# Patient Record
Sex: FEMALE | Race: BLACK OR AFRICAN AMERICAN | Employment: FULL TIME | URBAN - METROPOLITAN AREA
[De-identification: names, ages, dates, MRNs, and addresses within clinical notes are randomized per-mention and may not be internally consistent; named-entity substitution may affect disease eponyms.]

---

## 2017-02-06 ENCOUNTER — OFFICE VISIT (OUTPATIENT)
Dept: FAMILY MEDICINE CLINIC | Age: 37
End: 2017-02-06

## 2017-02-06 VITALS
TEMPERATURE: 98.3 F | OXYGEN SATURATION: 98 % | DIASTOLIC BLOOD PRESSURE: 100 MMHG | HEIGHT: 65 IN | BODY MASS INDEX: 35.65 KG/M2 | HEART RATE: 67 BPM | SYSTOLIC BLOOD PRESSURE: 142 MMHG | RESPIRATION RATE: 16 BRPM | WEIGHT: 214 LBS

## 2017-02-06 DIAGNOSIS — F32.0 MILD SINGLE CURRENT EPISODE OF MAJOR DEPRESSIVE DISORDER (HCC): ICD-10-CM

## 2017-02-06 DIAGNOSIS — I10 ESSENTIAL HYPERTENSION: Primary | ICD-10-CM

## 2017-02-06 DIAGNOSIS — Z72.0 TOBACCO USE: ICD-10-CM

## 2017-02-06 NOTE — PATIENT INSTRUCTIONS
MYFITNESS PAL (vlad on phone)       DASH Diet: Care Instructions  Your Care Instructions  The DASH diet is an eating plan that can help lower your blood pressure. DASH stands for Dietary Approaches to Stop Hypertension. Hypertension is high blood pressure. The DASH diet focuses on eating foods that are high in calcium, potassium, and magnesium. These nutrients can lower blood pressure. The foods that are highest in these nutrients are fruits, vegetables, low-fat dairy products, nuts, seeds, and legumes. But taking calcium, potassium, and magnesium supplements instead of eating foods that are high in those nutrients does not have the same effect. The DASH diet also includes whole grains, fish, and poultry. The DASH diet is one of several lifestyle changes your doctor may recommend to lower your high blood pressure. Your doctor may also want you to decrease the amount of sodium in your diet. Lowering sodium while following the DASH diet can lower blood pressure even further than just the DASH diet alone. Follow-up care is a key part of your treatment and safety. Be sure to make and go to all appointments, and call your doctor if you are having problems. It's also a good idea to know your test results and keep a list of the medicines you take. How can you care for yourself at home? Following the DASH diet  · Eat 4 to 5 servings of fruit each day. A serving is 1 medium-sized piece of fruit, ½ cup chopped or canned fruit, 1/4 cup dried fruit, or 4 ounces (½ cup) of fruit juice. Choose fruit more often than fruit juice. · Eat 4 to 5 servings of vegetables each day. A serving is 1 cup of lettuce or raw leafy vegetables, ½ cup of chopped or cooked vegetables, or 4 ounces (½ cup) of vegetable juice. Choose vegetables more often than vegetable juice. · Get 2 to 3 servings of low-fat and fat-free dairy each day. A serving is 8 ounces of milk, 1 cup of yogurt, or 1 ½ ounces of cheese.   · Eat 6 to 8 servings of grains each day. A serving is 1 slice of bread, 1 ounce of dry cereal, or ½ cup of cooked rice, pasta, or cooked cereal. Try to choose whole-grain products as much as possible. · Limit lean meat, poultry, and fish to 2 servings each day. A serving is 3 ounces, about the size of a deck of cards. · Eat 4 to 5 servings of nuts, seeds, and legumes (cooked dried beans, lentils, and split peas) each week. A serving is 1/3 cup of nuts, 2 tablespoons of seeds, or ½ cup of cooked beans or peas. · Limit fats and oils to 2 to 3 servings each day. A serving is 1 teaspoon of vegetable oil or 2 tablespoons of salad dressing. · Limit sweets and added sugars to 5 servings or less a week. A serving is 1 tablespoon jelly or jam, ½ cup sorbet, or 1 cup of lemonade. · Eat less than 2,300 milligrams (mg) of sodium a day. If you limit your sodium to 1,500 mg a day, you can lower your blood pressure even more. Tips for success  · Start small. Do not try to make dramatic changes to your diet all at once. You might feel that you are missing out on your favorite foods and then be more likely to not follow the plan. Make small changes, and stick with them. Once those changes become habit, add a few more changes. · Try some of the following:  ¨ Make it a goal to eat a fruit or vegetable at every meal and at snacks. This will make it easy to get the recommended amount of fruits and vegetables each day. ¨ Try yogurt topped with fruit and nuts for a snack or healthy dessert. ¨ Add lettuce, tomato, cucumber, and onion to sandwiches. ¨ Combine a ready-made pizza crust with low-fat mozzarella cheese and lots of vegetable toppings. Try using tomatoes, squash, spinach, broccoli, carrots, cauliflower, and onions. ¨ Have a variety of cut-up vegetables with a low-fat dip as an appetizer instead of chips and dip. ¨ Sprinkle sunflower seeds or chopped almonds over salads. Or try adding chopped walnuts or almonds to cooked vegetables.   ¨ Try some vegetarian meals using beans and peas. Add garbanzo or kidney beans to salads. Make burritos and tacos with mashed pitts beans or black beans. Where can you learn more? Go to http://sunita-kali.info/. Enter G907 in the search box to learn more about \"DASH Diet: Care Instructions. \"  Current as of: March 23, 2016  Content Version: 11.1  © 9803-4025 [email or web address], [employer]. Care instructions adapted under license by [employer] (which disclaims liability or warranty for this information). If you have questions about a medical condition or this instruction, always ask your healthcare professional. [place] [address] any warranty or liability for your use of this information.

## 2017-02-06 NOTE — PROGRESS NOTES
Chief Complaint   Patient presents with    Hypertension    Depression    Results     1. Have you been to the ER, urgent care clinic since your last visit? Hospitalized since your last visit? No    2. Have you seen or consulted any other health care providers outside of the [address] [place] [address] since your last visit? Include any pap smears or colon screening.  No

## 2017-02-06 NOTE — MR AVS SNAPSHOT
Visit Information Date & Time Provider Department Dept. Phone Encounter #  
 2/6/2017  9:30 AM Kristin Rodriguez, [address] [id number] Follow-up Instructions Return in about 4 weeks (around 3/6/2017), or CPE w/ pap. BP check. Jayant Carney Upcoming Health Maintenance Date Due  
 PAP AKA CERVICAL CYTOLOGY 10/17/2016 DTaP/Tdap/Td series (2 - Td) 9/17/2023 Allergies as of 2/6/2017  Review Complete On: 2/6/2017 By: Kristin Rodriguez MD  
  
 Severity Noted Reaction Type Reactions Pcn [Penicillins]  08/01/2011    Other (comments) Current Immunizations  Reviewed on 7/15/2016 Name Date Influenza Vaccine (Quad) PF 10/7/2015 Pneumococcal Polysaccharide (PPSV-23) 7/15/2016 Tdap 9/17/2013 Not reviewed this visit You Were Diagnosed With   
  
 Codes Comments Essential hypertension    -  Primary ICD-10-CM: I10 
ICD-9-CM: 401.9 Mild single current episode of major depressive disorder (HCC)     ICD-10-CM: F32.0 ICD-9-CM: 296.21 Tobacco use     ICD-10-CM: Z72.0 ICD-9-CM: 305.1 Vitals BP Pulse Temp Resp Height(growth percentile) Weight(growth percentile) (!) 142/100 (BP 1 Location: Left arm, BP Patient Position: Sitting) 67 98.3 °F (36.8 °C) (Oral) 16 5' 5\" (1.651 m) 214 lb (97.1 kg) SpO2 BMI OB Status Smoking Status 98% 35.61 kg/m2 Having regular periods Current Every Day Smoker BMI and BSA Data Body Mass Index Body Surface Area  
 35.61 kg/m 2 2.11 m 2 Preferred Pharmacy Pharmacy Name Phone [facility] [address] [place], [place] [address] [place] [address] [phone] Your Updated Medication List  
  
   
This list is accurate as of: 2/6/17 10:06 AM.  Always use your most recent med list. amLODIPine 5 mg tablet Commonly known as:  Dmitri Eagles Take 1 Tab by mouth daily. Blood Pressure Monitor Kit Commonly known as:  Quick Response BP Monitor Check bp once daily  
  
 polyethylene glycol 17 gram packet Commonly known as:  Ronne Zari Take 1 Packet by mouth daily. sertraline 100 mg tablet Commonly known as:  ZOLOFT  
take 1 tablet by mouth once daily Follow-up Instructions Return in about 4 weeks (around 3/6/2017), or CPE w/ pap. BP check. Tim Mancilla Patient Instructions MYFITNESS PAL (vlad on phone) DASH Diet: Care Instructions Your Care Instructions The DASH diet is an eating plan that can help lower your blood pressure. DASH stands for Dietary Approaches to Stop Hypertension. Hypertension is high blood pressure. The DASH diet focuses on eating foods that are high in calcium, potassium, and magnesium. These nutrients can lower blood pressure. The foods that are highest in these nutrients are fruits, vegetables, low-fat dairy products, nuts, seeds, and legumes. But taking calcium, potassium, and magnesium supplements instead of eating foods that are high in those nutrients does not have the same effect. The DASH diet also includes whole grains, fish, and poultry. The DASH diet is one of several lifestyle changes your doctor may recommend to lower your high blood pressure. Your doctor may also want you to decrease the amount of sodium in your diet. Lowering sodium while following the DASH diet can lower blood pressure even further than just the DASH diet alone. Follow-up care is a key part of your treatment and safety. Be sure to make and go to all appointments, and call your doctor if you are having problems. It's also a good idea to know your test results and keep a list of the medicines you take. How can you care for yourself at home? Following the DASH diet · Eat 4 to 5 servings of fruit each day. A serving is 1 medium-sized piece of fruit, ½ cup chopped or canned fruit, 1/4 cup dried fruit, or 4 ounces (½ cup) of fruit juice. Choose fruit more often than fruit juice. · Eat 4 to 5 servings of vegetables each day. A serving is 1 cup of lettuce or raw leafy vegetables, ½ cup of chopped or cooked vegetables, or 4 ounces (½ cup) of vegetable juice. Choose vegetables more often than vegetable juice. · Get 2 to 3 servings of low-fat and fat-free dairy each day. A serving is 8 ounces of milk, 1 cup of yogurt, or 1 ½ ounces of cheese. · Eat 6 to 8 servings of grains each day. A serving is 1 slice of bread, 1 ounce of dry cereal, or ½ cup of cooked rice, pasta, or cooked cereal. Try to choose whole-grain products as much as possible. · Limit lean meat, poultry, and fish to 2 servings each day. A serving is 3 ounces, about the size of a deck of cards. · Eat 4 to 5 servings of nuts, seeds, and legumes (cooked dried beans, lentils, and split peas) each week. A serving is 1/3 cup of nuts, 2 tablespoons of seeds, or ½ cup of cooked beans or peas. · Limit fats and oils to 2 to 3 servings each day. A serving is 1 teaspoon of vegetable oil or 2 tablespoons of salad dressing. · Limit sweets and added sugars to 5 servings or less a week. A serving is 1 tablespoon jelly or jam, ½ cup sorbet, or 1 cup of lemonade. · Eat less than 2,300 milligrams (mg) of sodium a day. If you limit your sodium to 1,500 mg a day, you can lower your blood pressure even more. Tips for success · Start small. Do not try to make dramatic changes to your diet all at once. You might feel that you are missing out on your favorite foods and then be more likely to not follow the plan. Make small changes, and stick with them. Once those changes become habit, add a few more changes. · Try some of the following: ¨ Make it a goal to eat a fruit or vegetable at every meal and at snacks. This will make it easy to get the recommended amount of fruits and vegetables each day. ¨ Try yogurt topped with fruit and nuts for a snack or healthy dessert. ¨ Add lettuce, tomato, cucumber, and onion to sandwiches. ¨ Combine a ready-made pizza crust with low-fat mozzarella cheese and lots of vegetable toppings. Try using tomatoes, squash, spinach, broccoli, carrots, cauliflower, and onions. ¨ Have a variety of cut-up vegetables with a low-fat dip as an appetizer instead of chips and dip. ¨ Sprinkle sunflower seeds or chopped almonds over salads. Or try adding chopped walnuts or almonds to cooked vegetables. ¨ Try some vegetarian meals using beans and peas. Add garbanzo or kidney beans to salads. Make burritos and tacos with mashed pitts beans or black beans. Where can you learn more? Go to http://sunita-kali.info/. Enter J168 in the search box to learn more about \"DASH Diet: Care Instructions. \" Current as of: March 23, 2016 Content Version: 11.1 © 5868-7301 [employer]. Care instructions adapted under license by [employer] (which disclaims liability or warranty for this information). If you have questions about a medical condition or this instruction, always ask your healthcare professional. [place] [address] any warranty or liability for your use of this information. Introducing [facility]! Dear Jackie Braxton: Thank you for requesting a Seedcamp account. Our records indicate that you have previously registered for a Seedcamp account but its currently inactive. Please call our Seedcamp support line at [phone]. Additional Information If you have questions, please visit the Frequently Asked Questions section of the Seedcamp website at [email or web address] Remember, Seedcamp is NOT to be used for urgent needs. For medical emergencies, dial 911. Now available from your iPhone and Android! Please provide this summary of care documentation to your next provider. Your primary care clinician is listed as Kristin Rodriguez. If you have any questions after today's visit, please call [phone].

## 2017-02-06 NOTE — PROGRESS NOTES
Alla Villanueva, 39 y.o.,  female    SUBJECTIVE  Ff-up    HTN- says taking norvasc with good compliance. She does consume caffeine regularly, says low salt diet. Reviewed labs. She continues to smoke. Depression- says doing well on zoloft, work as  and home life are stable. ROS:  See HPI, all others negative        Patient Active Problem List   Diagnosis Code    Tobacco use Z72.0    Tension headache G44.209    Depression F32.9    Essential hypertension I10    Chronic constipation K59.09       Current Outpatient Prescriptions   Medication Sig Dispense Refill    sertraline (ZOLOFT) 100 mg tablet take 1 tablet by mouth once daily 30 Tab 0    amLODIPine (NORVASC) 5 mg tablet Take 1 Tab by mouth daily. 90 Tab 1    Blood Pressure Monitor (QUICK RESPONSE BP MONITOR) kit Check bp once daily 1 Kit 0    polyethylene glycol (MIRALAX) 17 gram packet Take 1 Packet by mouth daily. [address] Ledesma Constable Each 0       Allergies   Allergen Reactions    Pcn [Penicillins] Other (comments)       Past Medical History   Diagnosis Date    Chronic constipation      corazon, dr. Connie Acosta    Contact dermatitis and other eczema, due to unspecified cause     Depression 12/2/2015    Essential hypertension 12/30/2015    GERD (gastroesophageal reflux disease) 9/13/2013    Gynecologic exam normal      [place]    Headache(784.0)        Social History     Social History    Marital status: SINGLE     Spouse name: N/A    Number of children: N/A    Years of education: N/A     Occupational History    Not on file.      Social History Main Topics    Smoking status: Current Every Day Smoker    Smokeless tobacco: Never Used    Alcohol use Yes      Comment: 3/month    Drug use: No    Sexual activity: Yes     Partners: Male     Birth control/ protection: None     Other Topics Concern    Not on file     Social History Narrative       Family History   Problem Relation Age of Onset    Diabetes Mother     Hypertension Mother    [facility] Kidney Disease Mother      stones         OBJECTIVE    Physical Exam:     Visit Vitals    BP (!) 142/100 (BP 1 Location: Left arm, BP Patient Position: Sitting)    Pulse 67    Temp 98.3 °F (36.8 °C) (Oral)    Resp 16    Ht 5' 5\" (1.651 m)    Wt 214 lb (97.1 kg)    SpO2 98%    BMI 35.61 kg/m2       General: alert, well-appearing,  in no apparent distress or pain  CVS: normal rate, regular rhythm, distinct S1 and S2  Lungs:clear to ausculation bilaterally, no crackles, wheezing or rhonchi noted  Abdomen: normoactive bowel sounds, soft, non-tender  Extremities: no edema, no cyanosis,  Skin: warm, no lesions, rashes noted  Psych:  mood and affect normal    Results for orders placed or performed during the hospital encounter of 11/08/16   LIPID PANEL   Result Value Ref Range    LIPID PROFILE          Cholesterol, total 121 <200 MG/DL    Triglyceride 79 <150 MG/DL    HDL Cholesterol 41 40 - 60 MG/DL    LDL, calculated 64.2 0 - 100 MG/DL    VLDL, calculated 15.8 MG/DL    CHOL/HDL Ratio 3.0 0 - 5.0     METABOLIC PANEL, COMPREHENSIVE   Result Value Ref Range    Sodium 137 136 - 145 mmol/L    Potassium 4.4 3.5 - 5.5 mmol/L    Chloride 104 100 - 108 mmol/L    CO2 29 21 - 32 mmol/L    Anion gap 4 3.0 - 18 mmol/L    Glucose 74 74 - 99 mg/dL    BUN 8 7.0 - 18 MG/DL    Creatinine 0.50 (L) 0.6 - 1.3 MG/DL    BUN/Creatinine ratio 16 12 - 20      GFR est AA >60 >60 ml/min/1.73m2    GFR est non-AA >60 >60 ml/min/1.73m2    Calcium 8.9 8.5 - 10.1 MG/DL    Bilirubin, total 0.3 0.2 - 1.0 MG/DL    ALT (SGPT) 36 13 - 56 U/L    AST (SGOT) 17 15 - 37 U/L    Alk. phosphatase 105 45 - 117 U/L    Protein, total 7.3 6.4 - 8.2 g/dL    Albumin 3.6 3.4 - 5.0 g/dL    Globulin 3.7 2.0 - 4.0 g/dL    A-G Ratio 1.0 0.8 - 1.7           ASSESSMENT/PLAN  Jose Eduardo was seen today for hypertension, depression and results.     Diagnoses and all orders for this visit:    Essential hypertension  Elevated today, DASH diet, avoid caffeine  BP log, if persistently elevated, will increase norvasc dose on next visit    Mild single current episode of major depressive disorder ([facility] [address]  Stable, cont zoloft    Tobacco use  Discussed cessation    BMI 35-   Reiterated wt loss, discussed myfitnesspal vlad, calorie counting. Follow-up Disposition:  Return in about 4 weeks (around 3/6/2017), or CPE w/ pap. BP check. .      Patient understands plan of care. Patient has provided input and agrees with goals.

## 2017-02-19 RX ORDER — AMLODIPINE BESYLATE 5 MG/1
TABLET ORAL
Qty: 90 TAB | Refills: 1 | Status: SHIPPED | OUTPATIENT
Start: 2017-02-19 | End: 2017-08-29 | Stop reason: SDUPTHER

## 2017-02-23 ENCOUNTER — TELEPHONE (OUTPATIENT)
Dept: FAMILY MEDICINE CLINIC | Age: 37
End: 2017-02-23

## 2017-02-23 NOTE — TELEPHONE ENCOUNTER
Patient called stating she is not feeling well has headache and a little nauseated. Patient states her BP has been running 144/103 and 154/94 x 2 days. Patient is taking Norvasc 5 mg daily .  Please advise

## 2017-02-23 NOTE — TELEPHONE ENCOUNTER
Left message for patient to return my call  Placed patient in 0800 appt on 02/27/2017 if she can make appt time

## 2017-02-23 NOTE — TELEPHONE ENCOUNTER
Patient identified with 2 identifiers (name and ). Patient aware to take BP daily and be seen sooner. Patient is going to keep her appt in  and monitor BP daily. ER precautions have been discussed with patient.

## 2017-03-06 ENCOUNTER — OFFICE VISIT (OUTPATIENT)
Dept: FAMILY MEDICINE CLINIC | Age: 37
End: 2017-03-06

## 2017-03-06 ENCOUNTER — HOSPITAL ENCOUNTER (OUTPATIENT)
Dept: LAB | Age: 37
Discharge: HOME | End: 2017-03-06
Payer: COMMERCIAL

## 2017-03-06 VITALS
DIASTOLIC BLOOD PRESSURE: 84 MMHG | WEIGHT: 215 LBS | TEMPERATURE: 97.9 F | SYSTOLIC BLOOD PRESSURE: 120 MMHG | BODY MASS INDEX: 35.82 KG/M2 | HEART RATE: 63 BPM | OXYGEN SATURATION: 97 % | HEIGHT: 65 IN | RESPIRATION RATE: 16 BRPM

## 2017-03-06 DIAGNOSIS — Z01.419 WELL WOMAN EXAM WITH ROUTINE GYNECOLOGICAL EXAM: Primary | ICD-10-CM

## 2017-03-06 DIAGNOSIS — Z72.0 TOBACCO USE: ICD-10-CM

## 2017-03-06 DIAGNOSIS — R35.0 URINARY FREQUENCY: ICD-10-CM

## 2017-03-06 DIAGNOSIS — I10 ESSENTIAL HYPERTENSION: ICD-10-CM

## 2017-03-06 DIAGNOSIS — F32.0 MILD SINGLE CURRENT EPISODE OF MAJOR DEPRESSIVE DISORDER (HCC): ICD-10-CM

## 2017-03-06 LAB
BILIRUB UR QL STRIP: NEGATIVE
GLUCOSE UR-MCNC: NEGATIVE MG/DL
KETONES P FAST UR STRIP-MCNC: NEGATIVE MG/DL
PH UR STRIP: 6 [PH] (ref 4.6–8)
PROT UR QL STRIP: NEGATIVE MG/DL
SP GR UR STRIP: 1.02 (ref 1–1.03)
UA UROBILINOGEN AMB POC: NORMAL (ref 0.2–1)
URINALYSIS CLARITY POC: CLEAR
URINALYSIS COLOR POC: YELLOW
URINE BLOOD POC: NEGATIVE
URINE LEUKOCYTES POC: NEGATIVE
URINE NITRITES POC: NEGATIVE

## 2017-03-06 PROCEDURE — 88175 CYTOPATH C/V AUTO FLUID REDO: CPT | Performed by: FAMILY MEDICINE

## 2017-03-06 PROCEDURE — 87624 HPV HI-RISK TYP POOLED RSLT: CPT | Performed by: FAMILY MEDICINE

## 2017-03-06 PROCEDURE — 87625 HPV TYPES 16 & 18 ONLY: CPT | Performed by: FAMILY MEDICINE

## 2017-03-06 NOTE — MR AVS SNAPSHOT
Visit Information Date & Time Provider Department Dept. Phone Encounter #  
 3/6/2017  9:00 AM Angelo Velasco, [address] [id number] Follow-up Instructions Return in about 3 months (around 6/6/2017), or if symptoms worsen or fail to improve. Upcoming Health Maintenance Date Due  
 PAP AKA CERVICAL CYTOLOGY 10/17/2016 DTaP/Tdap/Td series (2 - Td) 9/17/2023 Allergies as of 3/6/2017  Review Complete On: 3/6/2017 By: Angelo Velasco MD  
  
 Severity Noted Reaction Type Reactions Pcn [Penicillins]  08/01/2011    Other (comments) Current Immunizations  Reviewed on 7/15/2016 Name Date Influenza Vaccine (Quad) PF 10/7/2015 Pneumococcal Polysaccharide (PPSV-23) 7/15/2016 Tdap 9/17/2013 Not reviewed this visit You Were Diagnosed With   
  
 Codes Comments Well woman exam with routine gynecological exam    -  Primary ICD-10-CM: I84.008 ICD-9-CM: V72.31 Urinary frequency     ICD-10-CM: R35.0 ICD-9-CM: 788.41 Essential hypertension     ICD-10-CM: I10 
ICD-9-CM: 401.9 Mild single current episode of major depressive disorder (HCC)     ICD-10-CM: F32.0 ICD-9-CM: 296.21 Tobacco use     ICD-10-CM: Z72.0 ICD-9-CM: 305.1 Vitals BP Pulse Temp Resp Height(growth percentile) Weight(growth percentile) 120/84 (BP 1 Location: Left arm, BP Patient Position: Sitting) 63 97.9 °F (36.6 °C) (Oral) 16 5' 5\" (1.651 m) 215 lb (97.5 kg) LMP SpO2 BMI OB Status Smoking Status 02/23/2017 (Exact Date) 97% 35.78 kg/m2 Having regular periods Current Every Day Smoker Vitals History BMI and BSA Data Body Mass Index Body Surface Area 35.78 kg/m 2 2.11 m 2 Preferred Pharmacy Pharmacy Name Phone [facility] [address] [place] [address] [phone] Your Updated Medication List  
  
   
 This list is accurate as of: 3/6/17  9:41 AM.  Always use your most recent med list. amLODIPine 5 mg tablet Commonly known as:  NORVASC  
take 1 tablet by mouth once daily Blood Pressure Monitor Kit Commonly known as:  Quick Response BP Monitor Check bp once daily  
  
 polyethylene glycol 17 gram packet Commonly known as:  Zari Romero Take 1 Packet by mouth daily. sertraline 100 mg tablet Commonly known as:  ZOLOFT  
take 1 tablet by mouth once daily We Performed the Following AMB POC URINALYSIS DIP STICK AUTO W/O MICRO [43135 CPT(R)] Follow-up Instructions Return in about 3 months (around 6/6/2017), or if symptoms worsen or fail to improve. Patient Instructions Stopping Smoking: Care Instructions Your Care Instructions Cigarette smokers crave the nicotine in cigarettes. Giving it up is much harder than simply changing a habit. Your body has to stop craving the nicotine. It is hard to quit, but you can do it. There are many tools that people use to quit smoking. You may find that combining tools works best for you. There are several steps to quitting. First you get ready to quit. Then you get support to help you. After that, you learn new skills and behaviors to become a nonsmoker. For many people, a necessary step is getting and using medicine. Your doctor will help you set up the plan that best meets your needs. You may want to attend a smoking cessation program to help you quit smoking. When you choose a program, look for one that has proven success. Ask your doctor for ideas. You will greatly increase your chances of success if you take medicine as well as get counseling or join a cessation program. 
Some of the changes you feel when you first quit tobacco are uncomfortable. Your body will miss the nicotine at first, and you may feel short-tempered and grumpy. You may have trouble sleeping or concentrating. Medicine can help you deal with these symptoms. You may struggle with changing your smoking habits and rituals. The last step is the tricky one: Be prepared for the smoking urge to continue for a time. This is a lot to deal with, but keep at it. You will feel better. Follow-up care is a key part of your treatment and safety. Be sure to make and go to all appointments, and call your doctor if you are having problems. Its also a good idea to know your test results and keep a list of the medicines you take. How can you care for yourself at home? · Ask your family, friends, and coworkers for support. You have a better chance of quitting if you have help and support. · Join a support group, such as Nicotine Anonymous, for people who are trying to quit smoking. · Consider signing up for a smoking cessation program, such as the American Lung Association's Freedom from Smoking program. 
· Set a quit date. Pick your date carefully so that it is not right in the middle of a big deadline or stressful time. Once you quit, do not even take a puff. Get rid of all ashtrays and lighters after your last cigarette. Clean your house and your clothes so that they do not smell of smoke. · Learn how to be a nonsmoker. Think about ways you can avoid those things that make you reach for a cigarette. ¨ Avoid situations that put you at greatest risk for smoking. For some people, it is hard to have a drink with friends without smoking. For others, they might skip a coffee break with coworkers who smoke. ¨ Change your daily routine. Take a different route to work or eat a meal in a different place. · Cut down on stress. Calm yourself or release tension by doing an activity you enjoy, such as reading a book, taking a hot bath, or gardening. · Talk to your doctor or pharmacist about nicotine replacement therapy, which replaces the nicotine in your body.  You still get nicotine but you do not use tobacco. Nicotine replacement products help you slowly reduce the amount of nicotine you need. These products come in several forms, many of them available over-the-counter: ¨ Nicotine patches ¨ Nicotine gum and lozenges ¨ Nicotine inhaler · Ask your doctor about bupropion (Wellbutrin) or varenicline (Chantix), which are prescription medicines. They do not contain nicotine. They help you by reducing withdrawal symptoms, such as stress and anxiety. · Some people find hypnosis, acupuncture, and massage helpful for ending the smoking habit. · Eat a healthy diet and get regular exercise. Having healthy habits will help your body move past its craving for nicotine. · Be prepared to keep trying. Most people are not successful the first few times they try to quit. Do not get mad at yourself if you smoke again. Make a list of things you learned and think about when you want to try again, such as next week, next month, or next year. Where can you learn more? Go to http://sunita-kali.info/. Enter B203 in the search box to learn more about \"Stopping Smoking: Care Instructions. \" Current as of: May 26, 2016 Content Version: 11.1 © 7068-9904 [employer]. Care instructions adapted under license by [employer] (which disclaims liability or warranty for this information). If you have questions about a medical condition or this instruction, always ask your healthcare professional. [address] any warranty or liability for your use of this information. Bladder Training: Care Instructions Your Care Instructions Bladder training is used to treat urge incontinence and stress incontinence. Urge incontinence means that the need to urinate comes on so fast that you can't get to a toilet in time. Stress incontinence means that you leak urine because of pressure on your bladder.  For example, it may happen when you laugh, cough, or lift something heavy. Bladder training can increase how long you can wait before you have to urinate. It can also help your bladder hold more urine. And it can give you better control over the urge to urinate. It is important to remember that bladder training takes a few weeks to a few months to make a difference. You may not see results right away, but don't give up. Follow-up care is a key part of your treatment and safety. Be sure to make and go to all appointments, and call your doctor if you are having problems. It's also a good idea to know your test results and keep a list of the medicines you take. How can you care for yourself at home? Work with your doctor to come up with a bladder training program that is right for you. You may use one or more of the following methods. Delayed urination · In the beginning, try to keep from urinating for 5 minutes after you first feel the need to go. · While you wait, take deep, slow breaths to relax. Kegel exercises can also help you delay the need to go to the bathroom. · After some practice, when you can easily wait 5 minutes to urinate, try to wait 10 minutes before you urinate. · Slowly increase the waiting period until you are able to control when you have to urinate. Scheduled urination · Empty your bladder when you first wake up in the morning. · Schedule times throughout the day when you will urinate. · Start by going to the bathroom every hour, even if you don't need to go. · Slowly increase the time between trips to the bathroom. · When you have found a schedule that works well for you, keep doing it. · If you wake up during the night and have to urinate, do it. Apply your schedule to waking hours only. Kegel exercises These tighten and strengthen pelvic muscles, which can help you control the flow of urine. To do Kegel exercises: 
· Squeeze the same muscles you would use to stop your urine.  Your belly and thighs should not move. · Hold the squeeze for 3 seconds, and then relax for 3 seconds. · Start with 3 seconds. Then add 1 second each week until you are able to squeeze for 10 seconds. · Repeat the exercise 10 to 15 times a session. Do three or more sessions a day. When should you call for help? Watch closely for changes in your health, and be sure to contact your doctor if: 
· Your incontinence is getting worse. · You do not get better as expected. Where can you learn more? Go to http://sunita-kali.info/. Enter J077 in the search box to learn more about \"Bladder Training: Care Instructions. \" Current as of: August 12, 2016 Content Version: 11.1 © 8910-8195 [employer]. Care instructions adapted under license by ffk environment (which disclaims liability or warranty for this information). If you have questions about a medical condition or this instruction, always ask your healthcare professional. [place] [address] any warranty or liability for your use of this information. Introducing [facility]! Dear Karyn Montgomery: Thank you for requesting a [employer] account. Our records indicate that you have previously registered for a [employer] account but its currently inactive. Please call our [employer] support line at [phone]. Additional Information If you have questions, please visit the Frequently Asked Questions section of the [employer] website at [email or web address] Remember, [employer] is NOT to be used for urgent needs. For medical emergencies, dial 911. Now available from your iPhone and Android! Please provide this summary of care documentation to your next provider. Your primary care clinician is listed as Eliza Chakraborty. If you have any questions after today's visit, please call [phone].

## 2017-03-06 NOTE — PATIENT INSTRUCTIONS
Stopping Smoking: Care Instructions  Your Care Instructions  Cigarette smokers crave the nicotine in cigarettes. Giving it up is much harder than simply changing a habit. Your body has to stop craving the nicotine. It is hard to quit, but you can do it. There are many tools that people use to quit smoking. You may find that combining tools works best for you. There are several steps to quitting. First you get ready to quit. Then you get support to help you. After that, you learn new skills and behaviors to become a nonsmoker. For many people, a necessary step is getting and using medicine. Your doctor will help you set up the plan that best meets your needs. You may want to attend a smoking cessation program to help you quit smoking. When you choose a program, look for one that has proven success. Ask your doctor for ideas. You will greatly increase your chances of success if you take medicine as well as get counseling or join a cessation program.  Some of the changes you feel when you first quit tobacco are uncomfortable. Your body will miss the nicotine at first, and you may feel short-tempered and grumpy. You may have trouble sleeping or concentrating. Medicine can help you deal with these symptoms. You may struggle with changing your smoking habits and rituals. The last step is the tricky one: Be prepared for the smoking urge to continue for a time. This is a lot to deal with, but keep at it. You will feel better. Follow-up care is a key part of your treatment and safety. Be sure to make and go to all appointments, and call your doctor if you are having problems. Its also a good idea to know your test results and keep a list of the medicines you take. How can you care for yourself at home? · Ask your family, friends, and coworkers for support. You have a better chance of quitting if you have help and support.   · Join a support group, such as Nicotine Anonymous, for people who are trying to quit smoking. · Consider signing up for a smoking cessation program, such as the American Lung Association's Freedom from Smoking program.  · Set a quit date. Pick your date carefully so that it is not right in the middle of a big deadline or stressful time. Once you quit, do not even take a puff. Get rid of all ashtrays and lighters after your last cigarette. Clean your house and your clothes so that they do not smell of smoke. · Learn how to be a nonsmoker. Think about ways you can avoid those things that make you reach for a cigarette. ¨ Avoid situations that put you at greatest risk for smoking. For some people, it is hard to have a drink with friends without smoking. For others, they might skip a coffee break with coworkers who smoke. ¨ Change your daily routine. Take a different route to work or eat a meal in a different place. · Cut down on stress. Calm yourself or release tension by doing an activity you enjoy, such as reading a book, taking a hot bath, or gardening. · Talk to your doctor or pharmacist about nicotine replacement therapy, which replaces the nicotine in your body. You still get nicotine but you do not use tobacco. Nicotine replacement products help you slowly reduce the amount of nicotine you need. These products come in several forms, many of them available over-the-counter:  ¨ Nicotine patches  ¨ Nicotine gum and lozenges  ¨ Nicotine inhaler  · Ask your doctor about bupropion (Wellbutrin) or varenicline (Chantix), which are prescription medicines. They do not contain nicotine. They help you by reducing withdrawal symptoms, such as stress and anxiety. · Some people find hypnosis, acupuncture, and massage helpful for ending the smoking habit. · Eat a healthy diet and get regular exercise. Having healthy habits will help your body move past its craving for nicotine. · Be prepared to keep trying. Most people are not successful the first few times they try to quit.  Do not get mad at yourself if you smoke again. Make a list of things you learned and think about when you want to try again, such as next week, next month, or next year. Where can you learn more? Go to http://sunita-kali.info/. Enter A226 in the search box to learn more about \"Stopping Smoking: Care Instructions. \"  Current as of: May 26, 2016  Content Version: 11.1  © 4808-2038 [employer]. Care instructions adapted under license by [employer] (which disclaims liability or warranty for this information). If you have questions about a medical condition or this instruction, always ask your healthcare professional. [place] [address] any warranty or liability for your use of this information. Bladder Training: Care Instructions  Your Care Instructions  Bladder training is used to treat urge incontinence and stress incontinence. Urge incontinence means that the need to urinate comes on so fast that you can't get to a toilet in time. Stress incontinence means that you leak urine because of pressure on your bladder. For example, it may happen when you laugh, cough, or lift something heavy. Bladder training can increase how long you can wait before you have to urinate. It can also help your bladder hold more urine. And it can give you better control over the urge to urinate. It is important to remember that bladder training takes a few weeks to a few months to make a difference. You may not see results right away, but don't give up. Follow-up care is a key part of your treatment and safety. Be sure to make and go to all appointments, and call your doctor if you are having problems. It's also a good idea to know your test results and keep a list of the medicines you take. How can you care for yourself at home? Work with your doctor to come up with a bladder training program that is right for you. You may use one or more of the following methods.   Delayed urination  · In the beginning, try to keep from urinating for 5 minutes after you first feel the need to go. · While you wait, take deep, slow breaths to relax. Kegel exercises can also help you delay the need to go to the bathroom. · After some practice, when you can easily wait 5 minutes to urinate, try to wait 10 minutes before you urinate. · Slowly increase the waiting period until you are able to control when you have to urinate. Scheduled urination  · Empty your bladder when you first wake up in the morning. · Schedule times throughout the day when you will urinate. · Start by going to the bathroom every hour, even if you don't need to go. · Slowly increase the time between trips to the bathroom. · When you have found a schedule that works well for you, keep doing it. · If you wake up during the night and have to urinate, do it. Apply your schedule to waking hours only. Kegel exercises  These tighten and strengthen pelvic muscles, which can help you control the flow of urine. To do Kegel exercises:  · Squeeze the same muscles you would use to stop your urine. Your belly and thighs should not move. · Hold the squeeze for 3 seconds, and then relax for 3 seconds. · Start with 3 seconds. Then add 1 second each week until you are able to squeeze for 10 seconds. · Repeat the exercise 10 to 15 times a session. Do three or more sessions a day. When should you call for help? Watch closely for changes in your health, and be sure to contact your doctor if:  · Your incontinence is getting worse. · You do not get better as expected. Where can you learn more? Go to http://sunita-kali.info/. Enter Q241 in the search box to learn more about \"Bladder Training: Care Instructions. \"  Current as of: August 12, 2016  Content Version: 11.1  © 0602-9623 [employer]InTheBox.[email or web address]. Care instructions adapted under license by statusboom (which disclaims liability or warranty for this information).  If you have questions about a medical condition or this instruction, always ask your healthcare professional. [place] [address] any warranty or liability for your use of this information.

## 2017-03-06 NOTE — PROGRESS NOTES
Chief Complaint   Patient presents with    Well Woman     last pap 10/17/13     Patient presents for annual pap smear. Last pap 10/17/13. Abnormal Pap smears -No.  Procedures if indicated -Tubal.Form of contraception Tubal ligation. Mammogram (if indicated) -Not to date. Family history of breast CA -No, colon CA -Yes MGM, cervical CA -No.Tetanus 9/17/13.

## 2017-03-06 NOTE — PROGRESS NOTES
Subjective:   39 y.o. female for Well Woman Check. Patient's last menstrual period was 02/23/2017 (exact date). s/p BTL    HTN-taking medication without problems    Depression- says doing well,  and enjoys her job, says stressful home life with 3 kids and single mom but finds this manageable. She is on zoloft. She continues to smoke    Some urinary frequency/urgency on and off past few years. Says increased in coffee intake as well. Past Medical History:   Diagnosis Date    Chronic constipation     corazon, dr. Julio Gomez    Contact dermatitis and other eczema, due to unspecified cause     Depression 12/2/2015    Essential hypertension 12/30/2015    GERD (gastroesophageal reflux disease) 9/13/2013    Gynecologic exam normal     [place]    Headache(784.0)      Past Surgical History:   Procedure Laterality Date    HX APPENDECTOMY      HX TUBAL LIGATION       Family History   Problem Relation Age of Onset    Diabetes Mother     Hypertension Mother     Kidney Disease Mother      stones     Social History   Substance Use Topics    Smoking status: Current Every Day Smoker    Smokeless tobacco: Never Used    Alcohol use Yes      Comment: 3/month        ROS:  Feeling well. No dyspnea or chest pain on exertion. No abdominal pain, change in bowel habits, black or bloody stools. No urinary tract symptoms. GYN ROS: no breast pain or new or enlarging lumps on self exam. No neurological complaints. Objective:     Visit Vitals    /84 (BP 1 Location: Left arm, BP Patient Position: Sitting)    Pulse 63    Temp 97.9 °F (36.6 °C) (Oral)    Resp 16    Ht 5' 5\" (1.651 m)    Wt 215 lb (97.5 kg)    LMP 02/23/2017 (Exact Date)    SpO2 97%    BMI 35.78 kg/m2     The patient appears well, alert, oriented x 3, in no distress. ENT normal.  Neck supple. No adenopathy or thyromegaly. ABRAHAN. Lungs are clear, good air entry, no wheezes, rhonchi or rales.  S1 and S2 normal, no murmurs, regular rate and rhythm. Abdomen soft without tenderness, guarding, mass or organomegaly. Extremities show no edema, normal peripheral pulses. Neurological is normal, no focal findings. BREAST EXAM: breasts appear normal, no suspicious masses, no skin or nipple changes or axillary nodes    PELVIC EXAM: normal external genitalia, vulva, vagina, cervix, uterus and adnexa    Assessment/Plan:   Jason Conklin was seen today for well woman and urinary frequency. Diagnoses and all orders for this visit:    Well woman exam with routine gynecological exam  -     PAP IG, HPV AND RFX HPV 33/13,73([id number]); Future  well woman  pap smear-done today  return annually or prn  reviewed diet, exercise and weight control. Urinary frequency  -     AMB POC URINALYSIS DIP STICK AUTO W/O MICRO    Essential hypertension  Controlled, cont current medication  Rpt in 3 months-     METABOLIC PANEL, BASIC; Future    Mild single current episode of major depressive disorder (HCC)  Stable, cont zoloft    Tobacco use  Counseled on quitting    Ff-up in 3 months or sooner prn. Patient/guardian understands plan of care. Patient has provided input and agrees with goals. Future labs to be discussed on next visit.

## 2017-03-10 NOTE — PROGRESS NOTES
Pap smear showed normal cells, but positive for HPV virus. Recommend repeat pap testing next year  Also + BV, which is usually a shift in vaginal environment, will treat with topical Metrogel qhs x 5 nights. Will send erx after pt is notified of result.

## 2017-03-20 RX ORDER — METRONIDAZOLE 7.5 MG/G
1 GEL VAGINAL
Qty: 187.5 MG | Refills: 0 | Status: SHIPPED | OUTPATIENT
Start: 2017-03-20 | End: 2017-03-25

## 2017-03-20 NOTE — PROGRESS NOTES
Patient identified with 2 identifiers (name and ). Patient aware of normal pap + HPV and repeat pap in 1 year. Patient aware of + BV and to be treated with metrogel qhs x 5 nights.  Please send medication to [facility] pharmacy on file

## 2017-05-01 RX ORDER — SERTRALINE HYDROCHLORIDE 100 MG/1
TABLET, FILM COATED ORAL
Qty: 30 TAB | Refills: 2 | Status: SHIPPED | OUTPATIENT
Start: 2017-05-01 | End: 2017-08-29 | Stop reason: SDUPTHER

## 2017-08-25 RX ORDER — SERTRALINE HYDROCHLORIDE 100 MG/1
TABLET, FILM COATED ORAL
Qty: 30 TAB | Refills: 2 | Status: CANCELLED | OUTPATIENT
Start: 2017-08-25

## 2017-08-25 RX ORDER — AMLODIPINE BESYLATE 5 MG/1
TABLET ORAL
Qty: 90 TAB | Refills: 1 | Status: CANCELLED | OUTPATIENT
Start: 2017-08-25

## 2017-08-25 NOTE — TELEPHONE ENCOUNTER
This patient contacted office for the following prescriptions to be filled:    Medication requested :   Requested Prescriptions     Pending Prescriptions Disp Refills    sertraline (ZOLOFT) 100 mg tablet 30 Tab 2     Sig: take 1 tablet by mouth once daily    amLODIPine (NORVASC) 5 mg tablet 90 Tab 1      PCP: [address] [place] [address] or Print: [facility]   Mail order or Local pharmacy  [address] [place] [address]    Scheduled appointment if not seen by current providers in office: LOV 3/6/2017f/u 8/29/2017

## 2017-08-29 ENCOUNTER — OFFICE VISIT (OUTPATIENT)
Dept: FAMILY MEDICINE CLINIC | Age: 37
End: 2017-08-29

## 2017-08-29 VITALS
SYSTOLIC BLOOD PRESSURE: 110 MMHG | HEIGHT: 65 IN | HEART RATE: 67 BPM | DIASTOLIC BLOOD PRESSURE: 70 MMHG | RESPIRATION RATE: 16 BRPM | BODY MASS INDEX: 36.75 KG/M2 | TEMPERATURE: 98.1 F | WEIGHT: 220.6 LBS | OXYGEN SATURATION: 97 %

## 2017-08-29 DIAGNOSIS — I10 ESSENTIAL HYPERTENSION: Primary | ICD-10-CM

## 2017-08-29 DIAGNOSIS — G44.209 TENSION-TYPE HEADACHE, NOT INTRACTABLE, UNSPECIFIED CHRONICITY PATTERN: ICD-10-CM

## 2017-08-29 DIAGNOSIS — F17.200 SMOKING: ICD-10-CM

## 2017-08-29 DIAGNOSIS — F32.89 OTHER DEPRESSION: ICD-10-CM

## 2017-08-29 DIAGNOSIS — K59.09 OTHER CONSTIPATION: ICD-10-CM

## 2017-08-29 RX ORDER — AMLODIPINE BESYLATE 5 MG/1
5 TABLET ORAL DAILY
Qty: 90 TAB | Refills: 0 | Status: SHIPPED | OUTPATIENT
Start: 2017-08-29 | End: 2017-11-30 | Stop reason: SDUPTHER

## 2017-08-29 RX ORDER — SERTRALINE HYDROCHLORIDE 100 MG/1
TABLET, FILM COATED ORAL
Qty: 30 TAB | Refills: 2 | Status: SHIPPED | OUTPATIENT
Start: 2017-08-29 | End: 2017-11-30 | Stop reason: SDUPTHER

## 2017-08-29 RX ORDER — POLYETHYLENE GLYCOL 3350 17 G/17G
17 POWDER, FOR SOLUTION ORAL DAILY
Qty: 30 EACH | Refills: 0 | Status: SHIPPED | OUTPATIENT
Start: 2017-08-29 | End: 2017-11-30 | Stop reason: SDUPTHER

## 2017-08-29 RX ORDER — HYDROCODONE BITARTRATE AND ACETAMINOPHEN 5; 325 MG/1; MG/1
TABLET ORAL
COMMUNITY
Start: 2016-03-08 | End: 2017-11-30

## 2017-08-29 NOTE — PROGRESS NOTES
HISTORY OF PRESENT ILLNESS  Jose Eduardo Goss is a 44 y.o. female. HPI: Dr. Rodolfo Uribe patient. Here for routine follow up and medication refill. Has h/o hypertension. On medication. Stable vitals today. Asymptomatic. Shows compliance with taking medication and no side effects. Also h/o depression. Stable on medication. Shows compliance with medication. No side effects. Does not follow any specialist. No specific concern. No mood changes. Sleep and appetite fair. No suicidal ideation. Does have on and off constipation. Taking high fiber diet and miralax as needed. Said much better than before. No abdominal pain. No nausea or vomiting. Said occasionally headache. One episode few days back. No concern at this time. Discussed to take tylenol or advil with food as soon as feels headache is coming. No ext weakness. No vision changes. No nausea or vomiting .said she has ate some barbeque and that brought her headache and felt better in one or two days. Does smoke. Trying to cut back but still smoking. Visit Vitals    /70 (BP 1 Location: Left arm, BP Patient Position: Sitting)    Pulse 67    Temp 98.1 °F (36.7 °C) (Oral)    Resp 16    Ht 5' 5\" (1.651 m)    Wt 220 lb 9.6 oz (100.1 kg)    SpO2 97%    BMI 36.71 kg/m2     Review medication list, vitals, problem list,allergies. ROS: see HPI     Physical Exam   Constitutional: She is oriented to person, place, and time. No distress. Neck: No thyromegaly present. Cardiovascular: Normal rate, regular rhythm and normal heart sounds. Pulmonary/Chest:   CTA   Abdominal: Soft. Bowel sounds are normal. There is no tenderness. Musculoskeletal: She exhibits no edema. Neurological: She is oriented to person, place, and time. Psychiatric: Her behavior is normal.       ASSESSMENT and PLAN    ICD-10-CM ICD-9-CM    1. Essential hypertension: stable at this time. Low salt diet. Exercise as tolerated. Will continue current plan.     I10 401.9 amLODIPine (NORVASC) 5 mg tablet   2. Other depression: stable. Given medication refill. F32.89 311 sertraline (ZOLOFT) 100 mg tablet   3. Other constipation: symptomatic treatment. High fiber diet. Drink more fluid. F/u next visit. K59.09 564.09 polyethylene glycol (MIRALAX) 17 gram packet   4. Smoking: discussed again smoking cessation. She is aware and said she will work on it slowly. F/u with PCP  F17.200 305.1    5. Tension-type headache, not intractable, unspecified chronicity pattern: for now on and off. Discuss to take tylenol or motrin / Gokul Mathew as soon as feeling it is coming. Eat on time. Drink more fluid. G44.209 339.10    Pt understood and agrees with above plan. Follow-up Disposition:  Return in about 3 months (around 11/29/2017).

## 2017-08-29 NOTE — PROGRESS NOTES
1. Have you been to the ER, urgent care clinic since your last visit? Hospitalized since your last visit? [facility] 4/05/17    2. Have you seen or consulted any other health care providers outside of the [address] [place] [address] since your last visit? Include any pap smears or colon screening. No    Last flu vaccine 8/24/17 through employer.

## 2017-08-29 NOTE — MR AVS SNAPSHOT
Visit Information Date & Time Provider Department Dept. Phone Encounter #  
 8/29/2017  3:00 PM Frank Garcia, [address] [id number] Follow-up Instructions Return in about 3 months (around 11/29/2017). Upcoming Health Maintenance Date Due  
 PAP AKA CERVICAL CYTOLOGY 3/6/2020 DTaP/Tdap/Td series (2 - Td) 9/17/2023 Allergies as of 8/29/2017  Review Complete On: 8/29/2017 By: Frank Garcia MD  
  
 Severity Noted Reaction Type Reactions Carrot  08/29/2017    Swelling Mouth and gum swelling Pcn [Penicillins]  08/01/2011    Other (comments) [place]  08/29/2017    Hives, Swelling Current Immunizations  Reviewed on 7/15/2016 Name Date Influenza Vaccine 8/24/2017 Influenza Vaccine (Quad) PF 10/7/2015 Pneumococcal Polysaccharide (PPSV-23) 7/15/2016 Tdap 9/17/2013 Not reviewed this visit You Were Diagnosed With   
  
 Codes Comments Essential hypertension    -  Primary ICD-10-CM: I10 
ICD-9-CM: 401.9 Other depression     ICD-10-CM: F32.89 ICD-9-CM: 245 Other constipation     ICD-10-CM: K59.09 
ICD-9-CM: 564.09 Smoking     ICD-10-CM: F17.200 ICD-9-CM: 305.1 Tension-type headache, not intractable, unspecified chronicity pattern     ICD-10-CM: G44.209 ICD-9-CM: 339.10 Vitals BP Pulse Temp Resp Height(growth percentile) Weight(growth percentile) 110/70 (BP 1 Location: Left arm, BP Patient Position: Sitting) 67 98.1 °F (36.7 °C) (Oral) 16 5' 5\" (1.651 m) 220 lb 9.6 oz (100.1 kg) LMP SpO2 BMI OB Status Smoking Status 08/19/2017 97% 36.71 kg/m2 Having regular periods Current Every Day Smoker Vitals History BMI and BSA Data Body Mass Index Body Surface Area  
 36.71 kg/m 2 2.14 m 2 Preferred Pharmacy Pharmacy Name Phone [id number], [address] [facility] [place] [phone] Your Updated Medication List  
  
   
 This list is accurate as of: 8/29/17  3:56 PM.  Always use your most recent med list. amLODIPine 5 mg tablet Commonly known as:  Prashant Grady Take 1 Tab by mouth daily. Blood Pressure Monitor Kit Commonly known as:  Quick Response BP Monitor Check bp once daily HYDROcodone-acetaminophen 5-325 mg per tablet Commonly known as:  Fredrica Carney Take 1 Tab by Mouth Every 4 Hours As Needed for Pain.  
  
 polyethylene glycol 17 gram packet Commonly known as:  Giovanny Holster Take 1 Packet by mouth daily. sertraline 100 mg tablet Commonly known as:  ZOLOFT  
take 1 tablet by mouth once daily Prescriptions Sent to Pharmacy Refills  
 polyethylene glycol (MIRALAX) 17 gram packet 0 Sig: Take 1 Packet by mouth daily. Class: Normal  
 Pharmacy: [facility]/pharmacy [address] [place] [address],[address] [facility] [address] [place] [address] Ph #: [phone] Route: Oral  
 sertraline (ZOLOFT) 100 mg tablet 2 Sig: take 1 tablet by mouth once daily Class: Normal  
 Pharmacy: [facility]/pharmacy #[id number] [facility] [address] Ph #: [phone]  
 amLODIPine (NORVASC) 5 mg tablet 0 Sig: Take 1 Tab by mouth daily. Class: Normal  
 Pharmacy: [facility]/pharmacy [address] [place] [address],[address] [facility] [place] [address] Ph #: [phone] Route: Oral  
  
Follow-up Instructions Return in about 3 months (around 11/29/2017). Patient Instructions Depression and Chronic Disease: Care Instructions Your Care Instructions A chronic disease is one that you have for a long time. Some chronic diseases can be controlled, but they usually cannot be cured. Depression is common in people with chronic diseases, but it often goes unnoticed. Many people have concerns about seeking treatment for a mental health problem. You may think it's a sign of weakness, or you don't want people to know about it.  It's important to overcome these reasons for not seeking treatment. Treating depression or anxiety is good for your health. Follow-up care is a key part of your treatment and safety. Be sure to make and go to all appointments, and call your doctor if you are having problems. It's also a good idea to know your test results and keep a list of the medicines you take. How can you care for yourself at home? Watch for symptoms of depression The symptoms of depression are often subtle at first. You may think they are caused by your disease rather than depression. Or you may think it is normal to be depressed when you have a chronic disease. If you are depressed you may: · Feel sad or hopeless. · Feel guilty or worthless. · Not enjoy the things you used to enjoy. · Feel hopeless, as though life is not worth living. · Have trouble thinking or remembering. · Have low energy, and you may not eat or sleep well. · Pull away from others. · Think often about death or killing yourself. (Keep the numbers for these national suicide hotlines: 3-907-489-TALK [1-971.949.9914] and 1-141-JIHMLMQ [1-982.475.3701]. ) Get treatment By treating your depression, you can feel more hopeful and have more energy. If you feel better, you may take better care of yourself, so your health may improve. · Talk to your doctor if you have any changes in mood during treatment for your disease. · Ask your doctor for help. Counseling, antidepressant medicine, or a combination of the two can help most people with depression. Often a combination works best. Counseling can also help you cope with having a chronic disease. When should you call for help? Call 911 anytime you think you may need emergency care. For example, call if: 
· You feel like hurting yourself or someone else. · Someone you know has depression and is about to attempt or is attempting suicide. Call your doctor now or seek immediate medical care if: 
· You hear voices. · Someone you know has depression and: ¨ Starts to give away his or her possessions. ¨ Uses illegal drugs or drinks alcohol heavily. ¨ Talks or writes about death, including writing suicide notes or talking about guns, knives, or pills. ¨ Starts to spend a lot of time alone. ¨ Acts very aggressively or suddenly appears calm. Watch closely for changes in your health, and be sure to contact your doctor if: 
· You do not get better as expected. Where can you learn more? Go to http://sunita-kali.info/. Enter A619 in the search box to learn more about \"Depression and Chronic Disease: Care Instructions. \" Current as of: July 26, 2016 Content Version: 11.3 © 8623-2855 [employer]. Care instructions adapted under license by [employer] (which disclaims liability or warranty for this information). If you have questions about a medical condition or this instruction, always ask your healthcare professional. [place] [address] any warranty or liability for your use of this information. Constipation: Care Instructions Your Care Instructions Constipation means that you have a hard time passing stools (bowel movements). People pass stools from 3 times a day to once every 3 days. What is normal for you may be different. Constipation may occur with pain in the rectum and cramping. The pain may get worse when you try to pass stools. Sometimes there are small amounts of bright red blood on toilet paper or the surface of stools. This is because of enlarged veins near the rectum (hemorrhoids). A few changes in your diet and lifestyle may help you avoid ongoing constipation. Your doctor may also prescribe medicine to help loosen your stool. Some medicines can cause constipation. These include pain medicines and antidepressants. Tell your doctor about all the medicines you take. Your doctor may want to make a medicine change to ease your symptoms. Follow-up care is a key part of your treatment and safety. Be sure to make and go to all appointments, and call your doctor if you are having problems. It's also a good idea to know your test results and keep a list of the medicines you take. How can you care for yourself at home? · Drink plenty of fluids, enough so that your urine is light yellow or clear like water. If you have kidney, heart, or liver disease and have to limit fluids, talk with your doctor before you increase the amount of fluids you drink. · Include high-fiber foods in your diet each day. These include fruits, vegetables, beans, and whole grains. · Get at least 30 minutes of exercise on most days of the week. Walking is a good choice. You also may want to do other activities, such as running, swimming, cycling, or playing tennis or team sports. · Take a fiber supplement, such as Citrucel or Metamucil, every day. Read and follow all instructions on the label. · Schedule time each day for a bowel movement. A daily routine may help. Take your time having your bowel movement. · Support your feet with a small step stool when you sit on the toilet. This helps flex your hips and places your pelvis in a squatting position. · Your doctor may recommend an over-the-counter laxative to relieve your constipation. Examples are Milk of Magnesia and MiraLax. Read and follow all instructions on the label. Do not use laxatives on a long-term basis. When should you call for help? Call your doctor now or seek immediate medical care if: 
· You have new or worse belly pain. · You have new or worse nausea or vomiting. · You have blood in your stools. Watch closely for changes in your health, and be sure to contact your doctor if: 
· Your constipation is getting worse. · You do not get better as expected. Where can you learn more? Go to http://sunita-kali.info/.  
Enter [phone] in the search box to learn more about \"Constipation: Care Instructions. \" Current as of: March 20, 2017 Content Version: 11.3 © 0074-6060 [employer]. Care instructions adapted under license by [employer] (which disclaims liability or warranty for this information). If you have questions about a medical condition or this instruction, always ask your healthcare professional. [address] any warranty or liability for your use of this information. High-Fiber Diet: Care Instructions Your Care Instructions A high-fiber diet may help you relieve constipation and feel less bloated. Your doctor and dietitian will help you make a high-fiber eating plan based on your personal needs. The plan will include the things you like to eat. It will also make sure that you get 30 grams of fiber a day. Before you make changes to the way you eat, be sure to talk with your doctor or dietitian. Follow-up care is a key part of your treatment and safety. Be sure to make and go to all appointments, and call your doctor if you are having problems. It's also a good idea to know your test results and keep a list of the medicines you take. How can you care for yourself at home? · You can increase how much fiber you get if you eat more of certain foods. These foods include: ¨ Whole-grain breads and cereals. ¨ Fruits, such as pears, apples, and peaches. Eat the skins and peels if you can. ¨ Vegetables, such as broccoli, cabbage, spinach, carrots, asparagus, and squash. ¨ Starchy vegetables. These include potatoes with skins, kidney beans, and lima beans. · Take a fiber supplement every day if your doctor recommends it. Examples are Benefiber, Citrucel, FiberCon, and Metamucil. Ask your doctor how much to take. · Drink plenty of fluids, enough so that your urine is light yellow or clear like water.  If you have kidney, heart, or liver disease and have to limit fluids, talk with your doctor before you increase the amount of fluids you drink. · Get some exercise every day. Exercise helps stool move through the colon. It also helps prevent constipation. · Keep a food diary. Try to notice and write down what foods cause gas, pain, or other symptoms. Then you can avoid these foods. Where can you learn more? Go to http://sunita-kali.info/. Enter S043 in the search box to learn more about \"High-Fiber Diet: Care Instructions. \" Current as of: December 15, 2016 Content Version: 11.3 © 8616-3898 [employer]. Care instructions adapted under license by [employer] (which disclaims liability or warranty for this information). If you have questions about a medical condition or this instruction, always ask your healthcare professional. [address] any warranty or liability for your use of this information. Low Sodium Diet (2,000 Milligram): Care Instructions Your Care Instructions Too much sodium causes your body to hold on to extra water. This can raise your blood pressure and force your heart and kidneys to work harder. In very serious cases, this could cause you to be put in the hospital. It might even be life-threatening. By limiting sodium, you will feel better and lower your risk of serious problems. The most common source of sodium is salt. People get most of the salt in their diet from canned, prepared, and packaged foods. Fast food and restaurant meals also are very high in sodium. Your doctor will probably limit your sodium to less than 2,000 milligrams (mg) a day. This limit counts all the sodium in prepared and packaged foods and any salt you add to your food. Follow-up care is a key part of your treatment and safety. Be sure to make and go to all appointments, and call your doctor if you are having problems. It's also a good idea to know your test results and keep a list of the medicines you take. How can you care for yourself at home? Read food labels · Read labels on cans and food packages. The labels tell you how much sodium is in each serving. Make sure that you look at the serving size. If you eat more than the serving size, you have eaten more sodium. · Food labels also tell you the Percent Daily Value for sodium. Choose products with low Percent Daily Values for sodium. · Be aware that sodium can come in forms other than salt, including monosodium glutamate (MSG), sodium citrate, and sodium bicarbonate (baking soda). MSG is often added to Asian food. When you eat out, you can sometimes ask for food without MSG or added salt. Buy low-sodium foods · Buy foods that are labeled \"unsalted\" (no salt added), \"sodium-free\" (less than 5 mg of sodium per serving), or \"low-sodium\" (less than 140 mg of sodium per serving). Foods labeled \"reduced-sodium\" and \"light sodium\" may still have too much sodium. Be sure to read the label to see how much sodium you are getting. · Buy fresh vegetables, or frozen vegetables without added sauces. Buy low-sodium versions of canned vegetables, soups, and other canned goods. Prepare low-sodium meals · Cut back on the amount of salt you use in cooking. This will help you adjust to the taste. Do not add salt after cooking. One teaspoon of salt has about 2,300 mg of sodium. · Take the salt shaker off the table. · Flavor your food with garlic, lemon juice, onion, vinegar, herbs, and spices. Do not use soy sauce, lite soy sauce, steak sauce, onion salt, garlic salt, celery salt, mustard, or ketchup on your food. · Use low-sodium salad dressings, sauces, and ketchup. Or make your own salad dressings and sauces without adding salt. · Use less salt (or none) when recipes call for it. You can often use half the salt a recipe calls for without losing flavor. Other foods such as rice, pasta, and grains do not need added salt. · Rinse canned vegetables, and cook them in fresh water.  This removes somebut not allof the salt. · Avoid water that is naturally high in sodium or that has been treated with water softeners, which add sodium. Call your local water company to find out the sodium content of your water supply. If you buy bottled water, read the label and choose a sodium-free brand. Avoid high-sodium foods · Avoid eating: ¨ Smoked, cured, salted, and canned meat, fish, and poultry. ¨ Ham, wilson, hot dogs, and luncheon meats. ¨ Regular, hard, and processed cheese and regular peanut butter. ¨ Crackers with salted tops, and other salted snack foods such as pretzels, chips, and salted popcorn. ¨ Frozen prepared meals, unless labeled low-sodium. ¨ Canned and dried soups, broths, and bouillon, unless labeled sodium-free or low-sodium. ¨ Canned vegetables, unless labeled sodium-free or low-sodium. ¨ Cordelia Hinton fries, pizza, tacos, and other fast foods. ¨ Pickles, olives, ketchup, and other condiments, especially soy sauce, unless labeled sodium-free or low-sodium. Where can you learn more? Go to http://sunita-kali.info/. Enter E322 in the search box to learn more about \"Low Sodium Diet (2,000 Milligram): Care Instructions. \" Current as of: July 26, 2016 Content Version: 11.3 © 4924-5340 [employer]. Care instructions adapted under license by [employer] (which disclaims liability or warranty for this information). If you have questions about a medical condition or this instruction, always ask your healthcare professional. [place] [address] any warranty or liability for your use of this information. Introducing [facility]! Willadean Saint introduces [facility] patient portal. Now you can access parts of your medical record, email your doctor's office, and request medication refills online. 1. In your internet browser, go to [email or web address] 2. Click on the First Time User? Click Here link in the Sign In box. You will see the New Member Sign Up page. 3. Enter your [employer] Access Code exactly as it appears below. You will not need to use this code after youve completed the sign-up process. If you do not sign up before the expiration date, you must request a new code. · [employer] Access Code: [email or web address] Expires: 11/27/2017  3:19 PM 
 
4. Enter the last four digits of your Social Security Number (xxxx) and Date of Birth (mm/dd/yyyy) as indicated and click Submit. You will be taken to the next sign-up page. 5. Create a [employer] ID. This will be your [employer] login ID and cannot be changed, so think of one that is secure and easy to remember. 6. Create a [employer] password. You can change your password at any time. 7. Enter your Password Reset Question and Answer. This can be used at a later time if you forget your password. 8. Enter your e-mail address. You will receive e-mail notification when new information is available in [address] 9. Click Sign Up. You can now view and download portions of your medical record. 10. Click the Download Summary menu link to download a portable copy of your medical information. If you have questions, please visit the Frequently Asked Questions section of the [employer] website. Remember, [employer] is NOT to be used for urgent needs. For medical emergencies, dial 911. Now available from your iPhone and Android! Please provide this summary of care documentation to your next provider. Your primary care clinician is listed as Esmeralda Ahumada. If you have any questions after today's visit, please call [phone].

## 2017-08-29 NOTE — PATIENT INSTRUCTIONS
Depression and Chronic Disease: Care Instructions  Your Care Instructions  A chronic disease is one that you have for a long time. Some chronic diseases can be controlled, but they usually cannot be cured. Depression is common in people with chronic diseases, but it often goes unnoticed. Many people have concerns about seeking treatment for a mental health problem. You may think it's a sign of weakness, or you don't want people to know about it. It's important to overcome these reasons for not seeking treatment. Treating depression or anxiety is good for your health. Follow-up care is a key part of your treatment and safety. Be sure to make and go to all appointments, and call your doctor if you are having problems. It's also a good idea to know your test results and keep a list of the medicines you take. How can you care for yourself at home? Watch for symptoms of depression  The symptoms of depression are often subtle at first. You may think they are caused by your disease rather than depression. Or you may think it is normal to be depressed when you have a chronic disease. If you are depressed you may:  · Feel sad or hopeless. · Feel guilty or worthless. · Not enjoy the things you used to enjoy. · Feel hopeless, as though life is not worth living. · Have trouble thinking or remembering. · Have low energy, and you may not eat or sleep well. · Pull away from others. · Think often about death or killing yourself. (Keep the numbers for these national suicide hotlines: 4-749-105-TALK [1-282.843.4497] and 2-311-RTVBLSN [1-198.398.9391]. )  Get treatment  By treating your depression, you can feel more hopeful and have more energy. If you feel better, you may take better care of yourself, so your health may improve. · Talk to your doctor if you have any changes in mood during treatment for your disease. · Ask your doctor for help.  Counseling, antidepressant medicine, or a combination of the two can help most people with depression. Often a combination works best. Counseling can also help you cope with having a chronic disease. When should you call for help? Call 911 anytime you think you may need emergency care. For example, call if:  · You feel like hurting yourself or someone else. · Someone you know has depression and is about to attempt or is attempting suicide. Call your doctor now or seek immediate medical care if:  · You hear voices. · Someone you know has depression and:  ¨ Starts to give away his or her possessions. ¨ Uses illegal drugs or drinks alcohol heavily. ¨ Talks or writes about death, including writing suicide notes or talking about guns, knives, or pills. ¨ Starts to spend a lot of time alone. ¨ Acts very aggressively or suddenly appears calm. Watch closely for changes in your health, and be sure to contact your doctor if:  · You do not get better as expected. Where can you learn more? Go to http://sunitathreadsykali.info/. Enter R842 in the search box to learn more about \"Depression and Chronic Disease: Care Instructions. \"  Current as of: July 26, 2016  Content Version: 11.3  © 0481-3098 [employer]. Care instructions adapted under license by [employer] (which disclaims liability or warranty for this information). If you have questions about a medical condition or this instruction, always ask your healthcare professional. [address] any warranty or liability for your use of this information. Constipation: Care Instructions  Your Care Instructions  Constipation means that you have a hard time passing stools (bowel movements). People pass stools from 3 times a day to once every 3 days. What is normal for you may be different. Constipation may occur with pain in the rectum and cramping. The pain may get worse when you try to pass stools.  Sometimes there are small amounts of bright red blood on toilet paper or the surface of stools. This is because of enlarged veins near the rectum (hemorrhoids). A few changes in your diet and lifestyle may help you avoid ongoing constipation. Your doctor may also prescribe medicine to help loosen your stool. Some medicines can cause constipation. These include pain medicines and antidepressants. Tell your doctor about all the medicines you take. Your doctor may want to make a medicine change to ease your symptoms. Follow-up care is a key part of your treatment and safety. Be sure to make and go to all appointments, and call your doctor if you are having problems. It's also a good idea to know your test results and keep a list of the medicines you take. How can you care for yourself at home? · Drink plenty of fluids, enough so that your urine is light yellow or clear like water. If you have kidney, heart, or liver disease and have to limit fluids, talk with your doctor before you increase the amount of fluids you drink. · Include high-fiber foods in your diet each day. These include fruits, vegetables, beans, and whole grains. · Get at least 30 minutes of exercise on most days of the week. Walking is a good choice. You also may want to do other activities, such as running, swimming, cycling, or playing tennis or team sports. · Take a fiber supplement, such as Citrucel or Metamucil, every day. Read and follow all instructions on the label. · Schedule time each day for a bowel movement. A daily routine may help. Take your time having your bowel movement. · Support your feet with a small step stool when you sit on the toilet. This helps flex your hips and places your pelvis in a squatting position. · Your doctor may recommend an over-the-counter laxative to relieve your constipation. Examples are Milk of Magnesia and MiraLax. Read and follow all instructions on the label. Do not use laxatives on a long-term basis. When should you call for help?   Call your doctor now or seek immediate medical care if:  · You have new or worse belly pain. · You have new or worse nausea or vomiting. · You have blood in your stools. Watch closely for changes in your health, and be sure to contact your doctor if:  · Your constipation is getting worse. · You do not get better as expected. Where can you learn more? Go to http://sunita-kali.info/. Enter 21 [phone] in the search box to learn more about \"Constipation: Care Instructions. \"  Current as of: March 20, 2017  Content Version: 11.3  © 3788-8827 [employer]). Care instructions adapted under license by [employer]oot (which disclaims liability or warranty for this information). If you have questions about a medical condition or this instruction, always ask your healthcare professional. [address] any warranty or liability for your use of this information. High-Fiber Diet: Care Instructions  Your Care Instructions  A high-fiber diet may help you relieve constipation and feel less bloated. Your doctor and dietitian will help you make a high-fiber eating plan based on your personal needs. The plan will include the things you like to eat. It will also make sure that you get 30 grams of fiber a day. Before you make changes to the way you eat, be sure to talk with your doctor or dietitian. Follow-up care is a key part of your treatment and safety. Be sure to make and go to all appointments, and call your doctor if you are having problems. It's also a good idea to know your test results and keep a list of the medicines you take. How can you care for yourself at home? · You can increase how much fiber you get if you eat more of certain foods. These foods include:  ¨ Whole-grain breads and cereals. ¨ Fruits, such as pears, apples, and peaches. Eat the skins and peels if you can. ¨ Vegetables, such as broccoli, cabbage, spinach, carrots, asparagus, and squash. ¨ Starchy vegetables.  These include potatoes with skins, kidney beans, and lima beans. · Take a fiber supplement every day if your doctor recommends it. Examples are Benefiber, Citrucel, FiberCon, and Metamucil. Ask your doctor how much to take. · Drink plenty of fluids, enough so that your urine is light yellow or clear like water. If you have kidney, heart, or liver disease and have to limit fluids, talk with your doctor before you increase the amount of fluids you drink. · Get some exercise every day. Exercise helps stool move through the colon. It also helps prevent constipation. · Keep a food diary. Try to notice and write down what foods cause gas, pain, or other symptoms. Then you can avoid these foods. Where can you learn more? Go to http://sunita-kali.info/. Enter M366 in the search box to learn more about \"High-Fiber Diet: Care Instructions. \"  Current as of: December 15, 2016  Content Version: 11.3  © 0134-5766 [employer]. Care instructions adapted under license by [employer] (which disclaims liability or warranty for this information). If you have questions about a medical condition or this instruction, always ask your healthcare professional. [place] [address] any warranty or liability for your use of this information. Low Sodium Diet (2,000 Milligram): Care Instructions  Your Care Instructions  Too much sodium causes your body to hold on to extra water. This can raise your blood pressure and force your heart and kidneys to work harder. In very serious cases, this could cause you to be put in the hospital. It might even be life-threatening. By limiting sodium, you will feel better and lower your risk of serious problems. The most common source of sodium is salt. People get most of the salt in their diet from canned, prepared, and packaged foods. Fast food and restaurant meals also are very high in sodium.  Your doctor will probably limit your sodium to less than 2,000 milligrams (mg) a day. This limit counts all the sodium in prepared and packaged foods and any salt you add to your food. Follow-up care is a key part of your treatment and safety. Be sure to make and go to all appointments, and call your doctor if you are having problems. It's also a good idea to know your test results and keep a list of the medicines you take. How can you care for yourself at home? Read food labels  · Read labels on cans and food packages. The labels tell you how much sodium is in each serving. Make sure that you look at the serving size. If you eat more than the serving size, you have eaten more sodium. · Food labels also tell you the Percent Daily Value for sodium. Choose products with low Percent Daily Values for sodium. · Be aware that sodium can come in forms other than salt, including monosodium glutamate (MSG), sodium citrate, and sodium bicarbonate (baking soda). MSG is often added to Asian food. When you eat out, you can sometimes ask for food without MSG or added salt. Buy low-sodium foods  · Buy foods that are labeled \"unsalted\" (no salt added), \"sodium-free\" (less than 5 mg of sodium per serving), or \"low-sodium\" (less than 140 mg of sodium per serving). Foods labeled \"reduced-sodium\" and \"light sodium\" may still have too much sodium. Be sure to read the label to see how much sodium you are getting. · Buy fresh vegetables, or frozen vegetables without added sauces. Buy low-sodium versions of canned vegetables, soups, and other canned goods. Prepare low-sodium meals  · Cut back on the amount of salt you use in cooking. This will help you adjust to the taste. Do not add salt after cooking. One teaspoon of salt has about 2,300 mg of sodium. · Take the salt shaker off the table. · Flavor your food with garlic, lemon juice, onion, vinegar, herbs, and spices.  Do not use soy sauce, lite soy sauce, steak sauce, onion salt, garlic salt, celery salt, mustard, or ketchup on your food.  · Use low-sodium salad dressings, sauces, and ketchup. Or make your own salad dressings and sauces without adding salt. · Use less salt (or none) when recipes call for it. You can often use half the salt a recipe calls for without losing flavor. Other foods such as rice, pasta, and grains do not need added salt. · Rinse canned vegetables, and cook them in fresh water. This removes some--but not all--of the salt. · Avoid water that is naturally high in sodium or that has been treated with water softeners, which add sodium. Call your local water company to find out the sodium content of your water supply. If you buy bottled water, read the label and choose a sodium-free brand. Avoid high-sodium foods  · Avoid eating:  ¨ Smoked, cured, salted, and canned meat, fish, and poultry. ¨ Ham, wilson, hot dogs, and luncheon meats. ¨ Regular, hard, and processed cheese and regular peanut butter. ¨ Crackers with salted tops, and other salted snack foods such as pretzels, chips, and salted popcorn. ¨ Frozen prepared meals, unless labeled low-sodium. ¨ Canned and dried soups, broths, and bouillon, unless labeled sodium-free or low-sodium. ¨ Canned vegetables, unless labeled sodium-free or low-sodium. ¨ Western Isis fries, pizza, tacos, and other fast foods. ¨ Pickles, olives, ketchup, and other condiments, especially soy sauce, unless labeled sodium-free or low-sodium. Where can you learn more? Go to http://sunita-kali.info/. Enter E832 in the search box to learn more about \"Low Sodium Diet (2,000 Milligram): Care Instructions. \"  Current as of: July 26, 2016  Content Version: 11.3  © 9247-8475 [employer]. Care instructions adapted under license by [employer] (which disclaims liability or warranty for this information).  If you have questions about a medical condition or this instruction, always ask your healthcare professional. [address] any warranty or liability for your use of this information.

## 2017-11-30 ENCOUNTER — OFFICE VISIT (OUTPATIENT)
Dept: FAMILY MEDICINE CLINIC | Age: 37
End: 2017-11-30

## 2017-11-30 VITALS
OXYGEN SATURATION: 96 % | DIASTOLIC BLOOD PRESSURE: 100 MMHG | WEIGHT: 224 LBS | HEIGHT: 65 IN | SYSTOLIC BLOOD PRESSURE: 142 MMHG | RESPIRATION RATE: 16 BRPM | BODY MASS INDEX: 37.32 KG/M2 | TEMPERATURE: 97.8 F | HEART RATE: 72 BPM

## 2017-11-30 DIAGNOSIS — L02.93 CARBUNCLE: ICD-10-CM

## 2017-11-30 DIAGNOSIS — K59.09 OTHER CONSTIPATION: ICD-10-CM

## 2017-11-30 DIAGNOSIS — I10 ESSENTIAL HYPERTENSION: Primary | ICD-10-CM

## 2017-11-30 DIAGNOSIS — Z72.0 TOBACCO USE: ICD-10-CM

## 2017-11-30 DIAGNOSIS — Z13.1 SCREENING FOR DIABETES MELLITUS (DM): ICD-10-CM

## 2017-11-30 DIAGNOSIS — F32.89 OTHER DEPRESSION: ICD-10-CM

## 2017-11-30 RX ORDER — AMLODIPINE BESYLATE 5 MG/1
5 TABLET ORAL DAILY
Qty: 90 TAB | Refills: 0 | Status: SHIPPED | OUTPATIENT
Start: 2017-11-30 | End: 2018-04-03

## 2017-11-30 RX ORDER — AMLODIPINE BESYLATE 5 MG/1
5 TABLET ORAL DAILY
Qty: 90 TAB | Refills: 0 | Status: SHIPPED | OUTPATIENT
Start: 2017-11-30 | End: 2018-05-08 | Stop reason: SDUPTHER

## 2017-11-30 RX ORDER — POLYETHYLENE GLYCOL 3350 17 G/17G
17 POWDER, FOR SOLUTION ORAL DAILY
Qty: 30 EACH | Refills: 0 | Status: SHIPPED | OUTPATIENT
Start: 2017-11-30 | End: 2018-04-03

## 2017-11-30 RX ORDER — SERTRALINE HYDROCHLORIDE 100 MG/1
TABLET, FILM COATED ORAL
Qty: 30 TAB | Refills: 2 | Status: SHIPPED | OUTPATIENT
Start: 2017-11-30 | End: 2018-05-08 | Stop reason: SDUPTHER

## 2017-11-30 RX ORDER — POLYETHYLENE GLYCOL 3350 17 G/17G
17 POWDER, FOR SOLUTION ORAL DAILY
Qty: 90 PACKET | Refills: 2 | Status: SHIPPED | OUTPATIENT
Start: 2017-11-30 | End: 2018-04-03

## 2017-11-30 RX ORDER — CEPHALEXIN 500 MG/1
500 CAPSULE ORAL 3 TIMES DAILY
Qty: 30 CAP | Refills: 0 | Status: SHIPPED | OUTPATIENT
Start: 2017-11-30 | End: 2017-12-10

## 2017-11-30 RX ORDER — SERTRALINE HYDROCHLORIDE 100 MG/1
100 TABLET, FILM COATED ORAL DAILY
Qty: 90 TAB | Refills: 0 | Status: SHIPPED | OUTPATIENT
Start: 2017-11-30 | End: 2018-04-03

## 2017-11-30 RX ORDER — FLUCONAZOLE 150 MG/1
150 TABLET ORAL DAILY
Qty: 1 TAB | Refills: 0 | Status: SHIPPED | OUTPATIENT
Start: 2017-11-30 | End: 2017-12-01

## 2017-11-30 NOTE — PROGRESS NOTES
Chief Complaint   Patient presents with    Hypertension    Depression    Headache    Constipation     improved     1. Have you been to the ER, urgent care clinic since your last visit? Hospitalized since your last visit? No    2. Have you seen or consulted any other health care providers outside of the [address] [place] [address] since your last visit? Include any pap smears or colon screening.  No

## 2017-11-30 NOTE — MR AVS SNAPSHOT
Visit Information Date & Time Provider Department Dept. Phone Encounter #  
 11/30/2017  9:30 AM Jenna Barrientos, [address] [id number] Follow-up Instructions Return in about 8 weeks (around 1/25/2018), or if symptoms worsen or fail to improve. Upcoming Health Maintenance Date Due  
 PAP AKA CERVICAL CYTOLOGY 3/6/2020 DTaP/Tdap/Td series (2 - Td) 9/17/2023 Allergies as of 11/30/2017  Review Complete On: 11/30/2017 By: Polo Bates LPN Severity Noted Reaction Type Reactions Carrot  08/29/2017    Swelling Mouth and gum swelling Pcn [Penicillins]  08/01/2011    Other (comments) [place]  08/29/2017    Hives, Swelling Current Immunizations  Reviewed on 7/15/2016 Name Date Influenza Vaccine 8/24/2017 Influenza Vaccine (Quad) PF 10/7/2015 Pneumococcal Polysaccharide (PPSV-23) 7/15/2016 Tdap 9/17/2013 Not reviewed this visit You Were Diagnosed With   
  
 Codes Comments Essential hypertension    -  Primary ICD-10-CM: I10 
ICD-9-CM: 401.9 Other constipation     ICD-10-CM: K59.09 
ICD-9-CM: 564.09 Other depression     ICD-10-CM: F32.89 ICD-9-CM: 118 Tobacco use     ICD-10-CM: Z72.0 ICD-9-CM: 305.1 BMI 37.0-37.9, adult     ICD-10-CM: Z68.37 ICD-9-CM: V85.37 Screening for diabetes mellitus (DM)     ICD-10-CM: Z13.1 ICD-9-CM: V77.1 Carbuncle     ICD-10-CM: L02.93 
ICD-9-CM: 680.9 Vitals BP Pulse Temp Resp Height(growth percentile) Weight(growth percentile) (!) 142/100 (BP 1 Location: Left arm, BP Patient Position: Sitting) 72 97.8 °F (36.6 °C) (Oral) 16 5' 5\" (1.651 m) 224 lb (101.6 kg) SpO2 BMI OB Status Smoking Status 96% 37.28 kg/m2 Having regular periods Current Every Day Smoker Vitals History BMI and BSA Data Body Mass Index Body Surface Area  
 37.28 kg/m 2 2.16 m 2 Preferred Pharmacy Pharmacy Name Phone [address] [facility] [phone] Your Updated Medication List  
  
   
This list is accurate as of: 11/30/17 10:10 AM.  Always use your most recent med list. amLODIPine 5 mg tablet Commonly known as:  Loretta Spruce Take 1 Tab by mouth daily. Blood Pressure Monitor Kit Commonly known as:  Quick Response BP Monitor Check bp once daily  
  
 cephALEXin 500 mg capsule Commonly known as:  Elisa Craze Take 1 Cap by mouth three (3) times daily for 10 days. fluconazole 150 mg tablet Commonly known as:  DIFLUCAN Take 1 Tab by mouth daily for 1 day. FDA advises cautious prescribing of oral fluconazole in pregnancy. polyethylene glycol 17 gram packet Commonly known as:  Isabel Natalio Take 1 Packet by mouth daily. sertraline 100 mg tablet Commonly known as:  ZOLOFT  
take 1 tablet by mouth once daily Prescriptions Printed Refills  
 polyethylene glycol (MIRALAX) 17 gram packet 0 Sig: Take 1 Packet by mouth daily. Class: Print Route: Oral  
 sertraline (ZOLOFT) 100 mg tablet 2 Sig: take 1 tablet by mouth once daily Class: Print  
 amLODIPine (NORVASC) 5 mg tablet 0 Sig: Take 1 Tab by mouth daily. Class: Print Route: Oral  
 cephALEXin (KEFLEX) 500 mg capsule 0 Sig: Take 1 Cap by mouth three (3) times daily for 10 days. Class: Print Route: Oral  
 fluconazole (DIFLUCAN) 150 mg tablet 0 Sig: Take 1 Tab by mouth daily for 1 day. FDA advises cautious prescribing of oral fluconazole in pregnancy. Class: Print Route: Oral  
  
Follow-up Instructions Return in about 8 weeks (around 1/25/2018), or if symptoms worsen or fail to improve. To-Do List   
 11/30/2017 Lab:  HEMOGLOBIN A1C W/O EAG   
  
 11/30/2017 Lab:  LIPID PANEL   
  
 11/30/2017 Lab:  METABOLIC PANEL, COMPREHENSIVE Patient Instructions DASH Diet: Care Instructions Your Care Instructions The DASH diet is an eating plan that can help lower your blood pressure. DASH stands for Dietary Approaches to Stop Hypertension. Hypertension is high blood pressure. The DASH diet focuses on eating foods that are high in calcium, potassium, and magnesium. These nutrients can lower blood pressure. The foods that are highest in these nutrients are fruits, vegetables, low-fat dairy products, nuts, seeds, and legumes. But taking calcium, potassium, and magnesium supplements instead of eating foods that are high in those nutrients does not have the same effect. The DASH diet also includes whole grains, fish, and poultry. The DASH diet is one of several lifestyle changes your doctor may recommend to lower your high blood pressure. Your doctor may also want you to decrease the amount of sodium in your diet. Lowering sodium while following the DASH diet can lower blood pressure even further than just the DASH diet alone. Follow-up care is a key part of your treatment and safety. Be sure to make and go to all appointments, and call your doctor if you are having problems. It's also a good idea to know your test results and keep a list of the medicines you take. How can you care for yourself at home? Following the DASH diet · Eat 4 to 5 servings of fruit each day. A serving is 1 medium-sized piece of fruit, ½ cup chopped or canned fruit, 1/4 cup dried fruit, or 4 ounces (½ cup) of fruit juice. Choose fruit more often than fruit juice. · Eat 4 to 5 servings of vegetables each day. A serving is 1 cup of lettuce or raw leafy vegetables, ½ cup of chopped or cooked vegetables, or 4 ounces (½ cup) of vegetable juice. Choose vegetables more often than vegetable juice. · Get 2 to 3 servings of low-fat and fat-free dairy each day. A serving is 8 ounces of milk, 1 cup of yogurt, or 1 ½ ounces of cheese. · Eat 6 to 8 servings of grains each day.  A serving is 1 slice of bread, 1 ounce of dry cereal, or ½ cup of cooked rice, pasta, or cooked cereal. Try to choose whole-grain products as much as possible. · Limit lean meat, poultry, and fish to 2 servings each day. A serving is 3 ounces, about the size of a deck of cards. · Eat 4 to 5 servings of nuts, seeds, and legumes (cooked dried beans, lentils, and split peas) each week. A serving is 1/3 cup of nuts, 2 tablespoons of seeds, or ½ cup of cooked beans or peas. · Limit fats and oils to 2 to 3 servings each day. A serving is 1 teaspoon of vegetable oil or 2 tablespoons of salad dressing. · Limit sweets and added sugars to 5 servings or less a week. A serving is 1 tablespoon jelly or jam, ½ cup sorbet, or 1 cup of lemonade. · Eat less than 2,300 milligrams (mg) of sodium a day. If you limit your sodium to 1,500 mg a day, you can lower your blood pressure even more. Tips for success · Start small. Do not try to make dramatic changes to your diet all at once. You might feel that you are missing out on your favorite foods and then be more likely to not follow the plan. Make small changes, and stick with them. Once those changes become habit, add a few more changes. · Try some of the following: ¨ Make it a goal to eat a fruit or vegetable at every meal and at snacks. This will make it easy to get the recommended amount of fruits and vegetables each day. ¨ Try yogurt topped with fruit and nuts for a snack or healthy dessert. ¨ Add lettuce, tomato, cucumber, and onion to sandwiches. ¨ Combine a ready-made pizza crust with low-fat mozzarella cheese and lots of vegetable toppings. Try using tomatoes, squash, spinach, broccoli, carrots, cauliflower, and onions. ¨ Have a variety of cut-up vegetables with a low-fat dip as an appetizer instead of chips and dip. ¨ Sprinkle sunflower seeds or chopped almonds over salads. Or try adding chopped walnuts or almonds to cooked vegetables. ¨ Try some vegetarian meals using beans and peas. Add garbanzo or kidney beans to salads. Make burritos and tacos with mashed pitts beans or black beans. Where can you learn more? Go to http://sunita-kali.info/. Enter A506 in the search box to learn more about \"DASH Diet: Care Instructions. \" Current as of: September 21, 2016 Content Version: 11.4 © 0625-4079 [employer]. Care instructions adapted under license by [employer] (which disclaims liability or warranty for this information). If you have questions about a medical condition or this instruction, always ask your healthcare professional. [address] any warranty or liability for your use of this information. Introducing [facility]! [employer] introduces Mobile[employer]force patient portal. Now you can access parts of your medical record, email your doctor's office, and request medication refills online. 1. In your internet browser, go to [email or web address]. [facility]/Crispy Driven Pixels 2. Click on the First Time User? Click Here link in the Sign In box. You will see the New Member Sign Up page. 3. Enter your MobileDataforce Access Code exactly as it appears below. You will not need to use this code after youve completed the sign-up process. If you do not sign up before the expiration date, you must request a new code. · Mobile[employer] Access Code: [email or web address] Expires: 2/28/2018 10:10 AM 
 
4. Enter the last four digits of your Social Security Number (xxxx) and Date of Birth (mm/dd/yyyy) as indicated and click Submit. You will be taken to the next sign-up page. 5. Create a MobileDataforce ID. This will be your MobileDataforce login ID and cannot be changed, so think of one that is secure and easy to remember. 6. Create a Mobile[employer]force password. You can change your password at any time. 7. Enter your Password Reset Question and Answer. This can be used at a later time if you forget your password. 8. Enter your e-mail address. You will receive e-mail notification when new information is available in [address] 9. Click Sign Up. You can now view and download portions of your medical record. 10. Click the Download Summary menu link to download a portable copy of your medical information. If you have questions, please visit the Frequently Asked Questions section of the [employer] website. Remember, [employer] is NOT to be used for urgent needs. For medical emergencies, dial 911. Now available from your iPhone and Android! Please provide this summary of care documentation to your next provider. Your primary care clinician is listed as Violet Redmond. If you have any questions after today's visit, please call [phone].

## 2017-11-30 NOTE — PROGRESS NOTES
Neal Craig, 44 y.o.,  female    SUBJECTIVE  Ff-up    Painful cyst on L axillary area for past few days. Has not tried anything for this. No fever    HTN- says taking norvasc without problems. Says elevated today feeling stressed with kids. Depression for the past part doing well on zoloft. Constipation- reports improved diet, higher fiber. This has previously been evaluated by gi dr. Cristin Darnell, and recommended linzess. She says miralax more helpful. ROS:  See HPI, all others negative        Patient Active Problem List   Diagnosis Code    Tobacco use Z72.0    Tension headache G44.209    Depression F32.9    Essential hypertension I10    Chronic constipation K59.09       Current Outpatient Prescriptions   Medication Sig Dispense Refill    polyethylene glycol (MIRALAX) 17 gram packet Take 1 Packet by mouth daily. 30 Each 0    sertraline (ZOLOFT) 100 mg tablet take 1 tablet by mouth once daily 30 Tab 2    amLODIPine (NORVASC) 5 mg tablet Take 1 Tab by mouth daily. 90 Tab 0    cephALEXin (KEFLEX) 500 mg capsule Take 1 Cap by mouth three (3) times daily for 10 days. 30 Cap 0    fluconazole (DIFLUCAN) 150 mg tablet Take 1 Tab by mouth daily for 1 day. FDA advises cautious prescribing of oral fluconazole in pregnancy. 1 Tab 0    sertraline (ZOLOFT) 100 mg tablet Take 1 Tab by mouth daily. 90 Tab 0    polyethylene glycol (MIRALAX) 17 gram packet Take 1 Packet by mouth daily. 90 Packet 2    amLODIPine (NORVASC) 5 mg tablet Take 1 Tab by mouth daily.  90 Tab 0    Blood Pressure Monitor (QUICK RESPONSE BP MONITOR) kit Check bp once daily 1 Kit 0       Allergies   Allergen Reactions    Carrot Swelling     Mouth and gum swelling      Pcn [Penicillins] Other (comments)    Strawberry Hives and Swelling       Past Medical History:   Diagnosis Date    Cervical high risk human papillomavirus (HPV) DNA test positive 03/2017    rpt pap smear 3/2018    Chronic constipation     dr. Philomena chandra Contact dermatitis and other eczema, due to unspecified cause     Depression 12/2/2015    Essential hypertension 12/30/2015    GERD (gastroesophageal reflux disease) 9/13/2013    Gynecologic exam normal     [place]    Headache(784.0)        Social History     Social History    Marital status: SINGLE     Spouse name: N/A    Number of children: N/A    Years of education: N/A     Occupational History    Not on file. Social History Main Topics    Smoking status: Current Every Day Smoker    Smokeless tobacco: Never Used    Alcohol use Yes      Comment: 3/month    Drug use: No    Sexual activity: Yes     Partners: Male     Birth control/ protection: None     Other Topics Concern    Not on file     Social History Narrative       Family History   Problem Relation Age of Onset    Diabetes Mother     Hypertension Mother     Kidney Disease Mother      stones         OBJECTIVE    Physical Exam:     Visit Vitals    BP (!) 142/100 (BP 1 Location: Left arm, BP Patient Position: Sitting)    Pulse 72    Temp 97.8 °F (36.6 °C) (Oral)    Resp 16    Ht 5' 5\" (1.651 m)    Wt 224 lb (101.6 kg)    SpO2 96%    BMI 37.28 kg/m2       General: alert, well-appearing, obese,AA, in no apparent distress or pain  CVS: normal rate, regular rhythm, distinct S1 and S2  Lungs:clear to ausculation bilaterally, no crackles, wheezing or rhonchi noted  Abdomen: normoactive bowel sounds, soft, non-tender  Extremities: no edema, no cyanosis,  Skin: small tender cyst on L axilla  Psych:  mood and affect normal        ASSESSMENT/PLAN  Diagnoses and all orders for this visit:    1. Essential hypertension  Elevated today, monitoring  Cont norvasc for now/DASH diet  -     amLODIPine (NORVASC) 5 mg tablet; Take 1 Tab by mouth daily.  -     METABOLIC PANEL, COMPREHENSIVE; Future  -     LIPID PANEL; Future    2. Other constipation  Increase fiber/fluids in diet  Cont:  -     polyethylene glycol (MIRALAX) 17 gram packet;  Take 1 Packet by mouth daily. 3. Other depression  Fair control, cont:  -     sertraline (ZOLOFT) 100 mg tablet; take 1 tablet by mouth once daily    4. Tobacco use  Discussed cessation    5. BMI 37.0-37.9, adult  Encouraged wt  Loss/ diet  6. Screening for diabetes mellitus (DM)  -     HEMOGLOBIN A1C W/O EAG; Future    7. Carbuncle  Warm compress and keflex  Requesting diflucan for possible yeast infection    Orders:  -     cephALEXin (KEFLEX) 500 mg capsule; Take 1 Cap by mouth three (3) times daily for 10 days. -     fluconazole (DIFLUCAN) 150 mg tablet; Take 1 Tab by mouth daily for 1 day. FDA advises cautious prescribing of oral fluconazole in pregnancy. -     sertraline (ZOLOFT) 100 mg tablet; Take 1 Tab by mouth daily. -     polyethylene glycol (MIRALAX) 17 gram packet; Take 1 Packet by mouth daily. -     amLODIPine (NORVASC) 5 mg tablet; Take 1 Tab by mouth daily. Follow-up Disposition:  Return in about 8 weeks (around 1/25/2018), or if symptoms worsen or fail to improve. Patient understands plan of care. Patient has provided input and agrees with goals.

## 2017-11-30 NOTE — PATIENT INSTRUCTIONS

## 2018-01-24 ENCOUNTER — DOCUMENTATION ONLY (OUTPATIENT)
Dept: FAMILY MEDICINE CLINIC | Age: 38
End: 2018-01-24

## 2018-02-21 NOTE — PROGRESS NOTES
Pt call requesting to have her lab order printed and either her  or son/ both named Asim Morenohouse/ to pick it up. Advised that it was ok this time but that she needed to add them to her hipaa form for the future needs. unchanged

## 2018-04-17 PROBLEM — N94.5 SECONDARY DYSMENORRHEA: Chronic | Status: ACTIVE | Noted: 2018-04-17

## 2018-04-17 PROBLEM — N93.9 ABNORMAL UTERINE BLEEDING (AUB): Chronic | Status: ACTIVE | Noted: 2018-04-17

## 2018-05-08 ENCOUNTER — OFFICE VISIT (OUTPATIENT)
Dept: FAMILY MEDICINE CLINIC | Age: 38
End: 2018-05-08

## 2018-05-08 VITALS
RESPIRATION RATE: 15 BRPM | BODY MASS INDEX: 35.96 KG/M2 | DIASTOLIC BLOOD PRESSURE: 88 MMHG | OXYGEN SATURATION: 98 % | WEIGHT: 215.8 LBS | HEIGHT: 65 IN | HEART RATE: 65 BPM | TEMPERATURE: 98 F | SYSTOLIC BLOOD PRESSURE: 136 MMHG

## 2018-05-08 DIAGNOSIS — F32.89 OTHER DEPRESSION: ICD-10-CM

## 2018-05-08 DIAGNOSIS — I10 ESSENTIAL HYPERTENSION: Primary | ICD-10-CM

## 2018-05-08 DIAGNOSIS — Z13.220 SCREENING FOR LIPID DISORDERS: ICD-10-CM

## 2018-05-08 DIAGNOSIS — E66.01 SEVERE OBESITY (BMI 35.0-39.9): ICD-10-CM

## 2018-05-08 DIAGNOSIS — Z72.0 TOBACCO USE: ICD-10-CM

## 2018-05-08 DIAGNOSIS — F33.0 MILD EPISODE OF RECURRENT MAJOR DEPRESSIVE DISORDER (HCC): ICD-10-CM

## 2018-05-08 DIAGNOSIS — Z13.1 SCREENING FOR DIABETES MELLITUS (DM): ICD-10-CM

## 2018-05-08 RX ORDER — AMLODIPINE BESYLATE 5 MG/1
5 TABLET ORAL DAILY
Qty: 90 TAB | Refills: 0 | Status: SHIPPED | OUTPATIENT
Start: 2018-05-08 | End: 2018-05-08 | Stop reason: SDUPTHER

## 2018-05-08 RX ORDER — SERTRALINE HYDROCHLORIDE 100 MG/1
TABLET, FILM COATED ORAL
Qty: 90 TAB | Refills: 0 | Status: SHIPPED | OUTPATIENT
Start: 2018-05-08 | End: 2018-10-10 | Stop reason: SDUPTHER

## 2018-05-08 RX ORDER — AMLODIPINE BESYLATE 5 MG/1
5 TABLET ORAL DAILY
Qty: 90 TAB | Refills: 0 | Status: SHIPPED | OUTPATIENT
Start: 2018-05-08 | End: 2018-10-10 | Stop reason: SDUPTHER

## 2018-05-08 RX ORDER — SERTRALINE HYDROCHLORIDE 100 MG/1
TABLET, FILM COATED ORAL
Qty: 90 TAB | Refills: 0 | Status: SHIPPED | OUTPATIENT
Start: 2018-05-08 | End: 2018-05-08 | Stop reason: SDUPTHER

## 2018-05-08 RX ORDER — SERTRALINE HYDROCHLORIDE 100 MG/1
TABLET, FILM COATED ORAL
Qty: 30 TAB | Refills: 2 | Status: CANCELLED | OUTPATIENT
Start: 2018-05-08

## 2018-05-08 NOTE — PROGRESS NOTES
Chief Complaint   Patient presents with    Hypertension    Knee Pain     RT knee pain, started on 4/23/18     1. Have you been to the ER, urgent care clinic since your last visit? Hospitalized since your last visit? No    2. Have you seen or consulted any other health care providers outside of the [facility] since your last visit? Include any pap smears or colon screening.  No

## 2018-05-08 NOTE — PROGRESS NOTES
Les Wooten, 43 y.o.,  female    SUBJECTIVE  Ff-up    HTN-says ran out of her norvasc 2 days ago. Does not check BP at home, still struggles with diet. Continues to smoke about cig/day. Forgot to have labs drawn. Depression-says doing well on zoloft. , single mom, stressors are manageable. Constipation has improved on benefiber    ROS:  See HPI, all others negative        Patient Active Problem List   Diagnosis Code    Tobacco use Z72.0    Tension headache G44.209    Depression F32.9    Essential hypertension I10    Chronic constipation K59.09    Abnormal uterine bleeding (AUB) N93.9    Secondary dysmenorrhea N94.5    Severe obesity (BMI 35.0-39. 9) with comorbidity (HCC) E66.01    Mild episode of recurrent major depressive disorder ([address] F33.0       Current Outpatient Prescriptions   Medication Sig Dispense Refill    metronidazole (FLAGYL PO) Take  by mouth.  amLODIPine (NORVASC) 5 mg tablet Take 1 Tab by mouth daily. 90 Tab 0    sertraline (ZOLOFT) 100 mg tablet take 1 tablet by mouth once daily 90 Tab 0    acetaminophen (TYLENOL EXTRA STRENGTH) 500 mg tablet Take  by mouth as needed for Pain (headaches & pelvic pain). Allergies   Allergen Reactions    Carrot Swelling     Mouth and gum swelling      Pcn [Penicillins] Other (comments)     Patient states not allergic but gets candidiasis from it.      Strawberry Hives and Swelling       Past Medical History:   Diagnosis Date    Anxiety and depression     Bruises easily     Cervical high risk human papillomavirus (HPV) DNA test positive 03/2017    rpt pap smear 3/2018    Chronic constipation     dr. Daniel chandra    Contact dermatitis and other eczema, due to unspecified cause     Depression 12/2/2015    Essential hypertension 12/30/2015    GERD (gastroesophageal reflux disease) 9/13/2013    Gynecologic exam normal     [place]    Headache(784.0)     Heavy menses     Hx of migraines     Irregular menses     Ovarian cyst     Pelvic pain        Social History     Social History    Marital status: SINGLE     Spouse name: N/A    Number of children: N/A    Years of education: N/A     Occupational History    Not on file. Social History Main Topics    Smoking status: Current Every Day Smoker     Packs/day: 0.08     Years: 10.00    Smokeless tobacco: Never Used      Comment: smokes cigars - 1.5 a day    Alcohol use Yes      Comment: occasionally    Drug use: No    Sexual activity: Not on file     Other Topics Concern    Not on file     Social History Narrative       Family History   Problem Relation Age of Onset    Diabetes Mother     Hypertension Mother     Kidney Disease Mother      stones    Hypertension Father     Diabetes Sister     Hypertension Sister     Stroke Sister          OBJECTIVE    Physical Exam:     Visit Vitals    /88 (BP 1 Location: Left arm, BP Patient Position: Sitting)    Pulse 65    Temp 98 °F (36.7 °C) (Oral)    Resp 15    Ht 5' 5\" (1.651 m)    Wt 215 lb 12.8 oz (97.9 kg)    SpO2 98%    BMI 35.91 kg/m2       General: alert, well-appearing, obese,AA, in no apparent distress or pain  CVS: normal rate, regular rhythm, distinct S1 and S2  Lungs:clear to ausculation bilaterally, no crackles, wheezing or rhonchi noted  Abdomen: normoactive bowel sounds, soft, non-tender  Extremities: no edema, no cyanosis,  Skin: small tender cyst on L axilla  Psych:  mood and affect normal        ASSESSMENT/PLAN  Diagnoses and all orders for this visit:    1. Essential hypertension  Elevated today, off medication  Resume norvasc 10 mg  Monitoring, DASH diet  -     METABOLIC PANEL, COMPREHENSIVE; Future  -     LIPID PANEL; Future    2.  Other constipation  Increase fiber/fluids in diet  Cont prn miralax/benefiber  Previous GI work up neg, linzess prescribed ineffective    3.major depression  Controlled  Cont:     sertraline (ZOLOFT) 100 mg tablet; take 1 tablet by mouth once daily    4. Tobacco use  Discussed cessation  pcv vaccine utd, annual flu vaccine    5. BMI 35  Encouraged wt  Loss/ diet    6. Screening for diabetes mellitus (DM)  -     HEMOGLOBIN A1C W/O EAG; Future      Follow-up Disposition:  Return in about 2 weeks (around 5/22/2018), or if symptoms worsen or fail to improve, plan for CPE then. .      Patient understands plan of care. Patient has provided input and agrees with goals.

## 2018-05-08 NOTE — MR AVS SNAPSHOT
[facility] [address] 
[phone] Patient: Nolvia Burnham MRN: [id number] LAS:[birth year] Visit Information Date & Time Provider Department Dept. Phone Encounter #  
 5/8/2018  3:45 PM Millie Hayes, [address] [id number] Follow-up Instructions Return in about 2 weeks (around 5/22/2018), or if symptoms worsen or fail to improve, plan for CPE then. Krysten Jhony Upcoming Health Maintenance Date Due Influenza Age 5 to Adult 8/1/2018 PAP AKA CERVICAL CYTOLOGY 3/6/2020 DTaP/Tdap/Td series (2 - Td) 9/17/2023 Allergies as of 5/8/2018  Review Complete On: 5/8/2018 By: Millie Hayes MD  
  
 Severity Noted Reaction Type Reactions Carrot  08/29/2017    Swelling Mouth and gum swelling Pcn [Penicillins]  08/01/2011    Other (comments) Patient states not allergic but gets candidiasis from it. [place]  08/29/2017    Hives, Swelling Current Immunizations  Reviewed on 7/15/2016 Name Date Influenza Vaccine 8/24/2017 Influenza Vaccine (Quad) PF 10/7/2015 Pneumococcal Polysaccharide (PPSV-23) 7/15/2016 Tdap 9/17/2013 Not reviewed this visit You Were Diagnosed With   
  
 Codes Comments Essential hypertension    -  Primary ICD-10-CM: I10 
ICD-9-CM: 401.9 Other depression     ICD-10-CM: F32.89 ICD-9-CM: 032 Tobacco use     ICD-10-CM: Z72.0 ICD-9-CM: 305.1 Mild episode of recurrent major depressive disorder (HCC)     ICD-10-CM: F33.0 ICD-9-CM: 296.31 Screening for lipid disorders     ICD-10-CM: Z13.220 ICD-9-CM: V77.91 Screening for diabetes mellitus (DM)     ICD-10-CM: Z13.1 ICD-9-CM: V77.1 Vitals BP Pulse Temp Resp Height(growth percentile) Weight(growth percentile)  136/88 (BP 1 Location: Left arm, BP Patient Position: Sitting) 65 98 °F (36.7 °C) (Oral) 15 5' 5\" (1.651 m) 215 lb 12.8 oz (97.9 kg) SpO2 BMI OB Status Smoking Status 98% 35.91 kg/m2 Unknown Current Every Day Smoker Vitals History BMI and BSA Data Body Mass Index Body Surface Area  
 35.91 kg/m 2 2.12 m 2 Preferred Pharmacy Pharmacy Name Phone [address] [facility] [phone] Your Updated Medication List  
  
   
This list is accurate as of 5/8/18  4:46 PM.  Always use your most recent med list. amLODIPine 5 mg tablet Commonly known as:  [place] Take 1 Tab by mouth daily. FLAGYL PO Take  by mouth. sertraline 100 mg tablet Commonly known as:  ZOLOFT  
take 1 tablet by mouth once daily TYLENOL EXTRA STRENGTH 500 mg tablet Generic drug:  acetaminophen Take  by mouth as needed for Pain (headaches & pelvic pain). Prescriptions Printed Refills  
 amLODIPine (NORVASC) 5 mg tablet 0 Sig: Take 1 Tab by mouth daily. Class: Print Route: Oral  
 sertraline (ZOLOFT) 100 mg tablet 0 Sig: take 1 tablet by mouth once daily Class: Print Follow-up Instructions Return in about 2 weeks (around 5/22/2018), or if symptoms worsen or fail to improve, plan for CPE then. Jose Antonio Weir To-Do List   
 05/08/2018 Lab:  HEMOGLOBIN A1C W/O EAG   
  
 05/08/2018 Lab:  LIPID PANEL   
  
 05/08/2018 Lab:  METABOLIC PANEL, COMPREHENSIVE Patient Instructions DASH Diet: Care Instructions Your Care Instructions The DASH diet is an eating plan that can help lower your blood pressure. DASH stands for Dietary Approaches to Stop Hypertension. Hypertension is high blood pressure. The DASH diet focuses on eating foods that are high in calcium, potassium, and magnesium. These nutrients can lower blood pressure.  The foods that are highest in these nutrients are fruits, vegetables, low-fat dairy products, nuts, seeds, and legumes. But taking calcium, potassium, and magnesium supplements instead of eating foods that are high in those nutrients does not have the same effect. The DASH diet also includes whole grains, fish, and poultry. The DASH diet is one of several lifestyle changes your doctor may recommend to lower your high blood pressure. Your doctor may also want you to decrease the amount of sodium in your diet. Lowering sodium while following the DASH diet can lower blood pressure even further than just the DASH diet alone. Follow-up care is a key part of your treatment and safety. Be sure to make and go to all appointments, and call your doctor if you are having problems. It's also a good idea to know your test results and keep a list of the medicines you take. How can you care for yourself at home? Following the DASH diet · Eat 4 to 5 servings of fruit each day. A serving is 1 medium-sized piece of fruit, ½ cup chopped or canned fruit, 1/4 cup dried fruit, or 4 ounces (½ cup) of fruit juice. Choose fruit more often than fruit juice. · Eat 4 to 5 servings of vegetables each day. A serving is 1 cup of lettuce or raw leafy vegetables, ½ cup of chopped or cooked vegetables, or 4 ounces (½ cup) of vegetable juice. Choose vegetables more often than vegetable juice. · Get 2 to 3 servings of low-fat and fat-free dairy each day. A serving is 8 ounces of milk, 1 cup of yogurt, or 1 ½ ounces of cheese. · Eat 6 to 8 servings of grains each day. A serving is 1 slice of bread, 1 ounce of dry cereal, or ½ cup of cooked rice, pasta, or cooked cereal. Try to choose whole-grain products as much as possible. · Limit lean meat, poultry, and fish to 2 servings each day. A serving is 3 ounces, about the size of a deck of cards. · Eat 4 to 5 servings of nuts, seeds, and legumes (cooked dried beans, lentils, and split peas) each week.  A serving is 1/3 cup of nuts, 2 tablespoons of seeds, or ½ cup of cooked beans or peas. · Limit fats and oils to 2 to 3 servings each day. A serving is 1 teaspoon of vegetable oil or 2 tablespoons of salad dressing. · Limit sweets and added sugars to 5 servings or less a week. A serving is 1 tablespoon jelly or jam, ½ cup sorbet, or 1 cup of lemonade. · Eat less than 2,300 milligrams (mg) of sodium a day. If you limit your sodium to 1,500 mg a day, you can lower your blood pressure even more. Tips for success · Start small. Do not try to make dramatic changes to your diet all at once. You might feel that you are missing out on your favorite foods and then be more likely to not follow the plan. Make small changes, and stick with them. Once those changes become habit, add a few more changes. · Try some of the following: ¨ Make it a goal to eat a fruit or vegetable at every meal and at snacks. This will make it easy to get the recommended amount of fruits and vegetables each day. ¨ Try yogurt topped with fruit and nuts for a snack or healthy dessert. ¨ Add lettuce, tomato, cucumber, and onion to sandwiches. ¨ Combine a ready-made pizza crust with low-fat mozzarella cheese and lots of vegetable toppings. Try using tomatoes, squash, spinach, broccoli, carrots, cauliflower, and onions. ¨ Have a variety of cut-up vegetables with a low-fat dip as an appetizer instead of chips and dip. ¨ Sprinkle sunflower seeds or chopped almonds over salads. Or try adding chopped walnuts or almonds to cooked vegetables. ¨ Try some vegetarian meals using beans and peas. Add garbanzo or kidney beans to salads. Make burritos and tacos with mashed pitts beans or black beans. Where can you learn more? Go to http://sunita-kali.info/. Enter L517 in the search box to learn more about \"DASH Diet: Care Instructions. \" Current as of: September 21, 2016 Content Version: 11.4 © 4178-1776 [employer].  Care instructions adapted under license by [address] (which disclaims liability or warranty for this information). If you have questions about a medical condition or this instruction, always ask your healthcare professional. [address] any warranty or liability for your use of this information. Introducing \[facility]\"" & HEALTH SERVICES! [facility] introduces PulsePoint patient portal. Now you can access parts of your medical record, email your doctor's office, and request medication refills online. 1. In your internet browser, go to [email or web address] 2. Click on the First Time User? Click Here link in the Sign In box. You will see the New Member Sign Up page. 3. Enter your PulsePoint Access Code exactly as it appears below. You will not need to use this code after youve completed the sign-up process. If you do not sign up before the expiration date, you must request a new code. · PulsePoint Access Code: [email or web address] Expires: 7/2/2018  5:12 PM 
 
4. Enter the last four digits of your Social Security Number (xxxx) and Date of Birth (mm/dd/yyyy) as indicated and click Submit. You will be taken to the next sign-up page. 5. Create a PulsePoint ID. This will be your PulsePoint login ID and cannot be changed, so think of one that is secure and easy to remember. 6. Create a PulsePoint password. You can change your password at any time. 7. Enter your Password Reset Question and Answer. This can be used at a later time if you forget your password. 8. Enter your e-mail address. You will receive e-mail notification when new information is available in [address] 9. Click Sign Up. You can now view and download portions of your medical record. 10. Click the Download Summary menu link to download a portable copy of your medical information. If you have questions, please visit the Frequently Asked Questions section of the PulsePoint website.  Remember, PulsePoint is NOT to be used for urgent needs. For medical emergencies, dial 911. Now available from your iPhone and Android! Please provide this summary of care documentation to your next provider. Your primary care clinician is listed as Shira Grider. If you have any questions after today's visit, please call [phone].

## 2018-05-08 NOTE — PATIENT INSTRUCTIONS

## 2018-10-10 DIAGNOSIS — I10 ESSENTIAL HYPERTENSION: ICD-10-CM

## 2018-10-10 DIAGNOSIS — F32.89 OTHER DEPRESSION: ICD-10-CM

## 2018-10-10 NOTE — TELEPHONE ENCOUNTER
This patient contacted office for the following prescriptions to be filled:    Medication requested :   Requested Prescriptions     Pending Prescriptions Disp Refills    amLODIPine (NORVASC) 5 mg tablet 90 Tab 0     Sig: Take 1 Tab by mouth daily.  sertraline (ZOLOFT) 100 mg tablet 90 Tab 0     Sig: take 1 tablet by mouth once daily     PCP: [address] [place] [address] or Print: On Site [facility]   Mail order or Local pharmacy [address] [place] [address],   [address] [facility]    Scheduled appointment if not seen by current providers in office:  LOV 5/8/2018 f/u 11/5/2018. Pt made an appt in November but needs a refill to hokd her over till then. Please advise. If not filling.

## 2018-10-11 RX ORDER — SERTRALINE HYDROCHLORIDE 100 MG/1
TABLET, FILM COATED ORAL
Qty: 90 TAB | Refills: 0 | Status: SHIPPED | OUTPATIENT
Start: 2018-10-11 | End: 2018-11-05 | Stop reason: ALTCHOICE

## 2018-10-11 RX ORDER — AMLODIPINE BESYLATE 5 MG/1
5 TABLET ORAL DAILY
Qty: 90 TAB | Refills: 0 | Status: SHIPPED | OUTPATIENT
Start: 2018-10-11 | End: 2019-02-12 | Stop reason: SDUPTHER

## 2018-11-05 ENCOUNTER — OFFICE VISIT (OUTPATIENT)
Dept: FAMILY MEDICINE CLINIC | Age: 38
End: 2018-11-05

## 2018-11-05 VITALS
WEIGHT: 201 LBS | TEMPERATURE: 98.2 F | BODY MASS INDEX: 33.49 KG/M2 | HEIGHT: 65 IN | RESPIRATION RATE: 16 BRPM | SYSTOLIC BLOOD PRESSURE: 138 MMHG | HEART RATE: 74 BPM | OXYGEN SATURATION: 98 % | DIASTOLIC BLOOD PRESSURE: 96 MMHG

## 2018-11-05 DIAGNOSIS — Z23 ENCOUNTER FOR IMMUNIZATION: ICD-10-CM

## 2018-11-05 DIAGNOSIS — R10.84 GENERALIZED ABDOMINAL PAIN: ICD-10-CM

## 2018-11-05 DIAGNOSIS — F33.1 MODERATE EPISODE OF RECURRENT MAJOR DEPRESSIVE DISORDER (HCC): Primary | ICD-10-CM

## 2018-11-05 DIAGNOSIS — G47.9 SLEEPING DIFFICULTY: ICD-10-CM

## 2018-11-05 RX ORDER — CITALOPRAM 20 MG/1
20 TABLET, FILM COATED ORAL DAILY
Qty: 45 TAB | Refills: 0 | Status: SHIPPED | OUTPATIENT
Start: 2018-11-05 | End: 2019-02-12

## 2018-11-05 RX ORDER — TRAZODONE HYDROCHLORIDE 50 MG/1
50 TABLET ORAL
Qty: 30 TAB | Refills: 0 | Status: SHIPPED | OUTPATIENT
Start: 2018-11-05 | End: 2018-11-29

## 2018-11-05 NOTE — PATIENT INSTRUCTIONS
Vaccine Information Statement    Influenza (Flu) Vaccine (Inactivated or Recombinant): What you need to know    Many Vaccine Information Statements are available in [place] and other languages. See www.immunize.org/vis  Hojas de Información Sobre Vacunas están disponibles en Español y en muchos otros idiomas. Visite www.immunize.org/vis    1. Why get vaccinated? Influenza (flu) is a contagious disease that spreads around the [place] every year, usually between October and May. Flu is caused by influenza viruses, and is spread mainly by coughing, sneezing, and close contact. Anyone can get flu. Flu strikes suddenly and can last several days. Symptoms vary by age, but can include:   fever/chills   sore throat   muscle aches   fatigue   cough   headache    runny or stuffy nose    Flu can also lead to pneumonia and blood infections, and cause diarrhea and seizures in children. If you have a medical condition, such as heart or lung disease, flu can make it worse. Flu is more dangerous for some people. Infants and young children, people 72years of age and older, pregnant women, and people with certain health conditions or a weakened immune system are at greatest risk. Each year thousands of people in the [facility] die from flu, and many more are hospitalized. Flu vaccine can:   keep you from getting flu,   make flu less severe if you do get it, and   keep you from spreading flu to your family and other people. 2. Inactivated and recombinant flu vaccines    A dose of flu vaccine is recommended every flu season. Children 6 months through 6years of age may need two doses during the same flu season. Everyone else needs only one dose each flu season.        Some inactivated flu vaccines contain a very small amount of a mercury-based preservative called thimerosal. Studies have not shown thimerosal in vaccines to be harmful, but flu vaccines that do not contain thimerosal are available. There is no live flu virus in flu shots. They cannot cause the flu. There are many flu viruses, and they are always changing. Each year a new flu vaccine is made to protect against three or four viruses that are likely to cause disease in the upcoming flu season. But even when the vaccine doesnt exactly match these viruses, it may still provide some protection    Flu vaccine cannot prevent:   flu that is caused by a virus not covered by the vaccine, or   illnesses that look like flu but are not. It takes about 2 weeks for protection to develop after vaccination, and protection lasts through the flu season. 3. Some people should not get this vaccine    Tell the person who is giving you the vaccine:     If you have any severe, life-threatening allergies. If you ever had a life-threatening allergic reaction after a dose of flu vaccine, or have a severe allergy to any part of this vaccine, you may be advised not to get vaccinated. Most, but not all, types of flu vaccine contain a small amount of egg protein.  If you ever had Guillain-Barré Syndrome (also called GBS). Some people with a history of GBS should not get this vaccine. This should be discussed with your doctor.  If you are not feeling well. It is usually okay to get flu vaccine when you have a mild illness, but you might be asked to come back when you feel better. 4. Risks of a vaccine reaction    With any medicine, including vaccines, there is a chance of reactions. These are usually mild and go away on their own, but serious reactions are also possible. Most people who get a flu shot do not have any problems with it.      Minor problems following a flu shot include:    soreness, redness, or swelling where the shot was given     hoarseness   sore, red or itchy eyes   cough   fever   aches   headache   itching   fatigue  If these problems occur, they usually begin soon after the shot and last 1 or 2 days. More serious problems following a flu shot can include the following:     There may be a small increased risk of Guillain-Barré Syndrome (GBS) after inactivated flu vaccine. This risk has been estimated at 1 or 2 additional cases per million people vaccinated. This is much lower than the risk of severe complications from flu, which can be prevented by flu vaccine.  Young children who get the flu shot along with pneumococcal vaccine (PCV13) and/or DTaP vaccine at the same time might be slightly more likely to have a seizure caused by fever. Ask your doctor for more information. Tell your doctor if a child who is getting flu vaccine has ever had a seizure. Problems that could happen after any injected vaccine:      People sometimes faint after a medical procedure, including vaccination. Sitting or lying down for about 15 minutes can help prevent fainting, and injuries caused by a fall. Tell your doctor if you feel dizzy, or have vision changes or ringing in the ears.  Some people get severe pain in the shoulder and have difficulty moving the arm where a shot was given. This happens very rarely.  Any medication can cause a severe allergic reaction. Such reactions from a vaccine are very rare, estimated at about 1 in a million doses, and would happen within a few minutes to a few hours after the vaccination. As with any medicine, there is a very remote chance of a vaccine causing a serious injury or death. The safety of vaccines is always being monitored. For more information, visit: www.cdc.gov/vaccinesafety/    5. What if there is a serious reaction? What should I look for?  Look for anything that concerns you, such as signs of a severe allergic reaction, very high fever, or unusual behavior.     Signs of a severe allergic reaction can include hives, swelling of the face and throat, difficulty breathing, a fast heartbeat, dizziness, and weakness - usually within a few minutes to a few hours after the vaccination. What should I do?  If you think it is a severe allergic reaction or other emergency that cant wait, call 9-1-1 and get the person to the nearest hospital. Otherwise, call your doctor.  Reactions should be reported to the Vaccine Adverse Event Reporting System (VAERS). Your doctor should file this report, or you can do it yourself through  the VAERS web site at www.vaers. [facility].gov, or by calling 7-801.925.5542. VAERS does not give medical advice. 6. The National Vaccine Injury Compensation Program    The [facility] Vaccine Injury Compensation Program (VICP) is a federal program that was created to compensate people who may have been injured by certain vaccines. Persons who believe they may have been injured by a vaccine can learn about the program and about filing a claim by calling 5-178.925.7154 or visiting the [employer] website at www.[facility].gov/vaccinecompensation. There is a time limit to file a claim for compensation. 7. How can I learn more?  Ask your healthcare provider. He or she can give you the vaccine package insert or suggest other sources of information.  Call your local or state health department.  Contact the Centers for Disease Control and Prevention (CDC):  - Call 6-429.454.2025 (1-800-CDC-INFO) or  - Visit CDCs website at www.cdc.gov/flu    Vaccine Information Statement   Inactivated Influenza Vaccine   8/7/2015  41 JENNIFER ViveorsMelisa Tony [id number]    Department of Health and Human Services  Centers for Disease Control and Prevention    Office Use Only

## 2018-11-05 NOTE — PROGRESS NOTES
1. Have you been to the ER, urgent care clinic since your last visit? Hospitalized since your last visit? No    2. Have you seen or consulted any other health care providers outside of the [facility] since your last visit? Include any pap smears or colon screening. No    Flulaval 0.5 ml given IM in left deltoid. Lot # [id number], exp date 06/30/2019. Patient tolerated injection well. No adverse reaction noted.

## 2018-11-05 NOTE — PROGRESS NOTES
Kelly Fulton, 43 y.o.,  female    SUBJECTIVE  Ff-up    Depression- reports to be feeling worse, past 2 months, following sudden passing of sister. She thinks zoloft is no longer effective and wants to try something else. She is hardly sleeping, crying most of the time, feeling angry and irritable. She is having increased headaches and abdominal pain, similar to previous (Previous GI consult done negative). She denies harmful ideation. She wants to see psychiatrist. Has tried effexor, wellbutrin in the past.     HTN-continues to take norvasc, smokes socially. Did not get labs drawn as discussed on last visit in may. ROS:  See HPI, all others negative        Patient Active Problem List   Diagnosis Code    Tobacco use Z72.0    Tension headache G44.209    Depression F32.9    Essential hypertension I10    Chronic constipation K59.09    Abnormal uterine bleeding (AUB) N93.9    Secondary dysmenorrhea N94.5    Severe obesity (BMI 35.0-39. 9) with comorbidity (HCC) E66.01    Mild episode of recurrent major depressive disorder ([facility] [address] F33.0    Severe obesity (BMI 35.0-39. 9) E66.01       Current Outpatient Medications   Medication Sig Dispense Refill    citalopram (CELEXA) 20 mg tablet Take 1 Tab by mouth daily. 45 Tab 0    traZODone (DESYREL) 50 mg tablet Take 1 Tab by mouth nightly. 30 Tab 0    amLODIPine (NORVASC) 5 mg tablet Take 1 Tab by mouth daily. 90 Tab 0    acetaminophen (TYLENOL EXTRA STRENGTH) 500 mg tablet Take  by mouth as needed for Pain (headaches & pelvic pain).  metronidazole (FLAGYL PO) Take  by mouth. Allergies   Allergen Reactions    Carrot Swelling     Mouth and gum swelling      Pcn [Penicillins] Other (comments)     Patient states not allergic but gets candidiasis from it.      Strawberry Hives and Swelling       Past Medical History:   Diagnosis Date    Anxiety and depression     Bruises easily     Cervical high risk human papillomavirus (HPV) DNA test positive 03/2017    rpt pap smear 3/2018    Chronic constipation     dr. Mary chandra    Contact dermatitis and other eczema, due to unspecified cause     Depression 12/2/2015    Essential hypertension 12/30/2015    GERD (gastroesophageal reflux disease) 9/13/2013    Gynecologic exam normal     [place]    Headache(784.0)     Heavy menses     Hx of migraines     Irregular menses     Ovarian cyst     Pelvic pain        Social History     Socioeconomic History    Marital status: SINGLE     Spouse name: Not on file    Number of children: Not on file    Years of education: Not on file    Highest education level: Not on file   Social Needs    Financial resource strain: Not on file    Food insecurity - worry: Not on file    Food insecurity - inability: Not on file    Transportation needs - medical: Not on file   [employer] needs - non-medical: Not on file   Occupational History    Not on file   Tobacco Use    Smoking status: Current Every Day Smoker     Packs/day: 0.08     Years: 10.00     Pack years: 0.80    Smokeless tobacco: Never Used    Tobacco comment: smokes cigars - 1.5 a day   Substance and Sexual Activity    Alcohol use: Yes     Comment: occasionally    Drug use: No    Sexual activity: Not on file   Other Topics Concern    Not on file   Social History Narrative    Not on file       Family History   Problem Relation Age of Onset    Diabetes Mother     Hypertension Mother     Kidney Disease Mother         stones    Hypertension Father     Diabetes Sister     Hypertension Sister     Stroke Sister          OBJECTIVE    Physical Exam:     Visit Vitals  BP (!) 138/96 (BP 1 Location: Left arm, BP Patient Position: Sitting)   Pulse 74   Temp 98.2 °F (36.8 °C) (Oral)   Resp 16   Ht 5' 5\" (1.651 m)   Wt 201 lb (91.2 kg)   SpO2 98%   BMI 33.45 kg/m²       General: alert, well-appearing, obese,AA, in no apparent distress or pain  CVS: normal rate, regular rhythm, distinct S1 and S2  Lungs:clear to ausculation bilaterally, no crackles, wheezing or rhonchi noted  Abdomen: normoactive bowel sounds, soft, non-tender  Extremities: no edema, no cyanosis,  Skin: small tender cyst on L axilla  Psych:  mood and affect normal        ASSESSMENT/PLAN  Diagnoses and all orders for this visit:    1. Moderate episode of recurrent major depressive disorder (HCC)  -     REFERRAL TO PSYCHOLOGY  -     REFERRAL TO PSYCHIATRY  Switch zoloft to celexa, try trazodone qhs for sleep    2. Encounter for immunization  -     INFLUENZA VIRUS VAC QUAD,SPLIT,PRESV FREE SYRINGE IM    3. Sleeping difficulty    4. Generalized abdominal pain  -     CBC WITH AUTOMATED DIFF; Future  -     LIPASE; Future  Reprinted previous lab order CMP/Lipid/A1c    Other orders  -     citalopram (CELEXA) 20 mg tablet; Take 1 Tab by mouth daily. -     traZODone (DESYREL) 50 mg tablet; Take 1 Tab by mouth nightly. Follow-up Disposition:  Return in about 2 weeks (around 11/19/2018), or if symptoms worsen or fail to improve. Patient understands plan of care. Patient has provided input and agrees with goals.

## 2018-11-29 ENCOUNTER — OFFICE VISIT (OUTPATIENT)
Dept: FAMILY MEDICINE CLINIC | Age: 38
End: 2018-11-29

## 2018-11-29 VITALS
OXYGEN SATURATION: 99 % | WEIGHT: 205 LBS | HEART RATE: 63 BPM | SYSTOLIC BLOOD PRESSURE: 116 MMHG | RESPIRATION RATE: 16 BRPM | HEIGHT: 65 IN | TEMPERATURE: 98.1 F | DIASTOLIC BLOOD PRESSURE: 84 MMHG | BODY MASS INDEX: 34.16 KG/M2

## 2018-11-29 DIAGNOSIS — G89.29 CHRONIC ABDOMINAL PAIN: Primary | ICD-10-CM

## 2018-11-29 DIAGNOSIS — R10.9 CHRONIC ABDOMINAL PAIN: Primary | ICD-10-CM

## 2018-11-29 DIAGNOSIS — G44.209 TENSION HEADACHE: ICD-10-CM

## 2018-11-29 DIAGNOSIS — Z72.0 TOBACCO USE: ICD-10-CM

## 2018-11-29 DIAGNOSIS — F33.0 MILD EPISODE OF RECURRENT MAJOR DEPRESSIVE DISORDER (HCC): ICD-10-CM

## 2018-11-29 DIAGNOSIS — I10 ESSENTIAL HYPERTENSION: ICD-10-CM

## 2018-11-29 DIAGNOSIS — K59.09 CHRONIC CONSTIPATION: ICD-10-CM

## 2018-11-29 RX ORDER — AMITRIPTYLINE HYDROCHLORIDE 25 MG/1
25 TABLET, FILM COATED ORAL
Qty: 30 TAB | Refills: 0 | Status: SHIPPED | OUTPATIENT
Start: 2018-11-29 | End: 2019-02-12 | Stop reason: SDUPTHER

## 2018-11-29 NOTE — PATIENT INSTRUCTIONS
Tension Headache: Care Instructions Your Care Instructions Most headaches are tension headaches. These headaches tend to happen again, especially if you are under stress. A tension headache may cause pain or a feeling of pressure all over your head. You probably can't pinpoint the center of the pain. If you keep getting tension headaches, the best thing you can do to limit them is to find out what is causing them and then make changes in those areas. Follow-up care is a key part of your treatment and safety. Be sure to make and go to all appointments, and call your doctor if you are having problems. It's also a good idea to know your test results and keep a list of the medicines you take. How can you care for yourself at home? · Rest in a quiet, dark room with a cool cloth on your forehead until your headache is gone. Close your eyes, and try to relax or go to sleep. Don't watch TV or read. Avoid using the computer. · Use a warm, moist towel or a heating pad set on low to relax tight shoulder and neck muscles. · Have someone gently massage your neck and shoulders. · Take pain medicines exactly as directed. ? If the doctor gave you a prescription medicine for pain, take it as prescribed. ? If you are not taking a prescription pain medicine, ask your doctor if you can take an over-the-counter medicine. · Be careful not to take pain medicine more often than the instructions allow, because you may get worse or more frequent headaches when the medicine wears off. · If you get another tension headache, stop what you are doing and sit quietly for a moment. Close your eyes and breathe slowly. Try to relax your head and neck muscles. · Do not ignore new symptoms that occur with a headache, such as fever, weakness or numbness, vision changes, or confusion. These may be signs of a more serious problem. To help prevent headaches · Keep a headache diary so you can figure out what triggers your headaches. Avoiding triggers may help you prevent headaches. Record when each headache began, how long it lasted, and what the pain was like (throbbing, aching, stabbing, or dull). List anything that may have triggered the headache, such as being physically or emotionally stressed or being anxious or depressed. Other possible triggers are hunger, anger, fatigue, poor posture, and muscle strain. · Find healthy ways to deal with stress. Headaches are most common during or right after stressful times. Take time to relax before and after you do something that has caused a headache in the past. 
· Exercise daily to relieve stress. Relaxation exercises may help reduce tension. · Get plenty of sleep. · Eat regularly and well. Long periods without food can trigger a headache. · Treat yourself to a massage. Some people find that massages are very helpful in relieving tension. · Try to keep your muscles relaxed by keeping good posture. Check your jaw, face, neck, and shoulder muscles for tension, and try to relax them. When sitting at a desk, change positions often, and stretch for 30 seconds each hour. · Reduce eyestrain from computers by blinking frequently and looking away from the computer screen every so often. Make sure you have proper eyewear and that your monitor is set up properly, about an arm's length away. When should you call for help? Call 911 anytime you think you may need emergency care. For example, call if: 
  · You have signs of a stroke. These may include: 
? Sudden numbness, paralysis, or weakness in your face, arm, or leg, especially on only one side of your body. ? Sudden vision changes. ? Sudden trouble speaking. ? Sudden confusion or trouble understanding simple statements. ? Sudden problems with walking or balance. ? A sudden, severe headache that is different from past headaches.  
 Call your doctor now or seek immediate medical care if: 
  · You have new or worse nausea and vomiting.   · You have a new or higher fever.  
  · Your headache gets much worse.  
 Watch closely for changes in your health, and be sure to contact your doctor if: 
  · You are not getting better after 2 days (48 hours). Where can you learn more? Go to http://sunita-kali.info/. Enter 84 17 53 in the search box to learn more about \"Tension Headache: Care Instructions. \" Current as of: June 4, 2018 Content Version: 11.8 © 1062-4809 [employer]. Care instructions adapted under license by [employer] (which disclaims liability or warranty for this information). If you have questions about a medical condition or this instruction, always ask your healthcare professional. [address] any warranty or liability for your use of this information.

## 2018-11-29 NOTE — PROGRESS NOTES
Iraida Mancuso, 43 y.o.,  female SUBJECTIVE Ff-up Depression-  Reports some improvement on celexa but with increasing fatigue with trazodone started on last visit. she continues to have headaches and abdominal pain, similar to previous (Previous GI consult done/CT negative). She denies harmful ideation. She was referred to a psychiatrist on last visit, has yet to schedule. Has tried effexor, wellbutrin in the past. No urinary symptoms, no pelvic symptoms. She has history of constipation, and reports taking laxatives daily. Reviewed labs-wnl HTN-continues to take norvasc, smokes socially. ROS: 
See HPI, all others negative Patient Active Problem List  
Diagnosis Code  Tobacco use Z72.0  Tension headache G44.209  Depression F32.9  
 Essential hypertension I10  Chronic constipation K59.09  
 Abnormal uterine bleeding (AUB) N93.9  Secondary dysmenorrhea N94.5  Severe obesity (BMI 35.0-39. 9) with comorbidity (HCC) E66.01  
 Mild episode of recurrent major depressive disorder ([facility] [address] F33.0  Severe obesity (BMI 35.0-39. 9) E66.01  
 
 
Current Outpatient Medications Medication Sig Dispense Refill  amitriptyline (ELAVIL) 25 mg tablet Take 1 Tab by mouth nightly. 30 Tab 0  
 citalopram (CELEXA) 20 mg tablet Take 1 Tab by mouth daily. 45 Tab 0  
 amLODIPine (NORVASC) 5 mg tablet Take 1 Tab by mouth daily. 90 Tab 0  
 acetaminophen (TYLENOL EXTRA STRENGTH) 500 mg tablet Take  by mouth as needed for Pain (headaches & pelvic pain).  metronidazole (FLAGYL PO) Take  by mouth. Allergies Allergen Reactions  Carrot Swelling Mouth and gum swelling  Pcn [Penicillins] Other (comments) Patient states not allergic but gets candidiasis from it.  Strawberry Hives and Swelling Past Medical History:  
Diagnosis Date  Anxiety and depression  Bruises easily  Cervical high risk human papillomavirus (HPV) DNA test positive 03/2017 rpt pap smear 3/2018  Chronic constipation   
 dr. Ingrid chandra  Contact dermatitis and other eczema, due to unspecified cause  Depression 12/2/2015  Essential hypertension 12/30/2015  GERD (gastroesophageal reflux disease) 9/13/2013  Gynecologic exam normal   
 [place]  Headache(784.0)  Heavy menses  Hx of migraines  Irregular menses  Ovarian cyst   
 Pelvic pain Social History Socioeconomic History  Marital status: SINGLE Spouse name: Not on file  Number of children: Not on file  Years of education: Not on file  Highest education level: Not on file Social Needs  Financial resource strain: Not on file  Food insecurity - worry: Not on file  Food insecurity - inability: Not on file  Transportation needs - medical: Not on file  Transportation needs - non-medical: Not on file Occupational History  Not on file Tobacco Use  Smoking status: Current Every Day Smoker Packs/day: 0.08 Years: 10.00 Pack years: 0.80  Smokeless tobacco: Never Used  Tobacco comment: smokes cigars - 1.5 a day Substance and Sexual Activity  Alcohol use: Yes Comment: occasionally  Drug use: No  
 Sexual activity: Not on file Other Topics Concern  Not on file Social History Narrative  Not on file Family History Problem Relation Age of Onset  Diabetes Mother  Hypertension Mother  Kidney Disease Mother   
     stones  Hypertension Father  Diabetes Sister  Hypertension Sister  Stroke Sister OBJECTIVE Physical Exam:  
 
Visit Vitals /84 (BP 1 Location: Left arm, BP Patient Position: Sitting) Pulse 63 Temp 98.1 °F (36.7 °C) (Oral) Resp 16 Ht 5' 5\" (1.651 m) Wt 205 lb (93 kg) SpO2 99% BMI 34.11 kg/m² General: alert, well-appearing, obese,AA, in no apparent distress or pain CVS: normal rate, regular rhythm, distinct S1 and S2 
 Lungs:clear to ausculation bilaterally, no crackles, wheezing or rhonchi noted Abdomen: normoactive bowel sounds, soft, non-tender, neg [place] Extremities: no edema, no cyanosis, Psych:  mood and affect normal 
 
Results for orders placed or performed during the hospital encounter of 04/03/18 METABOLIC PANEL, BASIC Result Value Ref Range Sodium 140 136 - 145 mEq/L Potassium 4.2 3.5 - 5.1 mEq/L Chloride 108 (H) 98 - 107 mEq/L  
 CO2 27 21 - 32 mEq/L Glucose 73 (L) 74 - 106 mg/dl BUN 11 7 - 25 mg/dl Creatinine 0.5 (L) 0.6 - 1.3 mg/dl GFR est AA >60.0 GFR est non-AA >60 Calcium 9.0 8.5 - 10.1 mg/dl BETA HCG, QT Result Value Ref Range HCG, beta, QT <1 mIU/ml CBC WITH AUTOMATED DIFF Result Value Ref Range WBC 9.0 4.0 - 11.0 1000/mm3 RBC 5.07 3.60 - 5.20 M/uL  
 HGB 13.1 13.0 - 17.2 gm/dl HCT 40.3 37.0 - 50.0 % MCV 79.5 (L) 80.0 - 98.0 fL  
 MCH 25.8 25.4 - 34.6 pg  
 MCHC 32.5 30.0 - 36.0 gm/dl PLATELET 246 066 - 129 1000/mm3 MPV 11.5 (H) 6.0 - 10.0 fL  
 RDW-SD 46.3 36.4 - 46.3 NRBC 0 0 - 0 IMMATURE GRANULOCYTES 0.2 0.0 - 3.0 % NEUTROPHILS 60.7 34 - 64 % LYMPHOCYTES 30.5 28 - 48 % MONOCYTES 5.6 1 - 13 % EOSINOPHILS 2.8 0 - 5 % BASOPHILS 0.2 0 - 3 % EKG, 12 LEAD, INITIAL Result Value Ref Range Ventricular Rate 57 BPM  
 Atrial Rate 57 BPM  
 P-R Interval 174 ms QRS Duration 88 ms Q-T Interval 426 ms  
 QTC Calculation (Bezet) 414 ms Calculated P Axis 61 degrees Calculated R Axis 67 degrees Calculated T Axis 65 degrees Diagnosis Sinus bradycardia Biatrial enlargement Septal infarct , age undetermined Abnormal ECG When compared with ECG of 05-APR-2017 12:23, 
Septal infarct is now present Confirmed by Aníbal Gusman M.D., Silas Trevizo ([id number]) on 4/3/2018 6:52:31 PM 
Also confirmed by Matt Cross M.D., Bridgett Man ([id number])  on 4/3/2018 6:55:17 PM 
  
 
 
 
 
ASSESSMENT/PLAN Diagnoses and all orders for this visit: 
 
 Moderate episode of recurrent major depressive disorder (HCC) 
-cont celexa, switch trazodone to elavil, hopefully improve headaches Advised to pursue psychiatry consult Sleeping difficulty Generalized abdominal pain Advised high fiber diet, cont miralax/bisacodyl Consider linzess Monitoring Tension headache Trial elavil BMI 34 Encourage wt loss Follow-up Disposition: 
Return in about 4 weeks (around 12/27/2018), or if symptoms worsen or fail to improve. Patient understands plan of care. Patient has provided input and agrees with goals.

## 2018-11-29 NOTE — PROGRESS NOTES
1. Have you been to the ER, urgent care clinic since your last visit? Hospitalized since your last visit? No 
 
2. Have you seen or consulted any other health care providers outside of the [address] [place] [address] since your last visit? Include any pap smears or colon screening.  No

## 2019-02-12 ENCOUNTER — OFFICE VISIT (OUTPATIENT)
Dept: FAMILY MEDICINE CLINIC | Age: 39
End: 2019-02-12

## 2019-02-12 VITALS
HEIGHT: 65 IN | OXYGEN SATURATION: 95 % | DIASTOLIC BLOOD PRESSURE: 84 MMHG | BODY MASS INDEX: 36.12 KG/M2 | HEART RATE: 78 BPM | WEIGHT: 216.8 LBS | RESPIRATION RATE: 17 BRPM | SYSTOLIC BLOOD PRESSURE: 132 MMHG | TEMPERATURE: 98.9 F

## 2019-02-12 DIAGNOSIS — I10 ESSENTIAL HYPERTENSION: ICD-10-CM

## 2019-02-12 DIAGNOSIS — G43.109 MIGRAINE WITH AURA AND WITHOUT STATUS MIGRAINOSUS, NOT INTRACTABLE: Primary | ICD-10-CM

## 2019-02-12 DIAGNOSIS — F33.0 MILD EPISODE OF RECURRENT MAJOR DEPRESSIVE DISORDER (HCC): ICD-10-CM

## 2019-02-12 RX ORDER — AMITRIPTYLINE HYDROCHLORIDE 25 MG/1
25 TABLET, FILM COATED ORAL
Qty: 30 TAB | Refills: 0 | Status: SHIPPED | OUTPATIENT
Start: 2019-02-12 | End: 2019-03-01 | Stop reason: SDUPTHER

## 2019-02-12 RX ORDER — AMLODIPINE BESYLATE 5 MG/1
5 TABLET ORAL DAILY
Qty: 90 TAB | Refills: 0 | Status: SHIPPED | OUTPATIENT
Start: 2019-02-12 | End: 2019-05-16 | Stop reason: SDUPTHER

## 2019-02-12 RX ORDER — BUTALBITAL, ACETAMINOPHEN AND CAFFEINE 300; 40; 50 MG/1; MG/1; MG/1
CAPSULE ORAL
COMMUNITY
Start: 2019-02-10 | End: 2022-03-31 | Stop reason: SDUPTHER

## 2019-02-12 RX ORDER — KETOROLAC TROMETHAMINE 30 MG/ML
60 INJECTION, SOLUTION INTRAMUSCULAR; INTRAVENOUS ONCE
Qty: 1 VIAL | Refills: 0
Start: 2019-02-12 | End: 2019-02-12

## 2019-02-12 RX ORDER — SUMATRIPTAN 25 MG/1
25 TABLET, FILM COATED ORAL
Qty: 12 TAB | Refills: 0 | Status: SHIPPED | OUTPATIENT
Start: 2019-02-12 | End: 2019-02-12

## 2019-02-12 RX ORDER — DULOXETIN HYDROCHLORIDE 30 MG/1
30 CAPSULE, DELAYED RELEASE ORAL DAILY
Qty: 90 CAP | Refills: 0 | Status: SHIPPED | OUTPATIENT
Start: 2019-02-12 | End: 2019-05-16 | Stop reason: SDUPTHER

## 2019-02-12 NOTE — PROGRESS NOTES
Arturo Plata, 43 y.o.,  female    SUBJECTIVE  Ff-up ED    Pt reports L sided intense  throbbing, headache that work her up from sleep. No light or noise sensitivity, she does report aura. She has had similar milder headaches in the past.  Reports trial fioricet is too sedating for her. ED note reviewed. CT without acute findings. She is not aware of trigger    Depression/anxiety- reports urinary incontinence on celexa and decided to discontinue. Reports previously on zoloft with gaining tolerance to this medication. Has not tried anything else. Prescribed elavil instead of trazodone for sleep on last visit and did not  or try. HTN-taking norvasc, requesting refill    ROS:  See HPI, all others negative        Patient Active Problem List   Diagnosis Code    Tobacco use Z72.0    Tension headache G44.209    Depression F32.9    Essential hypertension I10    Chronic constipation K59.09    Abnormal uterine bleeding (AUB) N93.9    Secondary dysmenorrhea N94.5    Severe obesity (BMI 35.0-39. 9) with comorbidity (HCC) E66.01    Mild episode of recurrent major depressive disorder ([address] F33.0    Severe obesity (BMI 35.0-39. 9) E66.01       Current Outpatient Medications   Medication Sig Dispense Refill    butalbital-acetaminophen-caff (FIORICET) -40 mg per capsule Take 1 Cap by Mouth Every 4 Hours.  amitriptyline (ELAVIL) 25 mg tablet Take 1 Tab by mouth nightly. 30 Tab 0    SUMAtriptan (IMITREX) 25 mg tablet Take 1 Tab by mouth once as needed for Migraine for up to 1 dose. 12 Tab 0    DULoxetine (CYMBALTA) 30 mg capsule Take 1 Cap by mouth daily. 90 Cap 0    ketorolac tromethamine (TORADOL) 60 mg/2 mL soln 2 mL by IntraMUSCular route once for 1 dose. 1 Vial 0    amLODIPine (NORVASC) 5 mg tablet Take 1 Tab by mouth daily.  90 Tab 0       Allergies   Allergen Reactions    Carrot Swelling     Mouth and gum swelling      Pcn [Penicillins] Other (comments)     Patient states not allergic but gets candidiasis from it.      Strawberry Hives and Swelling       Past Medical History:   Diagnosis Date    Anxiety and depression     Bruises easily     Cervical high risk human papillomavirus (HPV) DNA test positive 03/2017    rpt pap smear 3/2018    Chronic constipation     dr. Miriam chandra    Contact dermatitis and other eczema, due to unspecified cause     Depression 12/2/2015    Essential hypertension 12/30/2015    GERD (gastroesophageal reflux disease) 9/13/2013    Gynecologic exam normal     Colwill    Headache(784.0)     Heavy menses     Hx of migraines     Irregular menses     Ovarian cyst     Pelvic pain        Social History     Socioeconomic History    Marital status: SINGLE     Spouse name: Not on file    Number of children: Not on file    Years of education: Not on file    Highest education level: Not on file   Social Needs    Financial resource strain: Not on file    Food insecurity - worry: Not on file    Food insecurity - inability: Not on file   [employer] needs - medical: Not on file   [employer] needs - non-medical: Not on file   Occupational History    Not on file   Tobacco Use    Smoking status: Current Every Day Smoker     Packs/day: 0.08     Years: 10.00     Pack years: 0.80    Smokeless tobacco: Never Used    Tobacco comment: smokes cigars - 1.5 a day   Substance and Sexual Activity    Alcohol use: Yes     Comment: occasionally    Drug use: No    Sexual activity: Not on file   Other Topics Concern    Not on file   Social History Narrative    Not on file       Family History   Problem Relation Age of Onset    Diabetes Mother     Hypertension Mother     Kidney Disease Mother         stones    Hypertension Father     Diabetes Sister     Hypertension Sister     Stroke Sister          OBJECTIVE    Physical Exam:     Visit Vitals  /84 (BP 1 Location: Left arm, BP Patient Position: Sitting)   Pulse 78   Temp 98.9 °F (37.2 °C) (Oral) Resp 17   Ht 5' 5\" (1.651 m)   Wt 216 lb 12.8 oz (98.3 kg)   SpO2 95%   BMI 36.08 kg/m²       General: alert, AA, in some discomfort  Neck: supple, no adenopathy palpated  CVS: normal rate, regular rhythm, distinct S1 and S2  Lungs:clear to ausculation bilaterally, no crackles, wheezing or rhonchi noted  Extremities: no edema, no cyanosis, MSK grossly normal  Skin: warm, no lesions, rashes noted  Psych:  mood and affect normal        ASSESSMENT/PLAN  Diagnoses and all orders for this visit:    1. Migraine with aura and without status migrainosus, not intractable  -     REFERRAL TO NEUROLOGY  Start for acute episodes-     SUMAtriptan (IMITREX) 25 mg tablet; Take 1 Tab by mouth once as needed for Migraine for up to 1 dose. Given today-     KETOROLAC TROMETHAMINE INJ  -     DE THER/PROPH/DIAG INJECTION, SUBCUT/IM  Hopefully cymbalta and elavil will help address ha as well    2. Essential hypertension  -     amLODIPine (NORVASC) 5 mg tablet; Take 1 Tab by mouth daily. 3. Sleep difficulty  Start:  -     amitriptyline (ELAVIL) 25 mg tablet; Take 1 Tab by mouth nightly. 4. Recurrent depression  Refractory to celexa/zoloft  Start:-     DULoxetine (CYMBALTA) 30 mg capsule; Take 1 Cap by mouth daily.  -     Follow-up Disposition:  Return in about 4 weeks (around 3/12/2019), or if symptoms worsen or fail to improve. Patient understands plan of care. Patient has provided input and agrees with goals.

## 2019-02-12 NOTE — LETTER
NOTIFICATION RETURN TO WORK / SCHOOL 
 
2/12/2019 5:51 PM 
 
Ms. Guillermina Logan [address] [facility] [address] [address] To Whom It May Concern: 
 
Guillermina Logan is currently under the care of [address] [facility]. She will return to work/school on: 2/14/2019 If there are questions or concerns please have the patient contact our office.  
 
 
 
Sincerely, 
 
 
Sonido Carbajal MD

## 2019-02-12 NOTE — PATIENT INSTRUCTIONS
Migraine Headache: Care Instructions  Your Care Instructions  Migraines are painful, throbbing headaches that often start on one side of the head. They may cause nausea and vomiting and make you sensitive to light, sound, or smell. Without treatment, migraines can last from 4 hours to a few days. Medicines can help prevent migraines or stop them after they have started. Your doctor can help you find which ones work best for you. Follow-up care is a key part of your treatment and safety. Be sure to make and go to all appointments, and call your doctor if you are having problems. It's also a good idea to know your test results and keep a list of the medicines you take. How can you care for yourself at home? · Do not drive if you have taken a prescription pain medicine. · Rest in a quiet, dark room until your headache is gone. Close your eyes, and try to relax or go to sleep. Don't watch TV or read. · Put a cold, moist cloth or cold pack on the painful area for 10 to 20 minutes at a time. Put a thin cloth between the cold pack and your skin. · Use a warm, moist towel or a heating pad set on low to relax tight shoulder and neck muscles. · Have someone gently massage your neck and shoulders. · Take your medicines exactly as prescribed. Call your doctor if you think you are having a problem with your medicine. You will get more details on the specific medicines your doctor prescribes. · Be careful not to take pain medicine more often than the instructions allow. You could get worse or more frequent headaches when the medicine wears off. To prevent migraines  · Keep a headache diary so you can figure out what triggers your headaches. Avoiding triggers may help you prevent headaches. Record when each headache began, how long it lasted, and what the pain was like.  (Was it throbbing, aching, stabbing, or dull?) Write down any other symptoms you had with the headache, such as nausea, flashing lights or dark spots, or sensitivity to bright light or loud noise. Note if the headache occurred near your period. List anything that might have triggered the headache. Triggers may include certain foods (chocolate, cheese, wine) or odors, smoke, bright light, stress, or lack of sleep. · If your doctor has prescribed medicine for your migraines, take it as directed. You may have medicine that you take only when you get a migraine and medicine that you take all the time to help prevent migraines. ? If your doctor has prescribed medicine for when you get a headache, take it at the first sign of a migraine, unless your doctor has given you other instructions. ? If your doctor has prescribed medicine to prevent migraines, take it exactly as prescribed. Call your doctor if you think you are having a problem with your medicine. · Find healthy ways to deal with stress. Migraines are most common during or right after stressful times. Take time to relax before and after you do something that has caused a migraine in the past.  · Try to keep your muscles relaxed by keeping good posture. Check your jaw, face, neck, and shoulder muscles for tension. Try to relax them. When you sit at a desk, change positions often. And make sure to stretch for 30 seconds each hour. · Get plenty of sleep and exercise. · Eat meals on a regular schedule. Avoid foods and drinks that often trigger migraines. These include chocolate, alcohol (especially red wine and port), aspartame, monosodium glutamate (MSG), and some additives found in foods (such as hot dogs, wilson, cold cuts, aged cheeses, and pickled foods). · Limit caffeine. Don't drink too much coffee, tea, or soda. But don't quit caffeine suddenly. That can also give you migraines. · Do not smoke or allow others to smoke around you. If you need help quitting, talk to your doctor about stop-smoking programs and medicines. These can increase your chances of quitting for good.   · If you are taking birth control pills or hormone therapy, talk to your doctor about whether they are triggering your migraines. When should you call for help? Call 911 anytime you think you may need emergency care. For example, call if:    · You have signs of a stroke. These may include:  ? Sudden numbness, paralysis, or weakness in your face, arm, or leg, especially on only one side of your body. ? Sudden vision changes. ? Sudden trouble speaking. ? Sudden confusion or trouble understanding simple statements. ? Sudden problems with walking or balance. ? A sudden, severe headache that is different from past headaches.    Call your doctor now or seek immediate medical care if:    · You have new or worse nausea and vomiting.     · You have a new or higher fever.     · Your headache gets much worse.    Watch closely for changes in your health, and be sure to contact your doctor if:    · You are not getting better after 2 days (48 hours). Where can you learn more? Go to http://sunita-kali.info/. Enter Q541 in the search box to learn more about \"Migraine Headache: Care Instructions. \"  Current as of: Melissa 3, 2018  Content Version: 11.9  © 2255-7553 "[employer]. Care instructions adapted under license by [employer] (which disclaims liability or warranty for this information). If you have questions about a medical condition or this instruction, always ask your healthcare professional. [address] any warranty or liability for your use of this information.

## 2019-02-12 NOTE — PROGRESS NOTES
1. Have you been to the ER, urgent care clinic since your last visit? Hospitalized since your last visit? No    2. Have you seen or consulted any other health care providers outside of the [address] [place] [address] since your last visit? Include any pap smears or colon screening. No     Chief Complaint   Patient presents with   [facility] Follow Up     seen 2/10/19, [facility], migraine    Visual Problems     blurry vision LT eye, times 2 days    Headache     times 2 week    Results     Toradol 60 mg given IM in right gluteus. Lot # [id number], exp date 11/01/2019. Patient tolerated injection well. No adverse reaction noted.   FFZ#[id number]  SAQIB

## 2019-02-13 ENCOUNTER — TELEPHONE (OUTPATIENT)
Dept: FAMILY MEDICINE CLINIC | Age: 39
End: 2019-02-13

## 2019-03-01 ENCOUNTER — OFFICE VISIT (OUTPATIENT)
Dept: NEUROLOGY | Age: 39
End: 2019-03-01

## 2019-03-01 VITALS
HEART RATE: 73 BPM | DIASTOLIC BLOOD PRESSURE: 80 MMHG | TEMPERATURE: 99 F | RESPIRATION RATE: 18 BRPM | SYSTOLIC BLOOD PRESSURE: 140 MMHG | OXYGEN SATURATION: 98 % | BODY MASS INDEX: 35.32 KG/M2 | HEIGHT: 65 IN | WEIGHT: 212 LBS

## 2019-03-01 DIAGNOSIS — I10 UNCONTROLLED HYPERTENSION: ICD-10-CM

## 2019-03-01 DIAGNOSIS — G43.111 INTRACTABLE MIGRAINE WITH AURA WITH STATUS MIGRAINOSUS: ICD-10-CM

## 2019-03-01 DIAGNOSIS — R51.9 MORNING HEADACHE: Primary | ICD-10-CM

## 2019-03-01 DIAGNOSIS — G44.40 ANALGESIC OVERUSE HEADACHE: ICD-10-CM

## 2019-03-01 DIAGNOSIS — T39.95XA ANALGESIC OVERUSE HEADACHE: ICD-10-CM

## 2019-03-01 DIAGNOSIS — R40.0 DAYTIME SOMNOLENCE: ICD-10-CM

## 2019-03-01 RX ORDER — SUMATRIPTAN 100 MG/1
TABLET, FILM COATED ORAL
Qty: 12 TAB | Refills: 3 | Status: SHIPPED | OUTPATIENT
Start: 2019-03-01 | End: 2019-03-15 | Stop reason: SDUPTHER

## 2019-03-01 RX ORDER — SUMATRIPTAN 25 MG/1
25 TABLET, FILM COATED ORAL
COMMUNITY
End: 2019-03-01

## 2019-03-01 RX ORDER — AMITRIPTYLINE HYDROCHLORIDE 25 MG/1
25 TABLET, FILM COATED ORAL
Qty: 30 TAB | Refills: 3 | Status: SHIPPED | OUTPATIENT
Start: 2019-03-01 | End: 2019-03-15 | Stop reason: DRUGHIGH

## 2019-03-01 NOTE — PATIENT INSTRUCTIONS
Thank you for choosing [facility] and [facility] for your  
 
care. You may receive a survey about your visit. We appreciate you taking time  
 
to complete this survey as we use your feedback to improve our services. We  
 
realize we are not perfect, but we strive to provide excellent care.

## 2019-03-01 NOTE — PROGRESS NOTES
[employer] [address] [facility], [address] [place] [address] Camilla Zacarias, [address] [facility], [place], [address] Office:  [phone]  Fax: [phone] Referring: Ismael Andres MD 
 
Chief Complaint Patient presents with  Migraine HPI: This is a 13-year-old female who presents for new patient evaluation with chief complaint of headaches. Pertinent medical history of hypertension, GERD, and depression. Endorses long history of headaches. Headaches started when she was 11. Headaches located generlaized. She endorses left sided headache surrounding the left eye. Associated with blurred vision to the left eye. Sharp headpain. This woke her out of sleep. Can have morning headaches. Waking up several times a day. Endorses daytime somnolence and occasionally snores. She can easily fall asleep if she is sitting down after lunch. Headache associated with light sensitivity. Denies sensitivity to sound. Positive for nausea. Denies vomiting. Endorses weakness. Positive visual disturbance. Denies loss of vision. Can feel weak but still able to use the limb. Denies dragging an arm or leg. Denies trouble walking or falling. She recently was seen at [facility] emergency room on February 10th for headache. She had a negative head CT at this time. She was given a \"headache cocktail\" and discharged home. Positive family history of migraines. Positive tobacco use smoking for black in miles a day. For headache she will take over-the-counter Tylenol. She was recently prescribed amitriptyline by her primary care provider. For history of depression previously was on Celexa and Zoloft without improvement. She now is on a combination of amitriptyline and duloxetine. She was also given sumatriptan. She is a . She tries to exercise. Social History Socioeconomic History  Marital status: SINGLE Spouse name: Not on file  Number of children: Not on file  Years of education: Not on file  Highest education level: Not on file Social Needs  Financial resource strain: Not on file  Food insecurity - worry: Not on file  Food insecurity - inability: Not on file  Transportation needs - medical: Not on file  Transportation needs - non-medical: Not on file Occupational History  Not on file Tobacco Use  Smoking status: Current Every Day Smoker Packs/day: 0.08 Years: 10.00 Pack years: 0.80  Smokeless tobacco: Never Used  Tobacco comment: smokes cigars - 1.5 a day Substance and Sexual Activity  Alcohol use: Yes Comment: occasionally  Drug use: No  
 Sexual activity: Not on file Other Topics Concern  Not on file Social History Narrative  Not on file Family History Problem Relation Age of Onset  Diabetes Mother  Hypertension Mother  Kidney Disease Mother   
     stones  Hypertension Father  Diabetes Sister  Hypertension Sister  Stroke Sister Current Outpatient Medications Medication Sig Dispense Refill  amitriptyline (ELAVIL) 25 mg tablet Take 1 Tab by mouth nightly. 30 Tab 3  
 SUMAtriptan (IMITREX) 100 mg tablet Take one tab at HA onset, may repeat once after 2 hours. Max two tabs in 24 hours 12 Tab 3  
 DULoxetine (CYMBALTA) 30 mg capsule Take 1 Cap by mouth daily. 90 Cap 0  
 amLODIPine (NORVASC) 5 mg tablet Take 1 Tab by mouth daily. 90 Tab 0  
 butalbital-acetaminophen-caff (FIORICET) -40 mg per capsule Take 1 Cap by Mouth Every 4 Hours. Past Medical History:  
Diagnosis Date  Anxiety and depression  Bruises easily  Cervical high risk human papillomavirus (HPV) DNA test positive 03/2017  
 rpt pap smear 3/2018  Chronic dr. Indigo felix Reas  Contact dermatitis and other eczema, due to unspecified cause  Depression 12/2/2015  Essential hypertension 12/30/2015  GERD (gastroesophageal reflux disease) 9/13/2013  Gynecologic exam normal   
 [place]will  Headache(784.0)  Heavy menses  Hx of migraines  Irregular menses  Ovarian cyst   
 Pelvic pain Past Surgical History:  
Procedure Laterality Date  HX APPENDECTOMY  HX TUBAL LIGATION Allergies Allergen Reactions  Carrot Swelling Mouth and gum swelling  Pcn [Penicillins] Other (comments) Patient states not allergic but gets candidiasis from it.  Strawberry Hives and Swelling Patient Active Problem List  
Diagnosis Code  Tobacco use Z72.0  Tension headache G44.209  Depression F32.9  
 Essential hypertension I10  Chronic constipation K59.09  
 Abnormal uterine bleeding (AUB) N93.9  Secondary dysmenorrhea N94.5  Severe obesity (BMI 35.0-39. 9) with comorbidity (HCC) E66.01  
 Mild episode of recurrent major depressive disorder ([facility] [address] F33.0  Severe obesity (BMI 35.0-39. 9) E66.01 Review of Systems:  
Constitutional: no fever or chills Skin denies rash or itching HEENT: Positive visual disturbance. Denies tinnitus, hearing loss Respiratory: denies shortness of breath Cardiovascular: denies chest pain, dyspnea on exertion Gastrointestinal: Positive nausea. Denies vomiting Genitourinary: does not report dysuria or incontinence Musculoskeletal: does not report joint pain or swelling Endocrine: denies weight change Hematology: denies easy bruising or bleeding Neurological: as above in HPI PHYSICAL EXAMINATION:   
 
VITAL SIGNS:   
Visit Vitals /80 (BP 1 Location: Right arm, BP Patient Position: Sitting) Pulse 73 Temp 99 °F (37.2 °C) Resp 18 Ht 5' 5\" (1.651 m) Wt 96.2 kg (212 lb) SpO2 98% BMI 35.28 kg/m² GENERAL: Well developed, obese, and in no apparent distress. HEART: RR, no murmurs heard, no carotid bruits LUNGS:                      CTAB EXTREMITIES: No clubbing, cyanosis, or edema is identified. Pulses 2+ and symmetrical. 
HEAD:   Normocephalic, atraumatic. NEUROLOGIC EXAMINATION 
 
MENTAL STATUS: Awake, alert, and oriented x 4. Attention and STM are grossly normal. There is no aphasia. Fund of knowledge is adequate. Mood and affect are appropriate CRANIAL NERVES: Visual fields are full to confrontation. No fundus anomalies observed. Pupils are reactive to light and accommodation. Extraocular movements are intact and there is no nystagmus. Facial sensation is normal 
Face is symmetrical.  
Hearing is grossly intact. SCM/TPZ 5/5 Palate rises symmetrically. Tongue is in the midline. MOTOR:   Normal tone, bulk, and strength, 5/5 muscle strength throughout. No cogwheel rigidity or clonus present. CEREBELLAR: Finger to nose was normal.   No tremors or dysmetria SENSORY:  Normal PP, vibration, propioception. Romberg negative DTR's:   +2 throughout, toes downgoing GAIT:   Normal gait Impression/Plan Tamiko Gonsales is a 43 y.o. female whose history and physical are consistent with migraine with aura, morning headache, daytime somnolence, and analgesic overuse headache. She has risk factors including sleep disturbance, obesity, and tobacco use. Patient presents for evaluation of headaches. She recently was seen at [facility] emergency room for left-sided headache encompassing left eye with associated visual aura. She also has history of chronic headache. She can wake up in the morning with a headache. Endorses excessive daytime somnolence. She endorses some snoring. We will place referral for evaluation of sleep disorder and consideration for sleep study that may be contributing to her chronic daily headaches. We discussed that untreated obstructive sleep apnea can predispose her to uncontrolled hypertension.   Her blood pressure today is 140/80 in office today.  Review of records shows her blood pressure was 169/91 when seen in the emergency room on February 10th. Continue to monitor blood pressure and defer management to her primary care provider. She endorses tobacco use. We discussed smoking cessation. Discussed the importance of healthy diet and exercise as nonpharmacological headache management. Optimize depression management. I will defer this to her primary care provider. She was recently prescribed amitriptyline for sleep. We discussed that this is an appropriate headache preventative. Refills provided maintain at amitriptyline 25 mg nightly. Patient verbalized understanding. Sumatriptan at headache onset. Discussed medication, indication, side effects, and dosing. Patient verbalized understanding. No additional testing recommended at this time. Review of care everywhere tab from emergency room visit reveals unremarkable head CT. Continue to track headaches. Follow-up in 3 months. All questions addressed and patient is agreeable with plan of care. Diagnoses and all orders for this visit: 
 
1. Morning headache 
-     REFERRAL TO NEUROLOGY 2. Daytime somnolence 
-     REFERRAL TO NEUROLOGY 3. Intractable migraine with aura with status migrainosus 
-     REFERRAL TO NEUROLOGY 4. Analgesic overuse headache 5. Uncontrolled hypertension Other orders 
-     amitriptyline (ELAVIL) 25 mg tablet; Take 1 Tab by mouth nightly. -     SUMAtriptan (IMITREX) 100 mg tablet; Take one tab at HA onset, may repeat once after 2 hours. Max two tabs in 24 hours I spent 60 minutes with the patient in face-to-face consultation, with 47 minutes spent in counseling and coordination of care as described above. This note will not be viewable in [address]  
 
 
 
Signed By: Armida De La Torre NP   
 
 
PLEASE NOTE:  
Portions of this document may have been produced using voice recognition software. Unrecognized errors in transcription may be present.

## 2019-03-01 NOTE — LETTER
3/1/2019 9:56 AM 
 
Ms. Lio Wagner [address] [address] To Whom It May Concern: Ms. Rome Day was seen today in our office by JOSE JUAN Contreras.  
 
 
 
 
Sincerely, 
 
 
Tiffany Chao NP

## 2019-03-15 ENCOUNTER — OFFICE VISIT (OUTPATIENT)
Dept: FAMILY MEDICINE CLINIC | Age: 39
End: 2019-03-15

## 2019-03-15 VITALS
DIASTOLIC BLOOD PRESSURE: 68 MMHG | BODY MASS INDEX: 34.99 KG/M2 | WEIGHT: 210 LBS | RESPIRATION RATE: 16 BRPM | HEART RATE: 75 BPM | SYSTOLIC BLOOD PRESSURE: 120 MMHG | HEIGHT: 65 IN | TEMPERATURE: 98 F | OXYGEN SATURATION: 98 %

## 2019-03-15 DIAGNOSIS — I10 ESSENTIAL HYPERTENSION: ICD-10-CM

## 2019-03-15 DIAGNOSIS — G43.109 MIGRAINE WITH AURA AND WITHOUT STATUS MIGRAINOSUS, NOT INTRACTABLE: ICD-10-CM

## 2019-03-15 DIAGNOSIS — R53.83 FATIGUE, UNSPECIFIED TYPE: Primary | ICD-10-CM

## 2019-03-15 DIAGNOSIS — G47.9 SLEEPING DIFFICULTY: ICD-10-CM

## 2019-03-15 DIAGNOSIS — F33.0 MILD EPISODE OF RECURRENT MAJOR DEPRESSIVE DISORDER (HCC): ICD-10-CM

## 2019-03-15 DIAGNOSIS — Z72.0 TOBACCO USE: ICD-10-CM

## 2019-03-15 RX ORDER — SUMATRIPTAN 100 MG/1
TABLET, FILM COATED ORAL
Qty: 9 TAB | Refills: 1 | Status: SHIPPED | OUTPATIENT
Start: 2019-03-15 | End: 2022-03-31 | Stop reason: SDUPTHER

## 2019-03-15 RX ORDER — AMITRIPTYLINE HYDROCHLORIDE 10 MG/1
10 TABLET, FILM COATED ORAL
Qty: 90 TAB | Refills: 0 | Status: SHIPPED | OUTPATIENT
Start: 2019-03-15 | End: 2019-05-16

## 2019-03-15 NOTE — PATIENT INSTRUCTIONS
DASH Diet: Care Instructions  Your Care Instructions    The DASH diet is an eating plan that can help lower your blood pressure. DASH stands for Dietary Approaches to Stop Hypertension. Hypertension is high blood pressure. The DASH diet focuses on eating foods that are high in calcium, potassium, and magnesium. These nutrients can lower blood pressure. The foods that are highest in these nutrients are fruits, vegetables, low-fat dairy products, nuts, seeds, and legumes. But taking calcium, potassium, and magnesium supplements instead of eating foods that are high in those nutrients does not have the same effect. The DASH diet also includes whole grains, fish, and poultry. The DASH diet is one of several lifestyle changes your doctor may recommend to lower your high blood pressure. Your doctor may also want you to decrease the amount of sodium in your diet. Lowering sodium while following the DASH diet can lower blood pressure even further than just the DASH diet alone. Follow-up care is a key part of your treatment and safety. Be sure to make and go to all appointments, and call your doctor if you are having problems. It's also a good idea to know your test results and keep a list of the medicines you take. How can you care for yourself at home? Following the DASH diet  · Eat 4 to 5 servings of fruit each day. A serving is 1 medium-sized piece of fruit, ½ cup chopped or canned fruit, 1/4 cup dried fruit, or 4 ounces (½ cup) of fruit juice. Choose fruit more often than fruit juice. · Eat 4 to 5 servings of vegetables each day. A serving is 1 cup of lettuce or raw leafy vegetables, ½ cup of chopped or cooked vegetables, or 4 ounces (½ cup) of vegetable juice. Choose vegetables more often than vegetable juice. · Get 2 to 3 servings of low-fat and fat-free dairy each day. A serving is 8 ounces of milk, 1 cup of yogurt, or 1 ½ ounces of cheese. · Eat 6 to 8 servings of grains each day.  A serving is 1 slice of bread, 1 ounce of dry cereal, or ½ cup of cooked rice, pasta, or cooked cereal. Try to choose whole-grain products as much as possible. · Limit lean meat, poultry, and fish to 2 servings each day. A serving is 3 ounces, about the size of a deck of cards. · Eat 4 to 5 servings of nuts, seeds, and legumes (cooked dried beans, lentils, and split peas) each week. A serving is 1/3 cup of nuts, 2 tablespoons of seeds, or ½ cup of cooked beans or peas. · Limit fats and oils to 2 to 3 servings each day. A serving is 1 teaspoon of vegetable oil or 2 tablespoons of salad dressing. · Limit sweets and added sugars to 5 servings or less a week. A serving is 1 tablespoon jelly or jam, ½ cup sorbet, or 1 cup of lemonade. · Eat less than 2,300 milligrams (mg) of sodium a day. If you limit your sodium to 1,500 mg a day, you can lower your blood pressure even more. Tips for success  · Start small. Do not try to make dramatic changes to your diet all at once. You might feel that you are missing out on your favorite foods and then be more likely to not follow the plan. Make small changes, and stick with them. Once those changes become habit, add a few more changes. · Try some of the following:  ? Make it a goal to eat a fruit or vegetable at every meal and at snacks. This will make it easy to get the recommended amount of fruits and vegetables each day. ? Try yogurt topped with fruit and nuts for a snack or healthy dessert. ? Add lettuce, tomato, cucumber, and onion to sandwiches. ? Combine a ready-made pizza crust with low-fat mozzarella cheese and lots of vegetable toppings. Try using tomatoes, squash, spinach, broccoli, carrots, cauliflower, and onions. ? Have a variety of cut-up vegetables with a low-fat dip as an appetizer instead of chips and dip. ? Sprinkle sunflower seeds or chopped almonds over salads. Or try adding chopped walnuts or almonds to cooked vegetables.   ? Try some vegetarian meals using beans and peas. Add garbanzo or kidney beans to salads. Make burritos and tacos with mashed pitts beans or black beans. Where can you learn more? Go to http://sunita-kali.info/. Enter O607 in the search box to learn more about \"DASH Diet: Care Instructions. \"  Current as of: July 22, 2018  Content Version: 11.9  © 6818-5456 [employer], [email or web address]. Care instructions adapted under license by [employer] (which disclaims liability or warranty for this information). If you have questions about a medical condition or this instruction, always ask your healthcare professional. [address] any warranty or liability for your use of this information.

## 2019-03-15 NOTE — PROGRESS NOTES
Paris Buckner, 43 y.o.,  female    SUBJECTIVE  Ff-up    Migraine headache- reports taking elavil at night, gets too drowsy in the am. She has been evaluated by neurology, and agrees with tca medication. And planning on sleep study. Depression/anxiety- reports some improvement in cymbalta. She does report persistent fatigue, feels exhausted throughout the day. HTN-taking norvasc    ROS:  See HPI, all others negative        Patient Active Problem List   Diagnosis Code    Tobacco use Z72.0    Tension headache G44.209    Depression F32.9    Essential hypertension I10    Chronic constipation K59.09    Abnormal uterine bleeding (AUB) N93.9    Secondary dysmenorrhea N94.5    Severe obesity (BMI 35.0-39. 9) with comorbidity (HCC) E66.01    Mild episode of recurrent major depressive disorder ([facility] [address] F33.0    Severe obesity (BMI 35.0-39. 9) E66.01       Current Outpatient Medications   Medication Sig Dispense Refill    SUMAtriptan (IMITREX) 100 mg tablet Take one tab at HA onset, may repeat once after 2 hours. Max two tabs in 24 hours 9 Tab 1    amitriptyline (ELAVIL) 10 mg tablet Take 1 Tab by mouth nightly. 90 Tab 0    DULoxetine (CYMBALTA) 30 mg capsule Take 1 Cap by mouth daily. 90 Cap 0    amLODIPine (NORVASC) 5 mg tablet Take 1 Tab by mouth daily. 90 Tab 0    butalbital-acetaminophen-caff (FIORICET) -40 mg per capsule Take 1 Cap by Mouth Every 4 Hours. Allergies   Allergen Reactions    Carrot Swelling     Mouth and gum swelling      Pcn [Penicillins] Other (comments)     Patient states not allergic but gets candidiasis from it.      Strawberry Hives and Swelling       Past Medical History:   Diagnosis Date    Anxiety and depression     Bruises easily     Cervical high risk human papillomavirus (HPV) DNA test positive 03/2017    rpt pap smear 3/2018    Chronic constipation     dr. Indigo chandra Reas    Contact dermatitis and other eczema, due to unspecified cause     Depression 12/2/2015    Essential hypertension 12/30/2015    GERD (gastroesophageal reflux disease) 9/13/2013    Gynecologic exam normal     Colwill    Headache(784.0)     Heavy menses     Hx of migraines     Irregular menses     Ovarian cyst     Pelvic pain        Social History     Socioeconomic History    Marital status: SINGLE     Spouse name: Not on file    Number of children: Not on file    Years of education: Not on file    Highest education level: Not on file   Social Needs    Financial resource strain: Not on file    Food insecurity - worry: Not on file    Food insecurity - inability: Not on file    Transportation needs - medical: Not on file   [employer] needs - non-medical: Not on file   Occupational History    Not on file   Tobacco Use    Smoking status: Current Every Day Smoker     Packs/day: 0.08     Years: 10.00     Pack years: 0.80    Smokeless tobacco: Never Used    Tobacco comment: smokes cigars - 1.5 a day   Substance and Sexual Activity    Alcohol use: Yes     Comment: occasionally    Drug use: No    Sexual activity: Not on file   Other Topics Concern    Not on file   Social History Narrative    Not on file       Family History   Problem Relation Age of Onset    Diabetes Mother     Hypertension Mother     Kidney Disease Mother         stones    Hypertension Father     Diabetes Sister     Hypertension Sister     Stroke Sister          OBJECTIVE    Physical Exam:     Visit Vitals  /68 (BP 1 Location: Left arm, BP Patient Position: Sitting)   Pulse 75   Temp 98 °F (36.7 °C) (Oral)   Resp 16   Ht 5' 5\" (1.651 m)   Wt 210 lb (95.3 kg)   LMP 03/05/2019 (Approximate)   SpO2 98%   BMI 34.95 kg/m²       General: alert, AA, in some discomfort  Neck: supple, no adenopathy palpated  CVS: normal rate, regular rhythm, distinct S1 and S2  Lungs:clear to ausculation bilaterally, no crackles, wheezing or rhonchi noted  Extremities: no edema, no cyanosis, MSK grossly normal  Skin: warm, no lesions, rashes noted  Psych:  mood and affect normal        ASSESSMENT/PLAN  Diagnoses and all orders for this visit:    1. Migraine with aura and without status migrainosus, not intractable  Reduce elavil dose to 10 mg , hopefully less sedation  Cont prn imitrex  Following neurology    2. Essential hypertension  Controlled, cont norvasc    3. Sleep difficulty  Reduce elavil dose  Trazodone ineffective    4. Recurrent depression  Some improvement   Cont cymbalta  Refractory to celexa/zoloft    5. Fatigue  Likely multifactorial  Address sleep and depression  Pursue sleep study  Check cbc/tsh  Start regular exercise regimen    6.tobacco use  Discussed cessation    Follow-up Disposition:  Return in about 4 weeks (around 4/12/2019), or if symptoms worsen or fail to improve. Patient understands plan of care. Patient has provided input and agrees with goals.

## 2019-04-25 ENCOUNTER — OFFICE VISIT (OUTPATIENT)
Dept: NEUROLOGY | Age: 39
End: 2019-04-25

## 2019-04-25 VITALS
WEIGHT: 207 LBS | RESPIRATION RATE: 16 BRPM | BODY MASS INDEX: 34.49 KG/M2 | SYSTOLIC BLOOD PRESSURE: 128 MMHG | HEIGHT: 65 IN | TEMPERATURE: 98.2 F | OXYGEN SATURATION: 98 % | HEART RATE: 80 BPM | DIASTOLIC BLOOD PRESSURE: 90 MMHG

## 2019-04-25 DIAGNOSIS — G47.8 UPPER AIRWAY RESISTANCE SYNDROME: Primary | ICD-10-CM

## 2019-04-25 DIAGNOSIS — G43.019 INTRACTABLE MIGRAINE WITHOUT AURA AND WITHOUT STATUS MIGRAINOSUS: ICD-10-CM

## 2019-04-25 DIAGNOSIS — G47.10 HYPERSOMNIA: ICD-10-CM

## 2019-04-25 DIAGNOSIS — E66.01 MORBID OBESITY (HCC): ICD-10-CM

## 2019-04-25 NOTE — PROGRESS NOTES
4/25/2019 1:31 PM    SSN: [id number]    Subjective:   44 y/o female referred by Armando Valdez NP for evaluation of FABIANA. History of morning headaches, heavy snoring, and EDS raised this suspicion. She usually is asleep by 9:45pm and she is up at around 5:30am. She feels tired in the morning. 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 Because she was not staying asleep she was on amitriptyline, but she stopped it as she was tired all day.        Social History     Socioeconomic History    Marital status: SINGLE     Spouse name: Not on file    Number of children: Not on file    Years of education: Not on file    Highest education level: Not on file   Occupational History    Not on file   Social Needs    Financial resource strain: Not on file    Food insecurity:     Worry: Not on file     Inability: Not on file    Transportation needs:     Medical: Not on file     Non-medical: Not on file   Tobacco Use    Smoking status: Current Every Day Smoker     Packs/day: 0.08     Years: 10.00     Pack years: 0.80    Smokeless tobacco: Never Used    Tobacco comment: smokes cigars - 1.5 a day   Substance and Sexual Activity    Alcohol use: Yes     Comment: occasionally    Drug use: No    Sexual activity: Not on file   Lifestyle    Physical activity:     Days per week: Not on file     Minutes per session: Not on file    Stress: Not on file   Relationships    Social connections:     Talks on phone: Not on file     Gets together: Not on file     Attends Restoration service: Not on file     Active member of club or organization: Not on file     Attends meetings of clubs or organizations: Not on file     Relationship status: Not on file    Intimate partner violence:     Fear of current or ex partner: Not on file     Emotionally abused: Not on file     Physically abused: Not on file     Forced sexual activity: Not on file Other Topics Concern    Not on file   Social History Narrative    Not on file       Family History   Problem Relation Age of Onset    Diabetes Mother     Hypertension Mother     Kidney Disease Mother         stones    Hypertension Father     Diabetes Sister     Hypertension Sister     Stroke Sister        Current Outpatient Medications   Medication Sig Dispense Refill    SUMAtriptan (IMITREX) 100 mg tablet Take one tab at HA onset, may repeat once after 2 hours. Max two tabs in 24 hours 9 Tab 1    amitriptyline (ELAVIL) 10 mg tablet Take 1 Tab by mouth nightly. 90 Tab 0    DULoxetine (CYMBALTA) 30 mg capsule Take 1 Cap by mouth daily. 90 Cap 0    amLODIPine (NORVASC) 5 mg tablet Take 1 Tab by mouth daily. 90 Tab 0    butalbital-acetaminophen-caff (FIORICET) -40 mg per capsule Take 1 Cap by Mouth Every 4 Hours. Past Medical History:   Diagnosis Date    Anxiety and depression     Bruises easily     Cervical high risk human papillomavirus (HPV) DNA test positive 03/2017    rpt pap smear 3/2018    Chronic constipation     corazon, dr. Naidu Bristlakesha    Contact dermatitis and other eczema, due to unspecified cause     Depression 12/2/2015    Essential hypertension 12/30/2015    GERD (gastroesophageal reflux disease) 9/13/2013    Gynecologic exam normal     Colwill    Headache(784.0)     Heavy menses     Hx of migraines     Irregular menses     Ovarian cyst     Pelvic pain        Past Surgical History:   Procedure Laterality Date    HX APPENDECTOMY      HX TUBAL LIGATION         Allergies   Allergen Reactions    Carrot Swelling     Mouth and gum swelling      Pcn [Penicillins] Other (comments)     Patient states not allergic but gets candidiasis from it.      Strawberry Hives and Swelling       Vital signs:    Visit Vitals  /90 (BP 1 Location: Left arm, BP Patient Position: Sitting)   Pulse 80   Temp 98.2 °F (36.8 °C) (Oral)   Resp 16   Ht 5' 5\" (1.651 m)   Wt 93.9 kg (207 lb) LMP  (LMP Unknown)   SpO2 98%   BMI 34.45 kg/m²       Review of Systems:   GENERAL: Denies fever, reports fatigue  CARDIAC: No CP or SOB  PULMONARY: No cough of SOB  MUSCULOSKELETAL: No new joint pain  NEURO: SEE HPI      EXAM: Alert, in NAD. Heart is regular. Mallampati IV, crowded oropharynx. Oriented x3, EOM's are full, PERRL, no facial asymmetries. Strength and tone are normal. DTR's +2, gait symmetric       Assessment/Plan: Very likely has FABIANA, which is causing EDS, aggravating headaches. Counseled pt at length about obstructive sleep apnea evaluation, treatment options, weight loss as appropriate, and consequences of untreated FABIANA. Will get a home sleep study and see her afterwards. PLEASE NOTE:   Portions of this document may have been produced using voice recognition software. Unrecognized errors in transcription may be present. This note will not be viewable in [address]

## 2019-04-25 NOTE — PROGRESS NOTES
ROOM # 5    Jose Eduardo Chao presents today for   Chief Complaint   Patient presents with   [facility] Establish Care    Sleep Problem       [address] preferred language for health care discussion is english/other. Depression Screening:  3 most recent PHQ Screens 10/7/2015   PHQ Not Done Medical Reason (indicate in comments)       Learning Assessment:  Learning Assessment 11/4/2015   PRIMARY LEARNER Patient   HIGHEST LEVEL OF EDUCATION - PRIMARY LEARNER  [employer]   BARRIERS PRIMARY LEARNER NONE   CO-LEARNER CAREGIVER No   PRIMARY LANGUAGE ENGLISH    NEED No   LEARNER PREFERENCE PRIMARY LISTENING     READING   LEARNING SPECIAL TOPICS No   ANSWERED BY patient   RELATIONSHIP SELF       Abuse Screening:  Abuse Screening Questionnaire 3/15/2019 11/30/2017   Do you ever feel afraid of your partner? N N   Are you in a relationship with someone who physically or mentally threatens you? N N   Is it safe for you to go home? Y Y       Fall Risk  Fall Risk Assessment, last 12 mths 11/30/2017   Able to walk? Yes   Fall in past 12 months? Yes   Fall with injury? No   Number of falls in past 12 months 1   Fall Risk Score 1       Visit Vitals  /90 (BP 1 Location: Left arm, BP Patient Position: Sitting)   Pulse 80   Temp 98.2 °F (36.8 °C) (Oral)   Resp 16   Ht 5' 5\" (1.651 m)   Wt 207 lb (93.9 kg)   SpO2 98%   BMI 34.45 kg/m²       Health Maintenance reviewed and discussed per provider. Yes    [address] is due for There are no preventive care reminders to display for this patient. Please order/place referral if appropriate. Advance Directive:  1. Do you have an advance directive in place? Patient Reply: No    2. If not, would you like material regarding how to put one in place? Patient Reply: No    Coordination of Care:  1. Have you been to the ER, urgent care clinic since your last visit? Hospitalized since your last visit?  No

## 2019-04-25 NOTE — LETTER
4/25/19 Patient: Darcie Gamino YOB: 1980 Date of Visit: 4/25/2019 Verena Hickman NP 
[address] [facility] [address] [facility] [address] VIA In Basket Dear Verena Hickman NP, Thank you for referring Ms. Anabell West to [facility] for evaluation. My notes for this consultation are attached. If you have questions, please do not hesitate to call me. I look forward to following your patient along with you.  
 
 
Sincerely, 
 
Sophia Lira MD

## 2019-05-16 ENCOUNTER — OFFICE VISIT (OUTPATIENT)
Dept: FAMILY MEDICINE CLINIC | Age: 39
End: 2019-05-16

## 2019-05-16 VITALS
TEMPERATURE: 98.1 F | OXYGEN SATURATION: 98 % | DIASTOLIC BLOOD PRESSURE: 82 MMHG | BODY MASS INDEX: 33.66 KG/M2 | WEIGHT: 202 LBS | HEART RATE: 63 BPM | RESPIRATION RATE: 14 BRPM | HEIGHT: 65 IN | SYSTOLIC BLOOD PRESSURE: 130 MMHG

## 2019-05-16 DIAGNOSIS — Z72.0 TOBACCO USE: ICD-10-CM

## 2019-05-16 DIAGNOSIS — G47.9 SLEEPING DIFFICULTY: ICD-10-CM

## 2019-05-16 DIAGNOSIS — I10 ESSENTIAL HYPERTENSION: ICD-10-CM

## 2019-05-16 DIAGNOSIS — F33.9 RECURRENT DEPRESSION (HCC): ICD-10-CM

## 2019-05-16 DIAGNOSIS — G43.109 MIGRAINE WITH AURA AND WITHOUT STATUS MIGRAINOSUS, NOT INTRACTABLE: Primary | ICD-10-CM

## 2019-05-16 DIAGNOSIS — K58.1 IRRITABLE BOWEL SYNDROME WITH CONSTIPATION: ICD-10-CM

## 2019-05-16 RX ORDER — DULOXETIN HYDROCHLORIDE 30 MG/1
30 CAPSULE, DELAYED RELEASE ORAL DAILY
Qty: 90 CAP | Refills: 0 | Status: SHIPPED | OUTPATIENT
Start: 2019-05-16 | End: 2019-09-25 | Stop reason: SDUPTHER

## 2019-05-16 RX ORDER — AMLODIPINE BESYLATE 5 MG/1
5 TABLET ORAL DAILY
Qty: 90 TAB | Refills: 2 | Status: SHIPPED | OUTPATIENT
Start: 2019-05-16 | End: 2019-09-25 | Stop reason: SDUPTHER

## 2019-05-16 NOTE — PROGRESS NOTES
1. Have you been to the ER, urgent care clinic since your last visit? Hospitalized since your last visit? Yes, [facility] in [place] 2019 for headaches     2. Have you seen or consulted any other health care providers outside of the [address] [place] [address] since your last visit? Include any pap smears or colon screening.  No    Neurology Dr. Dariel Armando f/u 6/4/19

## 2019-05-16 NOTE — PROGRESS NOTES
Alba Escobedo, 35 y.o.,  female    SUBJECTIVE  Ff-up    Migraine headache-has stopped taking elavil altogether, reports to be too sedating even at lower dose given on last visit. She has been evaluated by neurology, and agrees with tca medication. And planning on sleep study. Depression/anxiety- reports some improvement in cymbalta. She does reports some improvement with overall fatigue as well. HTN-taking norvasc. She continues to smoke    IBS-C- responding to linzess, reviewed GI note plan for [facility] at age 38 per current guidelines. ROS:  See HPI, all others negative        Patient Active Problem List   Diagnosis Code    Tobacco use Z72.0    Tension headache G44.209    Recurrent depression ([address] F33.9    Essential hypertension I10    Chronic constipation K59.09    Abnormal uterine bleeding (AUB) N93.9    Secondary dysmenorrhea N94.5    Severe obesity (BMI 35.0-39. 9) with comorbidity (HCC) E66.01    Mild episode of recurrent major depressive disorder ([address] F33.0    Severe obesity (BMI 35.0-39. 9) E66.01    Migraine with aura and without status migrainosus, not intractable G43. 109    Sleeping difficulty G47.9    Irritable bowel syndrome with constipation K58.1       Current Outpatient Medications   Medication Sig Dispense Refill    DULoxetine (CYMBALTA) 30 mg capsule Take 1 Cap by mouth daily. 90 Cap 0    amLODIPine (NORVASC) 5 mg tablet Take 1 Tab by mouth daily. 90 Tab 2    SUMAtriptan (IMITREX) 100 mg tablet Take one tab at HA onset, may repeat once after 2 hours. Max two tabs in 24 hours 9 Tab 1    butalbital-acetaminophen-caff (FIORICET) -40 mg per capsule Take 1 Cap by Mouth Every 4 Hours. Allergies   Allergen Reactions    Carrot Swelling     Mouth and gum swelling      Pcn [Penicillins] Other (comments)     Patient states not allergic but gets candidiasis from it.      Strawberry Hives and Swelling       Past Medical History:   Diagnosis Date    Anxiety and depression     Bruises easily     Cervical high risk human papillomavirus (HPV) DNA test positive 03/2017    rpt pap smear 3/2018    Chronic constipation     dr. Veronica chandra    Contact dermatitis and other eczema, due to unspecified cause     Depression 12/2/2015    Essential hypertension 12/30/2015    GERD (gastroesophageal reflux disease) 9/13/2013    Gynecologic exam normal     Colwill    Headache(784.0)     Heavy menses     Hx of migraines     Irregular menses     Ovarian cyst     Pelvic pain        Social History     Socioeconomic History    Marital status: SINGLE     Spouse name: Not on file    Number of children: Not on file    Years of education: Not on file    Highest education level: Not on file   Occupational History    Not on file   Social Needs    Financial resource strain: Not on file    Food insecurity:     Worry: Not on file     Inability: Not on file    Transportation needs:     Medical: Not on file     Non-medical: Not on file   Tobacco Use    Smoking status: Current Every Day Smoker     Packs/day: 0.08     Years: 10.00     Pack years: 0.80    Smokeless tobacco: Never Used    Tobacco comment: smokes cigars - 1.5 a day   Substance and Sexual Activity    Alcohol use: Yes     Comment: occasionally    Drug use: No    Sexual activity: Not on file   Lifestyle    Physical activity:     Days per week: Not on file     Minutes per session: Not on file    Stress: Not on file   Relationships    Social connections:     Talks on phone: Not on file     Gets together: Not on file     Attends Spiritism service: Not on file     Active member of club or organization: Not on file     Attends meetings of clubs or organizations: Not on file     Relationship status: Not on file    Intimate partner violence:     Fear of current or ex partner: Not on file     Emotionally abused: Not on file     Physically abused: Not on file     Forced sexual activity: Not on file   Other Topics Concern  Not on file   Social History Narrative    Not on file       Family History   Problem Relation Age of Onset    Diabetes Mother     Hypertension Mother     Kidney Disease Mother         stones    Hypertension Father     Diabetes Sister     Hypertension Sister     Stroke Sister          OBJECTIVE    Physical Exam:     Visit Vitals  /82 (BP 1 Location: Left arm, BP Patient Position: Sitting)   Pulse 63   Temp 98.1 °F (36.7 °C) (Oral)   Resp 14   Ht 5' 5\" (1.651 m)   Wt 202 lb (91.6 kg)   LMP  (LMP Unknown)   SpO2 98%   BMI 33.61 kg/m²       General: alert, AA, in some discomfort  Neck: supple, no adenopathy palpated  CVS: normal rate, regular rhythm, distinct S1 and S2  Lungs:clear to ausculation bilaterally, no crackles, wheezing or rhonchi noted  Extremities: no edema, no cyanosis, MSK grossly normal  Skin: warm, no lesions, rashes noted  Psych:  mood and affect normal        ASSESSMENT/PLAN  Diagnoses and all orders for this visit:    1. Migraine with aura and without status migrainosus, not intractable  Fair control  Cont cymbalta  Cont prn imitrex  Upcoming sleep study, concern for FABIANA  Following neurology    2. Essential hypertension  Controlled, cont norvasc    3. Sleep difficulty  Trazodone ineffective  Elavil too sedating  Reiterated good sleep hygiene for now and will cont to monitor    4. Recurrent depression  Some improvement   Cont cymbalta  Refractory to celexa/zoloft    5. Tobacco use  Discussed cessation    6. IBS-C  On linzess  GI following      Follow-up and Dispositions    · Return in about 3 months (around 8/16/2019), or if symptoms worsen or fail to improve. Patient understands plan of care. Patient has provided input and agrees with goals.

## 2019-05-16 NOTE — PATIENT INSTRUCTIONS
DASH Diet: Care Instructions  Your Care Instructions    The DASH diet is an eating plan that can help lower your blood pressure. DASH stands for Dietary Approaches to Stop Hypertension. Hypertension is high blood pressure. The DASH diet focuses on eating foods that are high in calcium, potassium, and magnesium. These nutrients can lower blood pressure. The foods that are highest in these nutrients are fruits, vegetables, low-fat dairy products, nuts, seeds, and legumes. But taking calcium, potassium, and magnesium supplements instead of eating foods that are high in those nutrients does not have the same effect. The DASH diet also includes whole grains, fish, and poultry. The DASH diet is one of several lifestyle changes your doctor may recommend to lower your high blood pressure. Your doctor may also want you to decrease the amount of sodium in your diet. Lowering sodium while following the DASH diet can lower blood pressure even further than just the DASH diet alone. Follow-up care is a key part of your treatment and safety. Be sure to make and go to all appointments, and call your doctor if you are having problems. It's also a good idea to know your test results and keep a list of the medicines you take. How can you care for yourself at home? Following the DASH diet  · Eat 4 to 5 servings of fruit each day. A serving is 1 medium-sized piece of fruit, ½ cup chopped or canned fruit, 1/4 cup dried fruit, or 4 ounces (½ cup) of fruit juice. Choose fruit more often than fruit juice. · Eat 4 to 5 servings of vegetables each day. A serving is 1 cup of lettuce or raw leafy vegetables, ½ cup of chopped or cooked vegetables, or 4 ounces (½ cup) of vegetable juice. Choose vegetables more often than vegetable juice. · Get 2 to 3 servings of low-fat and fat-free dairy each day. A serving is 8 ounces of milk, 1 cup of yogurt, or 1 ½ ounces of cheese. · Eat 6 to 8 servings of grains each day.  A serving is 1 slice of bread, 1 ounce of dry cereal, or ½ cup of cooked rice, pasta, or cooked cereal. Try to choose whole-grain products as much as possible. · Limit lean meat, poultry, and fish to 2 servings each day. A serving is 3 ounces, about the size of a deck of cards. · Eat 4 to 5 servings of nuts, seeds, and legumes (cooked dried beans, lentils, and split peas) each week. A serving is 1/3 cup of nuts, 2 tablespoons of seeds, or ½ cup of cooked beans or peas. · Limit fats and oils to 2 to 3 servings each day. A serving is 1 teaspoon of vegetable oil or 2 tablespoons of salad dressing. · Limit sweets and added sugars to 5 servings or less a week. A serving is 1 tablespoon jelly or jam, ½ cup sorbet, or 1 cup of lemonade. · Eat less than 2,300 milligrams (mg) of sodium a day. If you limit your sodium to 1,500 mg a day, you can lower your blood pressure even more. Tips for success  · Start small. Do not try to make dramatic changes to your diet all at once. You might feel that you are missing out on your favorite foods and then be more likely to not follow the plan. Make small changes, and stick with them. Once those changes become habit, add a few more changes. · Try some of the following:  ? Make it a goal to eat a fruit or vegetable at every meal and at snacks. This will make it easy to get the recommended amount of fruits and vegetables each day. ? Try yogurt topped with fruit and nuts for a snack or healthy dessert. ? Add lettuce, tomato, cucumber, and onion to sandwiches. ? Combine a ready-made pizza crust with low-fat mozzarella cheese and lots of vegetable toppings. Try using tomatoes, squash, spinach, broccoli, carrots, cauliflower, and onions. ? Have a variety of cut-up vegetables with a low-fat dip as an appetizer instead of chips and dip. ? Sprinkle sunflower seeds or chopped almonds over salads. Or try adding chopped walnuts or almonds to cooked vegetables.   ? Try some vegetarian meals using beans and peas. Add garbanzo or kidney beans to salads. Make burritos and tacos with mashed pitts beans or black beans. Where can you learn more? Go to http://sunita-kali.info/. Enter N608 in the search box to learn more about \"DASH Diet: Care Instructions. \"  Current as of: July 22, 2018  Content Version: 11.9  © 4579-9079 [employer]. Care instructions adapted under license by [employer] (which disclaims liability or warranty for this information). If you have questions about a medical condition or this instruction, always ask your healthcare professional. [address] any warranty or liability for your use of this information.

## 2019-05-16 NOTE — LETTER
NOTIFICATION RETURN TO WORK  
 
5/16/2019 2:39 PM 
 
Ms. Jerryl Saint [id number] [place] [id number] To Whom It May Concern: 
 
Jerryl Saint was seen today at  [address] She will return to work on: 5/17/19 If there are questions or concerns please have the patient contact our office.  
 
 
 
Sincerely, 
 
 
Jenna Barrientos MD

## 2019-05-21 ENCOUNTER — HOSPITAL ENCOUNTER (OUTPATIENT)
Dept: SLEEP MEDICINE | Age: 39
Discharge: HOME | End: 2019-05-21
Payer: COMMERCIAL

## 2019-05-21 DIAGNOSIS — G47.10 HYPERSOMNIA: ICD-10-CM

## 2019-05-21 DIAGNOSIS — G47.8 UPPER AIRWAY RESISTANCE SYNDROME: ICD-10-CM

## 2019-05-21 PROCEDURE — 95806 SLEEP STUDY UNATT&RESP EFFT: CPT

## 2019-06-04 ENCOUNTER — OFFICE VISIT (OUTPATIENT)
Dept: NEUROLOGY | Age: 39
End: 2019-06-04

## 2019-06-04 VITALS
WEIGHT: 201 LBS | SYSTOLIC BLOOD PRESSURE: 102 MMHG | BODY MASS INDEX: 33.49 KG/M2 | HEART RATE: 60 BPM | DIASTOLIC BLOOD PRESSURE: 70 MMHG | TEMPERATURE: 98.1 F | RESPIRATION RATE: 16 BRPM | OXYGEN SATURATION: 98 % | HEIGHT: 65 IN

## 2019-06-04 DIAGNOSIS — E66.01 MORBID OBESITY (HCC): ICD-10-CM

## 2019-06-04 DIAGNOSIS — G47.33 OSA (OBSTRUCTIVE SLEEP APNEA): Primary | ICD-10-CM

## 2019-06-04 NOTE — PROGRESS NOTES
ROOM # 1    Adama Ginnie Schlatter presents today for   Chief Complaint   Patient presents with    Follow-up    Sleep Study         Visit Vitals  /70 (BP 1 Location: Left arm, BP Patient Position: Sitting)   Pulse 60   Temp 98.1 °F (36.7 °C) (Oral)   Resp 16   Ht 5' 5\" (1.651 m)   Wt 201 lb (91.2 kg)   SpO2 98%   BMI 33.45 kg/m²         Advance Directive:  1. Do you have an advance directive in place? Patient Reply: No    2. If not, would you like material regarding how to put one in place? Patient Reply: No    Coordination of Care:  1. Have you been to the ER, urgent care clinic since your last visit? Hospitalized since your last visit?  No

## 2019-06-04 NOTE — PROGRESS NOTES
6/4/2019 11:09 AM    SSN: [id number]    Subjective:  34 y/o female returning for CC of EDS, snoring, obesity, and headaches, particularly in am. FABIANA was suspect.      A home sleep study showed an AHI of 8.8 and desaturations down to 79% on May 21    Social History     Socioeconomic History    Marital status: SINGLE     Spouse name: Not on file    Number of children: Not on file    Years of education: Not on file    Highest education level: Not on file   Occupational History    Not on file   Social Needs    Financial resource strain: Not on file    Food insecurity:     Worry: Not on file     Inability: Not on file    Transportation needs:     Medical: Not on file     Non-medical: Not on file   Tobacco Use    Smoking status: Current Every Day Smoker     Packs/day: 0.08     Years: 10.00     Pack years: 0.80    Smokeless tobacco: Never Used    Tobacco comment: smokes cigars - 1.5 a day   Substance and Sexual Activity    Alcohol use: Yes     Comment: occasionally    Drug use: No    Sexual activity: Not on file   Lifestyle    Physical activity:     Days per week: Not on file     Minutes per session: Not on file    Stress: Not on file   Relationships    Social connections:     Talks on phone: Not on file     Gets together: Not on file     Attends Latter day service: Not on file     Active member of club or organization: Not on file     Attends meetings of clubs or organizations: Not on file     Relationship status: Not on file    Intimate partner violence:     Fear of current or ex partner: Not on file     Emotionally abused: Not on file     Physically abused: Not on file     Forced sexual activity: Not on file   Other Topics Concern    Not on file   Social History Narrative    Not on file       Family History   Problem Relation Age of Onset    Diabetes Mother     Hypertension Mother     Kidney Disease Mother         stones    Hypertension Father     Diabetes Sister    [facility] Hypertension Sister     Stroke Sister        Current Outpatient Medications   Medication Sig Dispense Refill    DULoxetine (CYMBALTA) 30 mg capsule Take 1 Cap by mouth daily. 90 Cap 0    amLODIPine (NORVASC) 5 mg tablet Take 1 Tab by mouth daily. 90 Tab 2    SUMAtriptan (IMITREX) 100 mg tablet Take one tab at HA onset, may repeat once after 2 hours. Max two tabs in 24 hours 9 Tab 1    butalbital-acetaminophen-caff (FIORICET) -40 mg per capsule Take 1 Cap by Mouth Every 4 Hours. Past Medical History:   Diagnosis Date    Anxiety and depression     Bruises easily     Cervical high risk human papillomavirus (HPV) DNA test positive 03/2017    rpt pap smear 3/2018    Chronic constipation     dr. Netta chandra    Contact dermatitis and other eczema, due to unspecified cause     Depression 12/2/2015    Essential hypertension 12/30/2015    GERD (gastroesophageal reflux disease) 9/13/2013    Gynecologic exam normal     Colwill    Headache(784.0)     Heavy menses     Hx of migraines     Irregular menses     Ovarian cyst     Pelvic pain        Past Surgical History:   Procedure Laterality Date    HX APPENDECTOMY      HX TUBAL LIGATION         Allergies   Allergen Reactions    Carrot Swelling     Mouth and gum swelling      Pcn [Penicillins] Other (comments)     Patient states not allergic but gets candidiasis from it.  Strawberry Hives and Swelling       Vital signs:    Visit Vitals  /70 (BP 1 Location: Left arm, BP Patient Position: Sitting)   Pulse 60   Temp 98.1 °F (36.7 °C) (Oral)   Resp 16   Ht 5' 5\" (1.651 m)   Wt 91.2 kg (201 lb)   LMP  (LMP Unknown)   SpO2 98%   BMI 33.45 kg/m²       Review of Systems:   GENERAL: Denies fever, reports fatigue  CARDIAC: No CP or SOB  PULMONARY: No cough of SOB  MUSCULOSKELETAL: No new joint pain  NEURO: SEE HPI      EXAM: Alert, in NAD. Heart is regular. Oriented x3, EOM's are full, PERRL, no facial asymmetries.  Strength and tone are normal. DTR's +2, gait symmetric     Assessment/Plan: Mild overall obstructive sleep apnea, but with significant desaturations, with snoring, excessive daytime sleepiness, hypertension, depression, tobacco use, and in particularly headaches all associated with it. The patient was somewhat emotional about the diagnosis. I explained that this should be seen as a positive as it is a treatable condition that should give her clinical benefit. She is therefore open to try CPAP, I will try her on auto CPAP 5-15 cm of H2O, DME will help with mask selection. I discussed CPAP pitfalls. I counseled her about weight loss. She will start with this and come to see me in 2 months with a download. PLEASE NOTE:   Portions of this document may have been produced using voice recognition software. Unrecognized errors in transcription may be present. This note will not be viewable in [address]

## 2019-08-07 ENCOUNTER — TELEPHONE (OUTPATIENT)
Dept: FAMILY MEDICINE CLINIC | Age: 39
End: 2019-08-07

## 2019-08-07 NOTE — TELEPHONE ENCOUNTER
Pt is requesting the RX Trazodone again. She states that she is having trouble sleeping again. Please advise.

## 2019-08-09 RX ORDER — TRAZODONE HYDROCHLORIDE 50 MG/1
50 TABLET ORAL
Qty: 30 TAB | Refills: 0 | Status: SHIPPED | OUTPATIENT
Start: 2019-08-09 | End: 2020-09-18

## 2019-08-09 NOTE — TELEPHONE ENCOUNTER
Contacted patient and verified identity using name and date of birth (2- identifiers)  Spoke with patient and advised of Rx to pharmacy. Also advised to keep 8/16/19 FU appointment.

## 2019-08-16 ENCOUNTER — OFFICE VISIT (OUTPATIENT)
Dept: NEUROLOGY | Age: 39
End: 2019-08-16

## 2019-08-16 ENCOUNTER — OFFICE VISIT (OUTPATIENT)
Dept: FAMILY MEDICINE CLINIC | Age: 39
End: 2019-08-16

## 2019-08-16 VITALS
SYSTOLIC BLOOD PRESSURE: 118 MMHG | HEART RATE: 84 BPM | OXYGEN SATURATION: 98 % | BODY MASS INDEX: 33.32 KG/M2 | HEIGHT: 65 IN | TEMPERATURE: 98.4 F | DIASTOLIC BLOOD PRESSURE: 82 MMHG | RESPIRATION RATE: 16 BRPM | WEIGHT: 200 LBS

## 2019-08-16 VITALS
SYSTOLIC BLOOD PRESSURE: 130 MMHG | OXYGEN SATURATION: 99 % | HEART RATE: 80 BPM | BODY MASS INDEX: 33.49 KG/M2 | RESPIRATION RATE: 16 BRPM | WEIGHT: 201 LBS | HEIGHT: 65 IN | TEMPERATURE: 97.3 F | DIASTOLIC BLOOD PRESSURE: 90 MMHG

## 2019-08-16 DIAGNOSIS — G43.019 INTRACTABLE MIGRAINE WITHOUT AURA AND WITHOUT STATUS MIGRAINOSUS: ICD-10-CM

## 2019-08-16 DIAGNOSIS — G47.9 SLEEPING DIFFICULTY: ICD-10-CM

## 2019-08-16 DIAGNOSIS — I10 ESSENTIAL HYPERTENSION: ICD-10-CM

## 2019-08-16 DIAGNOSIS — G47.33 OSA (OBSTRUCTIVE SLEEP APNEA): Primary | ICD-10-CM

## 2019-08-16 DIAGNOSIS — E66.01 MORBID OBESITY (HCC): ICD-10-CM

## 2019-08-16 DIAGNOSIS — Z13.1 SCREENING FOR DIABETES MELLITUS (DM): ICD-10-CM

## 2019-08-16 DIAGNOSIS — F41.9 ANXIETY: ICD-10-CM

## 2019-08-16 DIAGNOSIS — K58.1 IRRITABLE BOWEL SYNDROME WITH CONSTIPATION: ICD-10-CM

## 2019-08-16 DIAGNOSIS — F33.9 RECURRENT DEPRESSION (HCC): ICD-10-CM

## 2019-08-16 DIAGNOSIS — R10.30 LOWER ABDOMINAL PAIN: Primary | ICD-10-CM

## 2019-08-16 DIAGNOSIS — Z72.0 TOBACCO USE: ICD-10-CM

## 2019-08-16 LAB
BILIRUB UR QL STRIP: NORMAL
GLUCOSE UR-MCNC: NEGATIVE MG/DL
KETONES P FAST UR STRIP-MCNC: NORMAL MG/DL
PH UR STRIP: 5.5 [PH] (ref 4.6–8)
PROT UR QL STRIP: NORMAL
SP GR UR STRIP: 1.03 (ref 1–1.03)
UA UROBILINOGEN AMB POC: NORMAL (ref 0.2–1)
URINALYSIS CLARITY POC: CLEAR
URINALYSIS COLOR POC: YELLOW
URINE BLOOD POC: NEGATIVE
URINE LEUKOCYTES POC: NEGATIVE
URINE NITRITES POC: NEGATIVE

## 2019-08-16 NOTE — PROGRESS NOTES
Anjum Laureanorobert, 35 y.o.,  female    SUBJECTIVE  Ff-up    Depression/anxiety- reports feeling worse past month, increased anxiety, difficulty sleeping, frequrent crying. Says son recently diagnosed with bipolar disorder and challenging for her. She continues to take cymbalta. She called last week and wanted to resume trazodone. She says has been effective. HTN-taking norvasc. She continues to smoke    IBS-C- responding to linzess, reviewed GI note plan for [facility] at age 38 per current guidelines. She reports increasing lower abdominal pain past month, still with constipation. She denies urinary symptoms, no bloating, nausea, vomiting. Reports no longer with headaches    ROS:  See HPI, all others negative        Patient Active Problem List   Diagnosis Code    Tobacco use Z72.0    Tension headache G44.209    Recurrent depression ([facility] [address] F33.9    Essential hypertension I10    Chronic constipation K59.09    Abnormal uterine bleeding (AUB) N93.9    Secondary dysmenorrhea N94.5    Severe obesity (BMI 35.0-39. 9) with comorbidity (HCC) E66.01    Mild episode of recurrent major depressive disorder ([address] F33.0    Severe obesity (BMI 35.0-39. 9) E66.01    Migraine with aura and without status migrainosus, not intractable G43. 109    Sleeping difficulty G47.9    Irritable bowel syndrome with constipation K58.1       Current Outpatient Medications   Medication Sig Dispense Refill    traZODone (DESYREL) 50 mg tablet Take 1 Tab by mouth nightly. 30 Tab 0    DULoxetine (CYMBALTA) 30 mg capsule Take 1 Cap by mouth daily. 90 Cap 0    amLODIPine (NORVASC) 5 mg tablet Take 1 Tab by mouth daily. 90 Tab 2    SUMAtriptan (IMITREX) 100 mg tablet Take one tab at HA onset, may repeat once after 2 hours. Max two tabs in 24 hours 9 Tab 1    butalbital-acetaminophen-caff (FIORICET) -40 mg per capsule Take 1 Cap by Mouth Every 4 Hours.          Allergies   Allergen Reactions    Carrot Swelling     Mouth and gum swelling      Pcn [Penicillins] Other (comments)     Patient states not allergic but gets candidiasis from it.      Strawberry Hives and Swelling       Past Medical History:   Diagnosis Date    Anxiety and depression     Bruises easily     Cervical high risk human papillomavirus (HPV) DNA test positive 03/2017    rpt pap smear 3/2018    Chronic constipation     corazon, dr. Curry Almeida    Contact dermatitis and other eczema, due to unspecified cause     Depression 12/2/2015    Essential hypertension 12/30/2015    GERD (gastroesophageal reflux disease) 9/13/2013    Gynecologic exam normal     Colwill    Headache(784.0)     Heavy menses     Hx of migraines     Irregular menses     Ovarian cyst     Pelvic pain        Social History     Socioeconomic History    Marital status: SINGLE     Spouse name: Not on file    Number of children: Not on file    Years of education: Not on file    Highest education level: Not on file   Occupational History    Not on file   Social Needs    Financial resource strain: Not on file    Food insecurity:     Worry: Not on file     Inability: Not on file    Transportation needs:     Medical: Not on file     Non-medical: Not on file   Tobacco Use    Smoking status: Current Every Day Smoker     Packs/day: 0.08     Years: 10.00     Pack years: 0.80    Smokeless tobacco: Never Used    Tobacco comment: smokes cigars - 1.5 a day   Substance and Sexual Activity    Alcohol use: Yes     Comment: occasionally    Drug use: No    Sexual activity: Not on file   Lifestyle    Physical activity:     Days per week: Not on file     Minutes per session: Not on file    Stress: Not on file   Relationships    Social connections:     Talks on phone: Not on file     Gets together: Not on file     Attends [employer]regation service: Not on file     Active member of club or organization: Not on file     Attends meetings of clubs or organizations: Not on file     Relationship status: Not on file   [facility] Intimate partner violence:     Fear of current or ex partner: Not on file     Emotionally abused: Not on file     Physically abused: Not on file     Forced sexual activity: Not on file   Other Topics Concern    Not on file   Social History Narrative    Not on file       Family History   Problem Relation Age of Onset    Diabetes Mother     Hypertension Mother     Kidney Disease Mother         stones    Hypertension Father     Diabetes Sister     Hypertension Sister     Stroke Sister          OBJECTIVE    Physical Exam:     Visit Vitals  /82 (BP 1 Location: Left arm, BP Patient Position: Sitting)   Pulse 84   Temp 98.4 °F (36.9 °C) (Oral)   Resp 16   Ht 5' 5\" (1.651 m)   Wt 200 lb (90.7 kg)   SpO2 98%   BMI 33.28 kg/m²       General: alert, AA, in some discomfort  Neck: supple, no adenopathy palpated  CVS: normal rate, regular rhythm, distinct S1 and S2  Lungs:clear to ausculation bilaterally, no crackles, wheezing or rhonchi noted  Extremities: no edema, no cyanosis, MSK grossly normal  Skin: warm, no lesions, rashes noted  Psych:  mood and affect normal    Results for orders placed or performed in visit on 08/16/19   AMB POC URINALYSIS DIP STICK AUTO W/O MICRO   Result Value Ref Range    Color (UA POC) Yellow     Clarity (UA POC) Clear     Glucose (UA POC) Negative Negative    Bilirubin (UA POC) 1+ Negative    Ketones (UA POC) Trace Negative    Specific gravity (UA POC) 1.030 1.001 - 1.035    Blood (UA POC) Negative Negative    pH (UA POC) 5.5 4.6 - 8.0    Protein (UA POC) Trace Negative    Urobilinogen (UA POC) 1 mg/dL 0.2 - 1    Nitrites (UA POC) Negative Negative    Leukocyte esterase (UA POC) Negative Negative             ASSESSMENT/PLAN  Diagnoses and all orders for this visit:    Recurrent depression  Needs better control  Will refer to psychiatry and psychology  Cont cymbalta  Refractory to celexa/zoloft    Essential hypertension  Controlled, cont norvasc  Check cmp, lipid panel, a1c in 3 months prior to next visit    Sleep difficulty  Improved   Cont trazodone  Tobacco use  Discussed cessation    IBS-C  Neg ua, likely causing mild abdominal discomfort now  On linzess  Discussed stress management strategies, exercise/meditation  GI following    Lower abdominal pain  ua neg LE/nitrites    Screening for DM  A1c/ cmp prior to next visit    Follow-up and Dispositions    · Return in about 3 months (around 11/16/2019), or if symptoms worsen or fail to improve, for routine chronic illness care, fasting labs a week prior to your next visit. Patient understands plan of care. Patient has provided input and agrees with goals.

## 2019-08-16 NOTE — PATIENT INSTRUCTIONS
DASH Diet: Care Instructions  Your Care Instructions    The DASH diet is an eating plan that can help lower your blood pressure. DASH stands for Dietary Approaches to Stop Hypertension. Hypertension is high blood pressure. The DASH diet focuses on eating foods that are high in calcium, potassium, and magnesium. These nutrients can lower blood pressure. The foods that are highest in these nutrients are fruits, vegetables, low-fat dairy products, nuts, seeds, and legumes. But taking calcium, potassium, and magnesium supplements instead of eating foods that are high in those nutrients does not have the same effect. The DASH diet also includes whole grains, fish, and poultry. The DASH diet is one of several lifestyle changes your doctor may recommend to lower your high blood pressure. Your doctor may also want you to decrease the amount of sodium in your diet. Lowering sodium while following the DASH diet can lower blood pressure even further than just the DASH diet alone. Follow-up care is a key part of your treatment and safety. Be sure to make and go to all appointments, and call your doctor if you are having problems. It's also a good idea to know your test results and keep a list of the medicines you take. How can you care for yourself at home? Following the DASH diet  · Eat 4 to 5 servings of fruit each day. A serving is 1 medium-sized piece of fruit, ½ cup chopped or canned fruit, 1/4 cup dried fruit, or 4 ounces (½ cup) of fruit juice. Choose fruit more often than fruit juice. · Eat 4 to 5 servings of vegetables each day. A serving is 1 cup of lettuce or raw leafy vegetables, ½ cup of chopped or cooked vegetables, or 4 ounces (½ cup) of vegetable juice. Choose vegetables more often than vegetable juice. · Get 2 to 3 servings of low-fat and fat-free dairy each day. A serving is 8 ounces of milk, 1 cup of yogurt, or 1 ½ ounces of cheese. · Eat 6 to 8 servings of grains each day.  A serving is 1 slice of bread, 1 ounce of dry cereal, or ½ cup of cooked rice, pasta, or cooked cereal. Try to choose whole-grain products as much as possible. · Limit lean meat, poultry, and fish to 2 servings each day. A serving is 3 ounces, about the size of a deck of cards. · Eat 4 to 5 servings of nuts, seeds, and legumes (cooked dried beans, lentils, and split peas) each week. A serving is 1/3 cup of nuts, 2 tablespoons of seeds, or ½ cup of cooked beans or peas. · Limit fats and oils to 2 to 3 servings each day. A serving is 1 teaspoon of vegetable oil or 2 tablespoons of salad dressing. · Limit sweets and added sugars to 5 servings or less a week. A serving is 1 tablespoon jelly or jam, ½ cup sorbet, or 1 cup of lemonade. · Eat less than 2,300 milligrams (mg) of sodium a day. If you limit your sodium to 1,500 mg a day, you can lower your blood pressure even more. Tips for success  · Start small. Do not try to make dramatic changes to your diet all at once. You might feel that you are missing out on your favorite foods and then be more likely to not follow the plan. Make small changes, and stick with them. Once those changes become habit, add a few more changes. · Try some of the following:  ? Make it a goal to eat a fruit or vegetable at every meal and at snacks. This will make it easy to get the recommended amount of fruits and vegetables each day. ? Try yogurt topped with fruit and nuts for a snack or healthy dessert. ? Add lettuce, tomato, cucumber, and onion to sandwiches. ? Combine a ready-made pizza crust with low-fat mozzarella cheese and lots of vegetable toppings. Try using tomatoes, squash, spinach, broccoli, carrots, cauliflower, and onions. ? Have a variety of cut-up vegetables with a low-fat dip as an appetizer instead of chips and dip. ? Sprinkle sunflower seeds or chopped almonds over salads. Or try adding chopped walnuts or almonds to cooked vegetables.   ? Try some vegetarian meals using beans and peas. Add garbanzo or kidney beans to salads. Make burritos and tacos with mashed pitts beans or black beans. Where can you learn more? Go to http://sunita-kali.info/. Enter Q120 in the search box to learn more about \"DASH Diet: Care Instructions. \"  Current as of: July 22, 2018  Content Version: 12.1  © 7226-0457 [employer], [employer]. Care instructions adapted under license by [employer] (which disclaims liability or warranty for this information). If you have questions about a medical condition or this instruction, always ask your healthcare professional. [address] any warranty or liability for your use of this information.

## 2019-08-16 NOTE — PROGRESS NOTES
8/16/2019 11:55 AM    SSN: [id number]    Subjective:   54-year-old female returning for follow-up of obstructive sleep apnea, last time here in June. On that visit we reviewed her history of excessive daytime sleepiness, snoring, obesity, morning headaches and a home sleep study that showed an AHI of 8.8 with desaturations down to 79% on May 21. She started out of CPAP well initially the beginning. She reports that she has felt more energetic and that she has been sleeping better. She had a couple of nosebleeds and was advised to increase the humidity from 4-5 care of this. She has missed some days because she has been out of town. Her download through August 14 so 18 out of 30 days of use with average use of 5 hours and 25 minutes, AHI of 1.9, median leak at 8.4 cm H2O.     Social History     Socioeconomic History    Marital status: SINGLE     Spouse name: Not on file    Number of children: Not on file    Years of education: Not on file    Highest education level: Not on file   Occupational History    Not on file   Social Needs    Financial resource strain: Not on file    Food insecurity:     Worry: Not on file     Inability: Not on file    Transportation needs:     Medical: Not on file     Non-medical: Not on file   Tobacco Use    Smoking status: Current Every Day Smoker     Packs/day: 0.08     Years: 10.00     Pack years: 0.80    Smokeless tobacco: Never Used    Tobacco comment: smokes cigars - 1.5 a day   Substance and Sexual Activity    Alcohol use: Yes     Comment: occasionally    Drug use: No    Sexual activity: Not on file   Lifestyle    Physical activity:     Days per week: Not on file     Minutes per session: Not on file    Stress: Not on file   Relationships    Social connections:     Talks on phone: Not on file     Gets together: Not on file     Attends Hindu service: Not on file     Active member of club or organization: Not on file     Attends meetings of clubs or organizations: Not on file     Relationship status: Not on file    Intimate partner violence:     Fear of current or ex partner: Not on file     Emotionally abused: Not on file     Physically abused: Not on file     Forced sexual activity: Not on file   Other Topics Concern    Not on file   Social History Narrative    Not on file       Family History   Problem Relation Age of Onset    Diabetes Mother     Hypertension Mother     Kidney Disease Mother         stones    Hypertension Father     Diabetes Sister     Hypertension Sister     Stroke Sister        Current Outpatient Medications   Medication Sig Dispense Refill    traZODone (DESYREL) 50 mg tablet Take 1 Tab by mouth nightly. 30 Tab 0    DULoxetine (CYMBALTA) 30 mg capsule Take 1 Cap by mouth daily. 90 Cap 0    amLODIPine (NORVASC) 5 mg tablet Take 1 Tab by mouth daily. 90 Tab 2    SUMAtriptan (IMITREX) 100 mg tablet Take one tab at HA onset, may repeat once after 2 hours. Max two tabs in 24 hours 9 Tab 1    butalbital-acetaminophen-caff (FIORICET) -40 mg per capsule Take 1 Cap by Mouth Every 4 Hours.          Past Medical History:   Diagnosis Date    Anxiety and depression     Bruises easily     Cervical high risk human papillomavirus (HPV) DNA test positive 03/2017    rpt pap smear 3/2018    Chronic constipation     dr. Stacey chandra    Contact dermatitis and other eczema, due to unspecified cause     Depression 12/2/2015    Essential hypertension 12/30/2015    GERD (gastroesophageal reflux disease) 9/13/2013    Gynecologic exam normal     Colwill    Headache(784.0)     Heavy menses     Hx of migraines     Irregular menses     Ovarian cyst     Pelvic pain        Past Surgical History:   Procedure Laterality Date    HX APPENDECTOMY      HX TUBAL LIGATION         Allergies   Allergen Reactions    Carrot Swelling     Mouth and gum swelling      Pcn [Penicillins] Other (comments)     Patient states not allergic but gets candidiasis from it.  Strawberry Hives and Swelling       Vital signs:    Visit Vitals  /90 (BP 1 Location: Left arm, BP Patient Position: Sitting)   Pulse 80   Temp 97.3 °F (36.3 °C) (Oral)   Resp 16   Ht 5' 5\" (1.651 m)   Wt 91.2 kg (201 lb)   LMP  (LMP Unknown)   SpO2 99%   BMI 33.45 kg/m²       Review of Systems:   GENERAL: Denies fever, reports fatigue  CARDIAC: No CP or SOB  PULMONARY: No cough of SOB  MUSCULOSKELETAL: No new joint pain  NEURO: SEE HPI      EXAM: Alert, in NAD. Heart is regular. Oriented x3, EOM's are full, PERRL, no facial asymmetries. Strength and tone are normal. DTR's +2, gait symmetric           Assessment/Plan: Mild overall obstructive sleep apnea, symptomatic and is significantly improving so far with his CPAP with a decent start that has about the optimal benefits to be obtained with use of 7 hours or more per night. I advised her about how to travel with a CPAP to try to ensure that she is using it every night. Advised her about humidity adjustments. Otherwise she is doing pretty well and will return to see me in 3 months for another download check. PLEASE NOTE:   Portions of this document may have been produced using voice recognition software. Unrecognized errors in transcription may be present. This note will not be viewable in [address]

## 2019-08-16 NOTE — PROGRESS NOTES
1. Have you been to the ER, urgent care clinic since your last visit? Hospitalized since your last visit? No    2. Have you seen or consulted any other health care providers outside of the [address] [place] [address] since your last visit? Include any pap smears or colon screening.  No

## 2019-08-16 NOTE — PROGRESS NOTES
Jazzmine Ashley presents today for   Chief Complaint   Patient presents with    Follow-up    Sleep Apnea       Is someone accompanying this pt? No    Is the patient using any DME equipment during OV? No    Depression Screening:  3 most recent PHQ Screens 10/7/2015   PHQ Not Done Medical Reason (indicate in comments)       Learning Assessment:  Learning Assessment 11/4/2015   PRIMARY LEARNER Patient   HIGHEST LEVEL OF EDUCATION - PRIMARY LEARNER  TR[employer]CHO[employer]   BARRIERS PRIMARY LEARNER NONE   CO-LEARNER CAREGIVER No   PRIMARY LANGUAGE ENGLISH    NEED No   LEARNER PREFERENCE PRIMARY LISTENING     READING   LEARNING SPECIAL TOPICS No   ANSWERED BY patient   RELATIONSHIP SELF       Abuse Screening:  Abuse Screening Questionnaire 3/15/2019   Do you ever feel afraid of your partner? N   Are you in a relationship with someone who physically or mentally threatens you? N   Is it safe for you to go home? Y         Coordination of Care:  1. Have you been to the ER, urgent care clinic since your last visit? Hospitalized since your last visit? No    2. Have you seen or consulted any other health care providers outside of the [address] [place] [address] since your last visit? Include any pap smears or colon screening.  No

## 2019-09-25 DIAGNOSIS — I10 ESSENTIAL HYPERTENSION: ICD-10-CM

## 2019-09-25 RX ORDER — AMLODIPINE BESYLATE 5 MG/1
5 TABLET ORAL DAILY
Qty: 90 TAB | Refills: 0 | Status: SHIPPED | OUTPATIENT
Start: 2019-09-25 | End: 2020-03-12 | Stop reason: SDUPTHER

## 2019-09-25 RX ORDER — DULOXETIN HYDROCHLORIDE 30 MG/1
30 CAPSULE, DELAYED RELEASE ORAL DAILY
Qty: 90 CAP | Refills: 0 | Status: SHIPPED | OUTPATIENT
Start: 2019-09-25 | End: 2022-03-31 | Stop reason: SDUPTHER

## 2019-09-25 NOTE — TELEPHONE ENCOUNTER
This patient contacted office for the following prescriptions to be filled:    Medication requested :   Requested Prescriptions     Pending Prescriptions Disp Refills    DULoxetine (CYMBALTA) 30 mg capsule 90 Cap 0     Sig: Take 1 Cap by mouth daily.  amLODIPine (NORVASC) 5 mg tablet 90 Tab 2     Sig: Take 1 Tab by mouth daily.      PCP: Oren Blank or Print: [facility]   Mail order or Local pharmacy [phone]    Scheduled appointment if not seen by current providers in office: ISAIAH

## 2019-10-09 ENCOUNTER — TELEPHONE (OUTPATIENT)
Dept: NEUROLOGY | Age: 39
End: 2019-10-09

## 2019-11-18 ENCOUNTER — OFFICE VISIT (OUTPATIENT)
Dept: FAMILY MEDICINE CLINIC | Age: 39
End: 2019-11-18

## 2019-11-18 VITALS
WEIGHT: 202 LBS | OXYGEN SATURATION: 98 % | SYSTOLIC BLOOD PRESSURE: 124 MMHG | HEART RATE: 84 BPM | RESPIRATION RATE: 16 BRPM | BODY MASS INDEX: 33.66 KG/M2 | HEIGHT: 65 IN | DIASTOLIC BLOOD PRESSURE: 86 MMHG | TEMPERATURE: 98.8 F

## 2019-11-18 DIAGNOSIS — G43.109 MIGRAINE WITH AURA AND WITHOUT STATUS MIGRAINOSUS, NOT INTRACTABLE: ICD-10-CM

## 2019-11-18 DIAGNOSIS — J02.9 SORE THROAT: Primary | ICD-10-CM

## 2019-11-18 DIAGNOSIS — F41.9 ANXIETY: ICD-10-CM

## 2019-11-18 DIAGNOSIS — J01.00 ACUTE NON-RECURRENT MAXILLARY SINUSITIS: ICD-10-CM

## 2019-11-18 DIAGNOSIS — K58.1 IRRITABLE BOWEL SYNDROME WITH CONSTIPATION: ICD-10-CM

## 2019-11-18 DIAGNOSIS — I10 ESSENTIAL HYPERTENSION: ICD-10-CM

## 2019-11-18 DIAGNOSIS — F33.9 RECURRENT DEPRESSION (HCC): ICD-10-CM

## 2019-11-18 DIAGNOSIS — Z99.89 OSA ON CPAP: ICD-10-CM

## 2019-11-18 DIAGNOSIS — J02.0 STREP THROAT: ICD-10-CM

## 2019-11-18 DIAGNOSIS — G47.33 OSA ON CPAP: ICD-10-CM

## 2019-11-18 LAB
S PYO AG THROAT QL: POSITIVE
VALID INTERNAL CONTROL?: YES

## 2019-11-18 RX ORDER — AMOXICILLIN AND CLAVULANATE POTASSIUM 875; 125 MG/1; MG/1
1 TABLET, FILM COATED ORAL EVERY 12 HOURS
Qty: 20 TAB | Refills: 0 | Status: SHIPPED | OUTPATIENT
Start: 2019-11-18 | End: 2019-11-28

## 2019-11-18 RX ORDER — FLUTICASONE PROPIONATE 50 MCG
2 SPRAY, SUSPENSION (ML) NASAL DAILY
Qty: 1 BOTTLE | Refills: 0 | Status: SHIPPED | OUTPATIENT
Start: 2019-11-18 | End: 2021-02-12 | Stop reason: SDUPTHER

## 2019-11-18 RX ORDER — FLUCONAZOLE 150 MG/1
150 TABLET ORAL DAILY
Qty: 1 TAB | Refills: 0 | Status: SHIPPED | OUTPATIENT
Start: 2019-11-18 | End: 2019-11-19

## 2019-11-18 NOTE — LETTER
NOTIFICATION RETURN TO WORK / SCHOOL 
 
11/18/2019 10:14 AM 
 
Ms. Herminia Chen [address] [place] [id number] To Whom It May Concern: 
 
Herminia Chen is currently under the care of [facility]. She will return to work/school on: 11/20/19 If there are questions or concerns please have the patient contact our office.  
 
 
 
Sincerely, 
 
 
Yen Strickland MD

## 2019-11-18 NOTE — PROGRESS NOTES
1. Have you been to the ER, urgent care clinic since your last visit? Hospitalized since your last visit? No    2. Have you seen or consulted any other health care providers outside of the [address] [place] [address] since your last visit? Include any pap smears or colon screening.  Dr. Kalen Bauer

## 2019-11-18 NOTE — PROGRESS NOTES
Christean Spatz, 35 y.o.,  female    SUBJECTIVE  Ff-up    Depression/anxiety- has transitionied to LILA camargo CPA and refviewed not, trying her on lexapro and seroquel to hopefully address sleep, and discontinued cymbalta/trazodone. She says she had better response on previous and went back to taking her cymbalta trazodone. She has upcoming appt with psychiatry this week and encouraged her to relay this information. HTN-taking norvasc. She continues to smoke    IBS-C- about the same, responding to linzess, reviewed GI note plan for [facility] at age 38 per current guidelines. FABIANA on CPAP- recent dx, following dr. Dennis De La Fuente, reports improved headaches after starting machine. C/o nasal congestion with productive yellowish thick discharge, post nasal drip, sore throat for the past 3 days. No fever, cough, sob or wheezing. ROS:  See HPI, all others negative        Patient Active Problem List   Diagnosis Code    Tobacco use Z72.0    Tension headache G44.209    Recurrent depression ([address] F33.9    Essential hypertension I10    Chronic constipation K59.09    Abnormal uterine bleeding (AUB) N93.9    Secondary dysmenorrhea N94.5    Severe obesity (BMI 35.0-39. 9) with comorbidity (HCC) E66.01    Mild episode of recurrent major depressive disorder ([address] F33.0    Severe obesity (BMI 35.0-39. 9) E66.01    Migraine with aura and without status migrainosus, not intractable G43. 109    Sleeping difficulty G47.9    Irritable bowel syndrome with constipation K58.1       Current Outpatient Medications   Medication Sig Dispense Refill    amoxicillin-clavulanate (AUGMENTIN) 875-125 mg per tablet Take 1 Tab by mouth every twelve (12) hours for 10 days. 20 Tab 0    fluconazole (DIFLUCAN) 150 mg tablet Take 1 Tab by mouth daily for 1 day. FDA advises cautious prescribing of oral fluconazole in pregnancy. 1 Tab 0    DULoxetine (CYMBALTA) 30 mg capsule Take 1 Cap by mouth daily.  90 Cap 0    amLODIPine (NORVASC) 5 mg tablet Take 1 Tab by mouth daily. 90 Tab 0    traZODone (DESYREL) 50 mg tablet Take 1 Tab by mouth nightly. 30 Tab 0    SUMAtriptan (IMITREX) 100 mg tablet Take one tab at HA onset, may repeat once after 2 hours. Max two tabs in 24 hours 9 Tab 1    butalbital-acetaminophen-caff (FIORICET) -40 mg per capsule Take 1 Cap by Mouth Every 4 Hours. Allergies   Allergen Reactions    Carrot Swelling     Mouth and gum swelling      Pcn [Penicillins] Other (comments)     Patient states not allergic but gets candidiasis from it.      Strawberry Hives and Swelling       Past Medical History:   Diagnosis Date    Anxiety and depression     Bruises easily     Cervical high risk human papillomavirus (HPV) DNA test positive 03/2017    rpt pap smear 3/2018    Chronic constipation     corazon, dr. Lupe Thomas    Contact dermatitis and other eczema, due to unspecified cause     Depression 12/2/2015    Essential hypertension 12/30/2015    GERD (gastroesophageal reflux disease) 9/13/2013    Gynecologic exam normal     Colwill    Headache(784.0)     Heavy menses     Hx of migraines     Irregular menses     Ovarian cyst     Pelvic pain        Social History     Socioeconomic History    Marital status: SINGLE     Spouse name: Not on file    Number of children: Not on file    Years of education: Not on file    Highest education level: Not on file   Occupational History    Not on file   Social Needs    Financial resource strain: Not on file    Food insecurity:     Worry: Not on file     Inability: Not on file    Transportation needs:     Medical: Not on file     Non-medical: Not on file   Tobacco Use    Smoking status: Current Every Day Smoker     Packs/day: 0.08     Years: 10.00     Pack years: 0.80    Smokeless tobacco: Never Used    Tobacco comment: smokes cigars - 1.5 a day   Substance and Sexual Activity    Alcohol use: Yes     Comment: occasionally    Drug use: No    Sexual activity: Not on file Lifestyle    Physical activity:     Days per week: Not on file     Minutes per session: Not on file    Stress: Not on file   Relationships    Social connections:     Talks on phone: Not on file     Gets together: Not on file     Attends [employer] service: Not on file     Active member of club or organization: Not on file     Attends meetings of clubs or organizations: Not on file     Relationship status: Not on file    Intimate partner violence:     Fear of current or ex partner: Not on file     Emotionally abused: Not on file     Physically abused: Not on file     Forced sexual activity: Not on file   Other Topics Concern    Not on file   Social History Narrative    Not on file       Family History   Problem Relation Age of Onset    Diabetes Mother     Hypertension Mother     Kidney Disease Mother         stones    Hypertension Father     Diabetes Sister     Hypertension Sister     Stroke Sister          OBJECTIVE    Physical Exam:     Visit Vitals  /86 (BP 1 Location: Left arm, BP Patient Position: Sitting)   Pulse 84   Temp 98.8 °F (37.1 °C) (Oral)   Resp 16   Ht 5' 5\" (1.651 m)   Wt 202 lb (91.6 kg)   SpO2 98%   BMI 33.61 kg/m²       General: alert, AA, in some discomfort  Neck: supple, no adenopathy palpated  CVS: normal rate, regular rhythm, distinct S1 and S2  Lungs:clear to ausculation bilaterally, no crackles, wheezing or rhonchi noted  Extremities: no edema, no cyanosis, MSK grossly normal  Skin: warm, no lesions, rashes noted  Psych:  mood and affect normal    Results for orders placed or performed in visit on 11/18/19   AMB POC RAPID STREP A   Result Value Ref Range    VALID INTERNAL CONTROL POC Yes     Group A Strep Ag Positive Negative             ASSESSMENT/PLAN  Diagnoses and all orders for this visit:    Sore throat  Positive strep   Given augmentin to cover for acute sinusitis as well    Acute non recurrent maxillary sinusitis    Recurrent depression  Needs better control  Continue care with CPA, LILA camargo  Supposed to be on trial lexapro and seroquel   Refractory to celexa/zoloft, previously on cymbalta/trazodone    Anxiety    Essential hypertension  Controlled, cont norvasc  Check cmp, lipid panel, a1c soon, reprinted labs    FABIANA on CPAP  Advised consistent use  Following dr. Dena Strong    Migraine with aura and without status migrainosus not intractable  Improved, with addressing FABIANA    Tobacco use  Discussed cessation    IBS-C  Fair control  On linzess  Discussed stress management strategies, exercise/meditation  GI following      Follow-up and Dispositions    · Return in about 3 months (around 2/18/2020), or if symptoms worsen or fail to improve, for fasting labs a week prior to your next visit, routine chronic illness care. Patient understands plan of care. Patient has provided input and agrees with goals.

## 2019-12-17 ENCOUNTER — OFFICE VISIT (OUTPATIENT)
Dept: NEUROLOGY | Age: 39
End: 2019-12-17

## 2019-12-17 VITALS
SYSTOLIC BLOOD PRESSURE: 124 MMHG | HEIGHT: 65 IN | HEART RATE: 67 BPM | OXYGEN SATURATION: 96 % | WEIGHT: 198 LBS | TEMPERATURE: 99.2 F | DIASTOLIC BLOOD PRESSURE: 80 MMHG | BODY MASS INDEX: 32.99 KG/M2

## 2019-12-17 DIAGNOSIS — G47.33 OSA (OBSTRUCTIVE SLEEP APNEA): Primary | ICD-10-CM

## 2019-12-17 DIAGNOSIS — E66.01 MORBID OBESITY (HCC): ICD-10-CM

## 2019-12-17 NOTE — PROGRESS NOTES
12/17/2019 11:55 AM    SSN: [id number]    Subjective:   79-year-old female returning for follow-up of obstructive sleep apnea, last time here in August.  At that time she had started CPAP and was doing reasonably well, after having a home sleep study showing an AHI of 8.8 with desaturations down to 79%. There was some room for improvement of compliance, which we discussed. She reports she is doing pretty good. However when we go over the download it is obvious she is not. She stopped using CPAP in September. She reports that she had some personal issues at the time that are now better, but that the main reason she does not use it is because the machine is dirty and she can't get filters from DME as they have told her she needs to use it to get filters? ?? Her download therefore through 12/15 showed 47/181 days of use with aver use when used of [id number], AHI of .15, on auto CPAP with a median CPAP at 8.4, max at 13.4, 95th at 11.9 and little leak.          Social History     Socioeconomic History    Marital status: SINGLE     Spouse name: Not on file    Number of children: Not on file    Years of education: Not on file    Highest education level: Not on file   Occupational History    Not on file   Social Needs    Financial resource strain: Not on file    Food insecurity:     Worry: Not on file     Inability: Not on file    Transportation needs:     Medical: Not on file     Non-medical: Not on file   Tobacco Use    Smoking status: Current Every Day Smoker     Packs/day: 0.08     Years: 10.00     Pack years: 0.80    Smokeless tobacco: Never Used    Tobacco comment: smokes cigars - 1.5 a day   Substance and Sexual Activity    Alcohol use: Yes     Comment: occasionally    Drug use: No    Sexual activity: Not on file   Lifestyle    Physical activity:     Days per week: Not on file     Minutes per session: Not on file    Stress: Not on file   Relationships    Social connections:     Talks on phone: Not on file     Gets together: Not on file     Attends Evangelical service: Not on file     Active member of club or organization: Not on file     Attends meetings of clubs or organizations: Not on file     Relationship status: Not on file    Intimate partner violence:     Fear of current or ex partner: Not on file     Emotionally abused: Not on file     Physically abused: Not on file     Forced sexual activity: Not on file   Other Topics Concern    Not on file   Social History Narrative    Not on file       Family History   Problem Relation Age of Onset    Diabetes Mother     Hypertension Mother     Kidney Disease Mother         stones    Hypertension Father     Diabetes Sister     Hypertension Sister     Stroke Sister        Current Outpatient Medications   Medication Sig Dispense Refill    fluticasone propionate (FLONASE) 50 mcg/actuation nasal spray 2 Sprays by Both Nostrils route daily. 1 Bottle 0    DULoxetine (CYMBALTA) 30 mg capsule Take 1 Cap by mouth daily. 90 Cap 0    amLODIPine (NORVASC) 5 mg tablet Take 1 Tab by mouth daily. 90 Tab 0    traZODone (DESYREL) 50 mg tablet Take 1 Tab by mouth nightly. 30 Tab 0    SUMAtriptan (IMITREX) 100 mg tablet Take one tab at HA onset, may repeat once after 2 hours. Max two tabs in 24 hours 9 Tab 1    butalbital-acetaminophen-caff (FIORICET) -40 mg per capsule Take 1 Cap by Mouth Every 4 Hours.          Past Medical History:   Diagnosis Date    Anxiety and depression     Bruises easily     Cervical high risk human papillomavirus (HPV) DNA test positive 03/2017    rpt pap smear 3/2018    Chronic constipation     corazon, dr. Valeria Hameed    Contact dermatitis and other eczema, due to unspecified cause     Depression 12/2/2015    Essential hypertension 12/30/2015    GERD (gastroesophageal reflux disease) 9/13/2013    Gynecologic exam normal     Colwill    Headache(784.0)     Heavy menses     Hx of migraines     Irregular menses     Ovarian cyst     Pelvic pain        Past Surgical History:   Procedure Laterality Date    HX APPENDECTOMY      HX TUBAL LIGATION         Allergies   Allergen Reactions    Carrot Swelling     Mouth and gum swelling      Pcn [Penicillins] Other (comments)     Patient states not allergic but gets candidiasis from it.  Strawberry Hives and Swelling         Vital signs:    Visit Vitals  /80 (BP 1 Location: Left arm, BP Patient Position: Sitting)   Pulse 67   Temp 99.2 °F (37.3 °C)   Ht 5' 5\" (1.651 m)   Wt 89.8 kg (198 lb)   SpO2 96%   BMI 32.95 kg/m²       Review of Systems:   GENERAL: Denies fever, reports fatigue  CARDIAC: No CP or SOB  PULMONARY: No cough of SOB  MUSCULOSKELETAL: No new joint pain  NEURO: SEE HPI      EXAM: Alert, in NAD. Heart is regular. Oriented x3, EOM's are full, PERRL, no facial asymmetries. Strength and tone are normal. DTR's +2, gait symmetric           Assessment/Plan: Mild overall obstructive sleep apnea, symptomatic, last time she was off to a great start and now non-compliant. Seems the filter issue is a Catch 22. I do not know what [facility]'s version is, but will communicate the problem and hope that she can get the filters so there is no reason for her not to comply. She expresses continued motivation. Will see her in 3 months, hopefully with this issue resolved and a download. PLEASE NOTE:   Portions of this document may have been produced using voice recognition software. Unrecognized errors in transcription may be present. This note will not be viewable in [address]

## 2019-12-17 NOTE — PATIENT INSTRUCTIONS
Learning About CPAP for Sleep Apnea  What is CPAP? CPAP is a small machine that you use at home every night while you sleep. It increases air pressure in your throat to keep your airway open. When you have sleep apnea, this can help you sleep better so you feel much better. CPAP stands for \"continuous positive airway pressure. \"  The CPAP machine will have one of the following:  · A mask that covers your nose and mouth  · Prongs that fit into your nose  · A mask that covers your nose only, the most common type. This type is called NCPAP. The N stands for \"nasal.\"  Why is it done? CPAP is usually the best treatment for obstructive sleep apnea. It is the first treatment choice and the most widely used. Your doctor may suggest CPAP if you have:  · Moderate to severe sleep apnea. · Sleep apnea and coronary artery disease (CAD). · Sleep apnea and heart failure. How does it help? · CPAP can help you have more normal sleep, so you feel less sleepy and more alert during the daytime. · CPAP may help keep heart failure or other heart problems from getting worse. · CPAP may help lower your blood pressure. · If you use CPAP, your bed partner may also sleep better because you are not snoring or restless. What are the side effects? Some people who use CPAP have:  · A dry or stuffy nose and a sore throat. · Irritated skin on the face. · Sore eyes. · Bloating. If you have any of these problems, work with your doctor to fix them. Here are some things you can try:  · Be sure the mask or nasal prongs fit well. · See if your doctor can adjust the pressure of your CPAP. · If your nose is dry, try a humidifier. · If your nose is runny or stuffy, try decongestant medicine or a steroid nasal spray. Be safe with medicines. Read and follow all instructions on the label. Do not use the medicine longer than the label says. If these things do not help, you might try a different type of machine.  Some machines have air pressure that adjusts on its own. Others have air pressures that are different when you breathe in than when you breathe out. This may reduce discomfort caused by too much pressure in your nose. Where can you learn more? Go to http://sunita-kali.info/. Enter O396 in the search box to learn more about \"Learning About CPAP for Sleep Apnea. \"  Current as of: June 9, 2019  Content Version: 12.2  © 5768-6169 [employer]. Care instructions adapted under license by [employer] (which disclaims liability or warranty for this information). If you have questions about a medical condition or this instruction, always ask your healthcare professional. [address] any warranty or liability for your use of this information.

## 2020-03-12 DIAGNOSIS — I10 ESSENTIAL HYPERTENSION: ICD-10-CM

## 2020-03-12 NOTE — TELEPHONE ENCOUNTER
This patient contacted office for the following prescriptions to be filled:    Medication requested :   Requested Prescriptions     Pending Prescriptions Disp Refills    amLODIPine (NORVASC) 5 mg tablet 90 Tab 0     Sig: Take 1 Tab by mouth daily.      PCP: dominik   [facility]  Mail order or Local pharmacy [phone]    Scheduled appointment if not seen by current providers in office: lov

## 2020-03-13 RX ORDER — AMLODIPINE BESYLATE 5 MG/1
5 TABLET ORAL DAILY
Qty: 90 TAB | Refills: 0 | Status: SHIPPED | OUTPATIENT
Start: 2020-03-13 | End: 2020-05-07

## 2020-09-07 DIAGNOSIS — I10 ESSENTIAL HYPERTENSION: ICD-10-CM

## 2020-09-08 RX ORDER — AMLODIPINE BESYLATE 5 MG/1
TABLET ORAL
Qty: 30 TAB | Refills: 0 | Status: SHIPPED | OUTPATIENT
Start: 2020-09-08 | End: 2020-10-19

## 2020-09-09 ENCOUNTER — HOSPITAL ENCOUNTER (OUTPATIENT)
Dept: MAMMOGRAPHY | Age: 40
Discharge: HOME | End: 2020-09-09
Attending: OBSTETRICS & GYNECOLOGY
Payer: COMMERCIAL

## 2020-09-09 DIAGNOSIS — Z12.31 VISIT FOR SCREENING MAMMOGRAM: ICD-10-CM

## 2020-09-09 PROCEDURE — 77063 BREAST TOMOSYNTHESIS BI: CPT

## 2020-09-18 ENCOUNTER — VIRTUAL VISIT (OUTPATIENT)
Dept: FAMILY MEDICINE CLINIC | Age: 40
End: 2020-09-18

## 2020-09-18 DIAGNOSIS — G43.109 MIGRAINE WITH AURA AND WITHOUT STATUS MIGRAINOSUS, NOT INTRACTABLE: ICD-10-CM

## 2020-09-18 DIAGNOSIS — G47.33 OSA ON CPAP: ICD-10-CM

## 2020-09-18 DIAGNOSIS — K59.09 CHRONIC CONSTIPATION: ICD-10-CM

## 2020-09-18 DIAGNOSIS — Z13.1 SCREENING FOR DIABETES MELLITUS (DM): ICD-10-CM

## 2020-09-18 DIAGNOSIS — E66.01 MORBID OBESITY (HCC): ICD-10-CM

## 2020-09-18 DIAGNOSIS — Z72.0 TOBACCO USE: ICD-10-CM

## 2020-09-18 DIAGNOSIS — Z99.89 OSA ON CPAP: ICD-10-CM

## 2020-09-18 DIAGNOSIS — I10 ESSENTIAL HYPERTENSION: Primary | ICD-10-CM

## 2020-09-18 DIAGNOSIS — F33.9 RECURRENT DEPRESSION (HCC): ICD-10-CM

## 2020-09-18 NOTE — PATIENT INSTRUCTIONS
DASH Diet: Care Instructions Your Care Instructions The DASH diet is an eating plan that can help lower your blood pressure. DASH stands for Dietary Approaches to Stop Hypertension. Hypertension is high blood pressure. The DASH diet focuses on eating foods that are high in calcium, potassium, and magnesium. These nutrients can lower blood pressure. The foods that are highest in these nutrients are fruits, vegetables, low-fat dairy products, nuts, seeds, and legumes. But taking calcium, potassium, and magnesium supplements instead of eating foods that are high in those nutrients does not have the same effect. The DASH diet also includes whole grains, fish, and poultry. The DASH diet is one of several lifestyle changes your doctor may recommend to lower your high blood pressure. Your doctor may also want you to decrease the amount of sodium in your diet. Lowering sodium while following the DASH diet can lower blood pressure even further than just the DASH diet alone. Follow-up care is a key part of your treatment and safety. Be sure to make and go to all appointments, and call your doctor if you are having problems. It's also a good idea to know your test results and keep a list of the medicines you take. How can you care for yourself at home? Following the DASH diet · Eat 4 to 5 servings of fruit each day. A serving is 1 medium-sized piece of fruit, ½ cup chopped or canned fruit, 1/4 cup dried fruit, or 4 ounces (½ cup) of fruit juice. Choose fruit more often than fruit juice. · Eat 4 to 5 servings of vegetables each day. A serving is 1 cup of lettuce or raw leafy vegetables, ½ cup of chopped or cooked vegetables, or 4 ounces (½ cup) of vegetable juice. Choose vegetables more often than vegetable juice. · Get 2 to 3 servings of low-fat and fat-free dairy each day. A serving is 8 ounces of milk, 1 cup of yogurt, or 1 ½ ounces of cheese. · Eat 6 to 8 servings of grains each day. A serving is 1 slice of bread, 1 ounce of dry cereal, or ½ cup of cooked rice, pasta, or cooked cereal. Try to choose whole-grain products as much as possible. · Limit lean meat, poultry, and fish to 2 servings each day. A serving is 3 ounces, about the size of a deck of cards. · Eat 4 to 5 servings of nuts, seeds, and legumes (cooked dried beans, lentils, and split peas) each week. A serving is 1/3 cup of nuts, 2 tablespoons of seeds, or ½ cup of cooked beans or peas. · Limit fats and oils to 2 to 3 servings each day. A serving is 1 teaspoon of vegetable oil or 2 tablespoons of salad dressing. · Limit sweets and added sugars to 5 servings or less a week. A serving is 1 tablespoon jelly or jam, ½ cup sorbet, or 1 cup of lemonade. · Eat less than 2,300 milligrams (mg) of sodium a day. If you limit your sodium to 1,500 mg a day, you can lower your blood pressure even more. Tips for success · Start small. Do not try to make dramatic changes to your diet all at once. You might feel that you are missing out on your favorite foods and then be more likely to not follow the plan. Make small changes, and stick with them. Once those changes become habit, add a few more changes. · Try some of the following: ? Make it a goal to eat a fruit or vegetable at every meal and at snacks. This will make it easy to get the recommended amount of fruits and vegetables each day. ? Try yogurt topped with fruit and nuts for a snack or healthy dessert. ? Add lettuce, tomato, cucumber, and onion to sandwiches. ? Combine a ready-made pizza crust with low-fat mozzarella cheese and lots of vegetable toppings. Try using tomatoes, squash, spinach, broccoli, carrots, cauliflower, and onions. ? Have a variety of cut-up vegetables with a low-fat dip as an appetizer instead of chips and dip. ? Sprinkle sunflower seeds or chopped almonds over salads.  Or try adding chopped walnuts or almonds to cooked vegetables. ? Try some vegetarian meals using beans and peas. Add garbanzo or kidney beans to salads. Make burritos and tacos with mashed pitts beans or black beans. Where can you learn more? Go to http://www.gray.com/ Enter H779 in the search box to learn more about \"DASH Diet: Care Instructions. \" Current as of: December 16, 2019               Content Version: 12.6 © 4645-0763 [employer]. Care instructions adapted under license by [employer] (which disclaims liability or warranty for this information). If you have questions about a medical condition or this instruction, always ask your healthcare professional. [address] any warranty or liability for your use of this information.

## 2020-09-18 NOTE — PROGRESS NOTES
Lea Quintana is a 38 y.o. female who was seen by synchronous (real-time) audio-video technology on 9/18/2020. Consent: Lea Quintana, who was seen by synchronous (real-time) audio-video technology, and/or her healthcare decision maker, is aware that this patient-initiated, Telehealth encounter on 9/18/2020 is a billable service, with coverage as determined by her insurance carrier. She is aware that she may receive a bill and has provided verbal consent to proceed: Yes. 712  Subjective:   Lea Quintana is a 38 y.o. female who was seen for No chief complaint on file. Ff-up    Have not seen pt for over a year, reports no new concerns.      Migraine headache-doing well, infrequent episodes, rarely atking imitrex. Depression/anxiety- reports to be doing well on  cymbalta.      HTN-taking norvasc. She continues to smoke. Says usually normotensive BP readings. Today 130/90    FABIANA- inconsistent with cpap use, previously following dr. Nihcole Hopkins    IBS-C- responding to linzess, reviewed GI note plan for [facility] at age 38 per current guidelines. requesting refill    Prior to Admission medications    Medication Sig Start Date End Date Taking? Authorizing Provider   linaCLOtide (Linzess) 145 mcg cap capsule Take 1 Cap by mouth Daily (before breakfast). 9/18/20  Yes Radha Cornejo MD   amLODIPine (NORVASC) 5 mg tablet TAKE 1 TABLET BY MOUTH EVERY DAY 9/8/20  Yes Ness Watts MD   fluticasone propionate (FLONASE) 50 mcg/actuation nasal spray 2 Sprays by Both Nostrils route daily. 11/18/19  Yes Radha Cornejo MD   DULoxetine (CYMBALTA) 30 mg capsule Take 1 Cap by mouth daily. 9/25/19  Yes Radha Cornejo MD   SUMAtriptan (IMITREX) 100 mg tablet Take one tab at HA onset, may repeat once after 2 hours. Max two tabs in 24 hours 3/15/19  Yes Radha Cornejo MD   butalbital-acetaminophen-caff (FIORICET) -40 mg per capsule Take 1 Cap by Mouth Every 4 Hours.  2/10/19  Yes Provider, Historical   traZODone (DESYREL) 50 mg tablet Take 1 Tab by mouth nightly. 8/9/19 9/18/20  Milka Price MD     Allergies   Allergen Reactions    Carrot Swelling     Mouth and gum swelling      Pcn [Penicillins] Other (comments)     Patient states not allergic but gets candidiasis from it.  Strawberry Hives and Swelling       Patient Active Problem List    Diagnosis Date Noted    Irritable bowel syndrome with constipation 05/16/2019    Migraine with aura and without status migrainosus, not intractable 03/15/2019    Sleeping difficulty 03/15/2019    Severe obesity (BMI 35.0-39. 9) with comorbidity ([facility] [address] 05/08/2018    Mild episode of recurrent major depressive disorder ([facility] [address] 05/08/2018    Severe obesity (BMI 35.0-39.9) 05/08/2018    Abnormal uterine bleeding (AUB) 04/17/2018    Secondary dysmenorrhea 04/17/2018    Chronic constipation 04/11/2016    Essential hypertension 12/30/2015    [facility] 12/02/2015    Tobacco use 09/13/2013    Tension headache 09/13/2013     Current Outpatient Medications   Medication Sig Dispense Refill    linaCLOtide (Linzess) 145 mcg cap capsule Take 1 Cap by mouth Daily (before breakfast). 90 Cap 0    amLODIPine (NORVASC) 5 mg tablet TAKE 1 TABLET BY MOUTH EVERY DAY 30 Tab 0    fluticasone propionate (FLONASE) 50 mcg/actuation nasal spray 2 Sprays by Both Nostrils route daily. 1 Bottle 0    DULoxetine (CYMBALTA) 30 mg capsule Take 1 Cap by mouth daily. 90 Cap 0    SUMAtriptan (IMITREX) 100 mg tablet Take one tab at HA onset, may repeat once after 2 hours. Max two tabs in 24 hours 9 Tab 1    butalbital-acetaminophen-caff (FIORICET) -40 mg per capsule Take 1 Cap by Mouth Every 4 Hours. Allergies   Allergen Reactions    Carrot Swelling     Mouth and gum swelling      Pcn [Penicillins] Other (comments)     Patient states not allergic but gets candidiasis from it.      Strawberry Hives and Swelling     Past Medical History:   Diagnosis Date    Anxiety and depression     Bruises easily     Cervical high risk human papillomavirus (HPV) DNA test positive 03/2017    rpt pap smear 3/2018    Chronic constipation     dr. Marcello chandra    Contact dermatitis and other eczema, due to unspecified cause     Depression 12/2/2015    Essential hypertension 12/30/2015    GERD (gastroesophageal reflux disease) 9/13/2013    Gynecologic exam normal     [place]    Headache(784.0)     Heavy menses     Hx of migraines     Irregular menses     Ovarian cyst     Pelvic pain      Past Surgical History:   Procedure Laterality Date    HX APPENDECTOMY      HX TUBAL LIGATION       Family History   Problem Relation Age of Onset    Diabetes Mother     Hypertension Mother     Kidney Disease Mother         stones    Hypertension Father     Diabetes Sister     Hypertension Sister     Stroke Sister      Social History     Tobacco Use    Smoking status: Current Every Day Smoker     Packs/day: 0.08     Years: 10.00     Pack years: 0.80    Smokeless tobacco: Never Used    Tobacco comment: smokes cigars - 1.5 a day   Substance Use Topics    Alcohol use: Yes     Comment: occasionally       ROS      Objective: There were no vitals taken for this visit. General: alert, cooperative, no distress   Mental  status: normal mood, behavior, speech, dress, motor activity, and thought processes, able to follow commands   HENT: NCAT   Neck: no visualized mass   Resp: no respiratory distress   Neuro: no gross deficits   Skin: no discoloration or lesions of concern on visible areas   Psychiatric: normal affect, consistent with stated mood, no evidence of hallucinations     Additional exam findings: We discussed the expected course, resolution and complications of the diagnosis(es) in detail. Medication risks, benefits, costs, interactions, and alternatives were discussed as indicated.   I advised her to contact the office if her condition worsens, changes or fails to improve as anticipated. She expressed understanding with the diagnosis(es) and plan. Leonard Gandhi is a 38 y.o. female who was evaluated by a video visit encounter for concerns as above. Patient identification was verified prior to start of the visit. A caregiver was present when appropriate. Due to this being a TeleHealth encounter (During [place]-17 public health emergency), evaluation of the following organ systems was limited: Vitals/Constitutional/EENT/Resp/CV/GI//MS/Neuro/Skin/Heme-Lymph-Imm. Pursuant to the emergency declaration under the [facility], [facility] waiver authority and the [employer] and [employer] Act, this Virtual  Visit was conducted, with patient's (and/or legal guardian's) consent, to reduce the patient's risk of exposure to COVID-19 and provide necessary medical care. Services were provided through a video synchronous discussion virtually to substitute for in-person clinic visit. Patient and provider were located at their individual homes. Assessment & Plan:   Diagnoses and all orders for this visit:    1. Essential hypertension  Mildly elevated DBP on home reading today  Cont norvasc 5 mg for now, DASH diet, monitoring  In person visit in 4 week  -     METABOLIC PANEL, COMPREHENSIVE; Future  -     LIPID PANEL; Future    2. Recurrent depression (HCC)  Stable  Cont cymbalta    3. Morbid obesity ([address]    4. FABIANA on CPAP  Advised consistent use  Establish care with new sleep specialist       REFERRAL TO SLEEP STUDIES    5. Migraine with aura and without status migrainosus, not intractable  Stable  Cont prn imitrex    6. Tobacco use  Encouraged cessation    7. Screening for diabetes mellitus (DM)  -     HEMOGLOBIN A1C W/O EAG; Future  -     METABOLIC PANEL, COMPREHENSIVE; Future    8.  Chronic constipation  Responding to linzess, prev GI eval plan on colonoscopy at age 38  -     linaCLOtide (Linzess) 145 mcg cap capsule; Take 1 Cap by mouth Daily (before breakfast).           Jacques Chopra MD

## 2020-10-06 DIAGNOSIS — G47.33 OSA (OBSTRUCTIVE SLEEP APNEA): Primary | ICD-10-CM

## 2020-10-18 DIAGNOSIS — I10 ESSENTIAL HYPERTENSION: ICD-10-CM

## 2020-10-19 RX ORDER — AMLODIPINE BESYLATE 5 MG/1
TABLET ORAL
Qty: 30 TAB | Refills: 0 | Status: SHIPPED | OUTPATIENT
Start: 2020-10-19 | End: 2020-11-16

## 2020-11-03 ENCOUNTER — HOSPITAL ENCOUNTER (OUTPATIENT)
Dept: LAB | Age: 40
Discharge: HOME | End: 2020-11-03

## 2020-11-03 LAB
XX-LABCORP SPECIMEN COL,LCBCF: NORMAL
XX-LABCORP SPECIMEN COL,LCBCF: NORMAL

## 2020-11-03 PROCEDURE — 99001 SPECIMEN HANDLING PT-LAB: CPT

## 2020-11-04 LAB
ALBUMIN SERPL-MCNC: 3.9 G/DL (ref 4–5)
ALBUMIN/GLOB SERPL: 1.3 {RATIO} (ref 1.2–2.2)
ALP SERPL-CCNC: 117 IU/L (ref 39–117)
ALT SERPL-CCNC: 19 IU/L (ref 0–44)
AST SERPL-CCNC: 21 IU/L (ref 0–40)
BILIRUB SERPL-MCNC: 0.2 MG/DL (ref 0–1.2)
BUN SERPL-MCNC: 8 MG/DL (ref 6–24)
BUN/CREAT SERPL: 15 (ref 9–20)
CALCIUM SERPL-MCNC: 9.2 MG/DL (ref 8.7–10.2)
CHLORIDE SERPL-SCNC: 102 MMOL/L (ref 96–106)
CHOLEST SERPL-MCNC: 125 MG/DL (ref 100–199)
CO2 SERPL-SCNC: 22 MMOL/L (ref 20–29)
CREAT SERPL-MCNC: 0.55 MG/DL (ref 0.76–1.27)
FERRITIN SERPL-MCNC: 163 NG/ML (ref 30–400)
FOLATE SERPL-MCNC: 4.8 NG/ML
GLOBULIN SER CALC-MCNC: 3 G/DL (ref 1.5–4.5)
GLUCOSE SERPL-MCNC: 76 MG/DL (ref 65–99)
HBA1C MFR BLD: 5.6 % (ref 4.8–5.6)
HDLC SERPL-MCNC: 40 MG/DL
INTERPRETATION, 910389: NORMAL
LDLC SERPL CALC-MCNC: 70 MG/DL (ref 0–99)
POTASSIUM SERPL-SCNC: 4.5 MMOL/L (ref 3.5–5.2)
PROT SERPL-MCNC: 6.9 G/DL (ref 6–8.5)
SODIUM SERPL-SCNC: 140 MMOL/L (ref 134–144)
SPECIMEN STATUS REPORT, ROLRST: NORMAL
TRIGL SERPL-MCNC: 74 MG/DL (ref 0–149)
VIT B12 SERPL-MCNC: 656 PG/ML (ref 232–1245)
VLDLC SERPL CALC-MCNC: 15 MG/DL (ref 5–40)

## 2020-11-06 NOTE — PROGRESS NOTES
Labs look good, including DM/cholesterol checks  She is due for CPE, pls sched and will discuss in detail

## 2020-11-18 NOTE — PROGRESS NOTES
Patient identified with 2 identifiers (name and ).   Patient aware of Labs look good, including DM/cholesterol checks

## 2020-11-23 ENCOUNTER — HOSPITAL ENCOUNTER (OUTPATIENT)
Dept: ULTRASOUND IMAGING | Age: 40
Discharge: HOME | End: 2020-11-23
Attending: OBSTETRICS & GYNECOLOGY
Payer: COMMERCIAL

## 2020-11-23 ENCOUNTER — HOSPITAL ENCOUNTER (OUTPATIENT)
Dept: MAMMOGRAPHY | Age: 40
Discharge: HOME | End: 2020-11-23
Attending: OBSTETRICS & GYNECOLOGY
Payer: COMMERCIAL

## 2020-11-23 ENCOUNTER — VIRTUAL VISIT (OUTPATIENT)
Dept: FAMILY MEDICINE CLINIC | Age: 40
End: 2020-11-23
Payer: COMMERCIAL

## 2020-11-23 DIAGNOSIS — K58.1 IRRITABLE BOWEL SYNDROME WITH CONSTIPATION: ICD-10-CM

## 2020-11-23 DIAGNOSIS — E66.01 SEVERE OBESITY (BMI 35.0-39.9) WITH COMORBIDITY (HCC): ICD-10-CM

## 2020-11-23 DIAGNOSIS — N63.0 BREAST MASS: ICD-10-CM

## 2020-11-23 DIAGNOSIS — F33.9 RECURRENT DEPRESSION (HCC): ICD-10-CM

## 2020-11-23 DIAGNOSIS — K59.09 CHRONIC CONSTIPATION: ICD-10-CM

## 2020-11-23 DIAGNOSIS — Z72.0 TOBACCO USE: ICD-10-CM

## 2020-11-23 DIAGNOSIS — G43.109 MIGRAINE WITH AURA AND WITHOUT STATUS MIGRAINOSUS, NOT INTRACTABLE: ICD-10-CM

## 2020-11-23 DIAGNOSIS — R92.8 ABNORMAL MAMMOGRAM: ICD-10-CM

## 2020-11-23 DIAGNOSIS — I10 ESSENTIAL HYPERTENSION: Primary | ICD-10-CM

## 2020-11-23 PROCEDURE — 76642 ULTRASOUND BREAST LIMITED: CPT

## 2020-11-23 PROCEDURE — 77061 BREAST TOMOSYNTHESIS UNI: CPT

## 2020-11-23 PROCEDURE — 99214 OFFICE O/P EST MOD 30 MIN: CPT | Performed by: FAMILY MEDICINE

## 2020-11-23 NOTE — PROGRESS NOTES
Benjamin Fields is a 38 y.o. female who was seen by synchronous (real-time) audio-video technology on 11/23/2020. Consent: Benjamin Fields, who was seen by synchronous (real-time) audio-video technology, and/or her healthcare decision maker, is aware that this patient-initiated, Telehealth encounter on 11/23/2020 is a billable service, with coverage as determined by her insurance carrier. She is aware that she may receive a bill and has provided verbal consent to proceed: Yes. 712  Subjective:   Benjamin Fields is a 38 y.o. female who was seen for No chief complaint on file. Ff-up    Migraine headache-doing well, infrequent episodes, rarely taking imitrex. Depression/anxiety- reports to be doing well on  cymbalta.      HTN-taking norvasc. She continues to smoke. Says has not been checking BP at home. FABIANA-more consistent w/ cpap use, now following dr. Nav Haque    IBS-C- responding to linzess, reviewed GI note plan for [facility] at age 38 per current guidelines. requesting refill    Prior to Admission medications    Medication Sig Start Date End Date Taking? Authorizing Provider   linaCLOtide (Linzess) 145 mcg cap capsule Take 1 Cap by mouth Daily (before breakfast). 11/23/20  Yes Slade Duong MD   amLODIPine (NORVASC) 5 mg tablet TAKE 1 TABLET BY MOUTH EVERY DAY 11/16/20  Yes Christian Watts MD   fluticasone propionate (FLONASE) 50 mcg/actuation nasal spray 2 Sprays by Both Nostrils route daily. 11/18/19  Yes Slade Duong MD   DULoxetine (CYMBALTA) 30 mg capsule Take 1 Cap by mouth daily. 9/25/19  Yes Slade Duong MD   SUMAtriptan (IMITREX) 100 mg tablet Take one tab at HA onset, may repeat once after 2 hours. Max two tabs in 24 hours 3/15/19  Yes Slade Duong MD   butalbital-acetaminophen-caff (FIORICET) -40 mg per capsule Take 1 Cap by Mouth Every 4 Hours.  2/10/19  Yes Provider, Historical   linaCLOtide (Linzess) 145 mcg cap capsule Take 1 Cap by mouth Daily (before breakfast). 9/18/20 11/23/20  Sai Perez MD     Allergies   Allergen Reactions    Carrot Swelling     Mouth and gum swelling      Pcn [Penicillins] Other (comments)     Patient states not allergic but gets candidiasis from it.  Strawberry Hives and Swelling       Patient Active Problem List    Diagnosis Date Noted    Irritable bowel syndrome with constipation 05/16/2019    Migraine with aura and without status migrainosus, not intractable 03/15/2019    Sleeping difficulty 03/15/2019    Severe obesity (BMI 35.0-39. 9) with comorbidity ([address] 05/08/2018    Mild episode of recurrent major depressive disorder ([address] 05/08/2018    Severe obesity (BMI 35.0-39.9) 05/08/2018    Abnormal uterine bleeding (AUB) 04/17/2018    Secondary dysmenorrhea 04/17/2018    Chronic constipation 04/11/2016    Essential hypertension 12/30/2015    Recurrent depression ([facility] [address] 12/02/2015    Tobacco use 09/13/2013    Tension headache 09/13/2013     Current Outpatient Medications   Medication Sig Dispense Refill    linaCLOtide (Linzess) 145 mcg cap capsule Take 1 Cap by mouth Daily (before breakfast). 90 Cap 3    amLODIPine (NORVASC) 5 mg tablet TAKE 1 TABLET BY MOUTH EVERY DAY 90 Tab 0    fluticasone propionate (FLONASE) 50 mcg/actuation nasal spray 2 Sprays by Both Nostrils route daily. 1 Bottle 0    DULoxetine (CYMBALTA) 30 mg capsule Take 1 Cap by mouth daily. 90 Cap 0    SUMAtriptan (IMITREX) 100 mg tablet Take one tab at HA onset, may repeat once after 2 hours. Max two tabs in 24 hours 9 Tab 1    butalbital-acetaminophen-caff (FIORICET) -40 mg per capsule Take 1 Cap by Mouth Every 4 Hours. Allergies   Allergen Reactions    Carrot Swelling     Mouth and gum swelling      Pcn [Penicillins] Other (comments)     Patient states not allergic but gets candidiasis from it.      Strawberry Hives and Swelling     Past Medical History:   Diagnosis Date    Anxiety and depression     Bruises easily  Cervical high risk human papillomavirus (HPV) DNA test positive 03/2017    rpt pap smear 3/2018    Chronic constipation     corazon, dr. Kwame Newberry    Contact dermatitis and other eczema, due to unspecified cause     Depression 12/2/2015    Essential hypertension 12/30/2015    GERD (gastroesophageal reflux disease) 9/13/2013    Gynecologic exam normal     [place]    Headache(784.0)     Heavy menses     Hx of migraines     Irregular menses     Ovarian cyst     Pelvic pain      Past Surgical History:   Procedure Laterality Date    HX APPENDECTOMY      HX TUBAL LIGATION       Family History   Problem Relation Age of Onset    Diabetes Mother     Hypertension Mother     Kidney Disease Mother         stones    Hypertension Father     Diabetes Sister     Hypertension Sister     Stroke Sister      Social History     Tobacco Use    Smoking status: Current Every Day Smoker     Packs/day: 0.08     Years: 10.00     Pack years: 0.80    Smokeless tobacco: Never Used    Tobacco comment: smokes cigars - 1.5 a day   Substance Use Topics    Alcohol use: Yes     Comment: occasionally       ROS      Objective: There were no vitals taken for this visit. General: alert, cooperative, no distress   Mental  status: normal mood, behavior, speech, dress, motor activity, and thought processes, able to follow commands   HENT: NCAT   Neck: no visualized mass   Resp: no respiratory distress   Neuro: no gross deficits   Skin: no discoloration or lesions of concern on visible areas   Psychiatric: normal affect, consistent with stated mood, no evidence of hallucinations     Additional exam findings: We discussed the expected course, resolution and complications of the diagnosis(es) in detail. Medication risks, benefits, costs, interactions, and alternatives were discussed as indicated. I advised her to contact the office if her condition worsens, changes or fails to improve as anticipated.  She expressed understanding with the diagnosis(es) and plan. Lolis Kwon is a 38 y.o. female who was evaluated by a video visit encounter for concerns as above. Patient identification was verified prior to start of the visit. A caregiver was present when appropriate. Due to this being a TeleHealth encounter (During [facility] public health emergency), evaluation of the following organ systems was limited: Vitals/Constitutional/EENT/Resp/CV/GI//MS/Neuro/Skin/Heme-Lymph-Imm. Pursuant to the emergency declaration under the [facility], [facility] waiver authority and the [employer] Act, this Virtual  Visit was conducted, with patient's (and/or legal guardian's) consent, to reduce the patient's risk of exposure to COVID-19 and provide necessary medical care. Services were provided through a video synchronous discussion virtually to substitute for in-person clinic visit. Patient and provider were located at their individual homes. Results for orders placed or performed during the hospital encounter of 11/03/20   [facility] SPECIMEN COL   Result Value Ref Range    [facility] SPEC[facility]. Specimens collected/sent to [facility]. Please direct inquiries to ([phone]). [facility] SPECIMEN COL   Result Value Ref Range    [facility]. Specimens collected/sent to [facility]. Please direct inquiries to ([phone]). Assessment & Plan:   Diagnoses and all orders for this visit:    1. Essential hypertension  Advised to check bp at home  Cont norvasc 5 mg, DASH diet, monitoring    2. Recurrent depression (HCC)  Stable  Cont cymbalta    3. Morbid obesity ([facility] [address]    4. FABIANA on CPAP  Following dr. Hever Guaman    5. Migraine with aura and without status migrainosus, not intractable  Stable  Cont prn imitrex    6. Tobacco use  Encouraged cessation    7. IBS-C    8.  Chronic constipation  Responding to linzess, prev GI eval plan on colonoscopy at age 38  -     linaCLOtide (Linzess) 145 mcg cap capsule; Take 1 Cap by mouth Daily (before breakfast).       Ff-up in 3 months plan on CPE then    Taj Hull MD

## 2020-11-23 NOTE — PATIENT INSTRUCTIONS
DASH Diet: Care Instructions Your Care Instructions The DASH diet is an eating plan that can help lower your blood pressure. DASH stands for Dietary Approaches to Stop Hypertension. Hypertension is high blood pressure. The DASH diet focuses on eating foods that are high in calcium, potassium, and magnesium. These nutrients can lower blood pressure. The foods that are highest in these nutrients are fruits, vegetables, low-fat dairy products, nuts, seeds, and legumes. But taking calcium, potassium, and magnesium supplements instead of eating foods that are high in those nutrients does not have the same effect. The DASH diet also includes whole grains, fish, and poultry. The DASH diet is one of several lifestyle changes your doctor may recommend to lower your high blood pressure. Your doctor may also want you to decrease the amount of sodium in your diet. Lowering sodium while following the DASH diet can lower blood pressure even further than just the DASH diet alone. Follow-up care is a key part of your treatment and safety. Be sure to make and go to all appointments, and call your doctor if you are having problems. It's also a good idea to know your test results and keep a list of the medicines you take. How can you care for yourself at home? Following the DASH diet · Eat 4 to 5 servings of fruit each day. A serving is 1 medium-sized piece of fruit, ½ cup chopped or canned fruit, 1/4 cup dried fruit, or 4 ounces (½ cup) of fruit juice. Choose fruit more often than fruit juice. · Eat 4 to 5 servings of vegetables each day. A serving is 1 cup of lettuce or raw leafy vegetables, ½ cup of chopped or cooked vegetables, or 4 ounces (½ cup) of vegetable juice. Choose vegetables more often than vegetable juice. · Get 2 to 3 servings of low-fat and fat-free dairy each day. A serving is 8 ounces of milk, 1 cup of yogurt, or 1 ½ ounces of cheese. · Eat 6 to 8 servings of grains each day. A serving is 1 slice of bread, 1 ounce of dry cereal, or ½ cup of cooked rice, pasta, or cooked cereal. Try to choose whole-grain products as much as possible. · Limit lean meat, poultry, and fish to 2 servings each day. A serving is 3 ounces, about the size of a deck of cards. · Eat 4 to 5 servings of nuts, seeds, and legumes (cooked dried beans, lentils, and split peas) each week. A serving is 1/3 cup of nuts, 2 tablespoons of seeds, or ½ cup of cooked beans or peas. · Limit fats and oils to 2 to 3 servings each day. A serving is 1 teaspoon of vegetable oil or 2 tablespoons of salad dressing. · Limit sweets and added sugars to 5 servings or less a week. A serving is 1 tablespoon jelly or jam, ½ cup sorbet, or 1 cup of lemonade. · Eat less than 2,300 milligrams (mg) of sodium a day. If you limit your sodium to 1,500 mg a day, you can lower your blood pressure even more. Tips for success · Start small. Do not try to make dramatic changes to your diet all at once. You might feel that you are missing out on your favorite foods and then be more likely to not follow the plan. Make small changes, and stick with them. Once those changes become habit, add a few more changes. · Try some of the following: ? Make it a goal to eat a fruit or vegetable at every meal and at snacks. This will make it easy to get the recommended amount of fruits and vegetables each day. ? Try yogurt topped with fruit and nuts for a snack or healthy dessert. ? Add lettuce, tomato, cucumber, and onion to sandwiches. ? Combine a ready-made pizza crust with low-fat mozzarella cheese and lots of vegetable toppings. Try using tomatoes, squash, spinach, broccoli, carrots, cauliflower, and onions. ? Have a variety of cut-up vegetables with a low-fat dip as an appetizer instead of chips and dip. ? Sprinkle sunflower seeds or chopped almonds over salads.  Or try adding chopped walnuts or almonds to cooked vegetables. ? Try some vegetarian meals using beans and peas. Add garbanzo or kidney beans to salads. Make burritos and tacos with mashed pitts beans or black beans. Where can you learn more? Go to http://www.gray.com/ Enter G230 in the search box to learn more about \"DASH Diet: Care Instructions. \" Current as of: December 16, 2019               Content Version: 12.6 © 5377-8279 [employer]. Care instructions adapted under license by [employer] (which disclaims liability or warranty for this information). If you have questions about a medical condition or this instruction, always ask your healthcare professional. [address] any warranty or liability for your use of this information.

## 2021-01-19 LAB — SARS-COV-2, NAA: DETECTED

## 2021-02-12 ENCOUNTER — VIRTUAL VISIT (OUTPATIENT)
Dept: FAMILY MEDICINE CLINIC | Age: 41
End: 2021-02-12
Payer: COMMERCIAL

## 2021-02-12 DIAGNOSIS — J01.90 ACUTE NON-RECURRENT SINUSITIS, UNSPECIFIED LOCATION: Primary | ICD-10-CM

## 2021-02-12 DIAGNOSIS — Z72.0 TOBACCO USE: ICD-10-CM

## 2021-02-12 DIAGNOSIS — E66.01 SEVERE OBESITY (BMI 35.0-39.9) WITH COMORBIDITY (HCC): ICD-10-CM

## 2021-02-12 DIAGNOSIS — G43.109 MIGRAINE WITH AURA AND WITHOUT STATUS MIGRAINOSUS, NOT INTRACTABLE: ICD-10-CM

## 2021-02-12 DIAGNOSIS — N60.01 CYST OF RIGHT BREAST: ICD-10-CM

## 2021-02-12 DIAGNOSIS — I10 ESSENTIAL HYPERTENSION: ICD-10-CM

## 2021-02-12 DIAGNOSIS — F33.9 RECURRENT DEPRESSION (HCC): ICD-10-CM

## 2021-02-12 DIAGNOSIS — K58.1 IRRITABLE BOWEL SYNDROME WITH CONSTIPATION: ICD-10-CM

## 2021-02-12 PROCEDURE — 99214 OFFICE O/P EST MOD 30 MIN: CPT | Performed by: FAMILY MEDICINE

## 2021-02-12 RX ORDER — AZITHROMYCIN 250 MG/1
TABLET, FILM COATED ORAL
Qty: 6 TAB | Refills: 0 | Status: SHIPPED | OUTPATIENT
Start: 2021-02-12 | End: 2022-03-31

## 2021-02-12 RX ORDER — FLUTICASONE PROPIONATE 50 MCG
2 SPRAY, SUSPENSION (ML) NASAL DAILY
Qty: 1 BOTTLE | Refills: 2 | Status: SHIPPED | OUTPATIENT
Start: 2021-02-12 | End: 2022-03-31 | Stop reason: SDUPTHER

## 2021-02-12 NOTE — PROGRESS NOTES
Marielle Sherman is a 38 y.o. female who was seen by synchronous (real-time) audio-video technology on 2/12/2021. Consent: Marielle Sherman, who was seen by synchronous (real-time) audio-video technology, and/or her healthcare decision maker, is aware that this patient-initiated, Telehealth encounter on 2/12/2021 is a billable service, with coverage as determined by her insurance carrier. She is aware that she may receive a bill and has provided verbal consent to proceed: Yes. 712  Subjective:   Marielle Sherman is a 38 y.o. female who was seen for Positive For Covid-19 (01/16/2021 Seen at [facility] ), Pressure Behind the Eyes (feels like her sinus are full ), Nasal Congestion, and Ear Pain (bilat on and off/ left ear pain started yesterday )  Ff-up    Migraine headache-doing well, infrequent episodes, rarely taking imitrex. Depression/anxiety- reports to be doing well on  cymbalta.      HTN-taking norvasc. She continues to smoke. Says has not been checking BP at home. FABIANA-now following dr. Keven Villalobos, says has yet to hear about about CPAP settings, advised to call their office    IBS-C- responding to linzess, reviewed GI note plan for [facility] at age 38 per current guidelines. requesting refill    Sinus pressure, bilateral ear fullness since 1/16, tested positive for covid with loss of sense of taste/smell as well. Says has regained senses however the rhinitis/sinus pressure/ear pressure are persistent. Afebrile throughout, back to working. Prior to Admission medications    Medication Sig Start Date End Date Taking? Authorizing Provider   acetaminophen (TYLENOL PO) Take 500 mg by mouth every four (4) hours as needed. Yes Provider, Historical   azithromycin (ZITHROMAX) 250 mg tablet Take two tablets today then one tablet daily 2/12/21  Yes Raquel Watts MD   fluticasone propionate (FLONASE) 50 mcg/actuation nasal spray 2 Sprays by Both Nostrils route daily.  2/12/21  Yes Lucy Dempsey Keren PUENTE MD   amLODIPine (NORVASC) 5 mg tablet TAKE 1 TABLET BY MOUTH EVERY DAY 1/7/21  Yes Miguel Watts MD   linaCLOtide (Linzess) 145 mcg cap capsule Take 1 Cap by mouth Daily (before breakfast). 11/23/20  Yes Caroline Montiel MD   DULoxetine (CYMBALTA) 30 mg capsule Take 1 Cap by mouth daily. 9/25/19  Yes Caroline Montiel MD   fluticasone propionate (FLONASE) 50 mcg/actuation nasal spray 2 Sprays by Both Nostrils route daily. 11/18/19 2/12/21  Caroline Montiel MD   SUMAtriptan (IMITREX) 100 mg tablet Take one tab at HA onset, may repeat once after 2 hours. Max two tabs in 24 hours 3/15/19   Caroline Montiel MD   butalbital-acetaminophen-caff (FIORICET) -40 mg per capsule Take 1 Cap by Mouth Every 4 Hours. 2/10/19   Provider, Historical     Allergies   Allergen Reactions    Carrot Swelling     Mouth and gum swelling      Pcn [Penicillins] Other (comments)     Patient states not allergic but gets candidiasis from it.  Strawberry Hives and Swelling       Patient Active Problem List    Diagnosis Date Noted    Irritable bowel syndrome with constipation 05/16/2019    Migraine with aura and without status migrainosus, not intractable 03/15/2019    Sleeping difficulty 03/15/2019    Severe obesity (BMI 35.0-39. 9) with comorbidity ([address] 05/08/2018    Mild episode of recurrent major depressive disorder ([address] 05/08/2018    Severe obesity (BMI 35.0-39.9) 05/08/2018    Abnormal uterine bleeding (AUB) 04/17/2018    Secondary dysmenorrhea 04/17/2018    Chronic constipation 04/11/2016    Essential hypertension 12/30/2015    Recurrent depression ([address] 12/02/2015    Tobacco use 09/13/2013    Tension headache 09/13/2013     Current Outpatient Medications   Medication Sig Dispense Refill    acetaminophen (TYLENOL PO) Take 500 mg by mouth every four (4) hours as needed.       azithromycin (ZITHROMAX) 250 mg tablet Take two tablets today then one tablet daily 6 Tab 0    fluticasone propionate (FLONASE) 50 mcg/actuation nasal spray 2 Sprays by Both Nostrils route daily. 1 Bottle 2    amLODIPine (NORVASC) 5 mg tablet TAKE 1 TABLET BY MOUTH EVERY DAY 90 Tab 0    linaCLOtide (Linzess) 145 mcg cap capsule Take 1 Cap by mouth Daily (before breakfast). 90 Cap 3    DULoxetine (CYMBALTA) 30 mg capsule Take 1 Cap by mouth daily. 90 Cap 0    SUMAtriptan (IMITREX) 100 mg tablet Take one tab at HA onset, may repeat once after 2 hours. Max two tabs in 24 hours 9 Tab 1    butalbital-acetaminophen-caff (FIORICET) -40 mg per capsule Take 1 Cap by Mouth Every 4 Hours. Allergies   Allergen Reactions    Carrot Swelling     Mouth and gum swelling      Pcn [Penicillins] Other (comments)     Patient states not allergic but gets candidiasis from it.      Strawberry Hives and Swelling     Past Medical History:   Diagnosis Date    Anxiety and depression     Bruises easily     Cervical high risk human papillomavirus (HPV) DNA test positive 03/2017    rpt pap smear 3/2018    Chronic constipation     coraz[place], dr. Pramod Davis    Contact dermatitis and other eczema, due to unspecified cause     Depression 12/2/2015    Essential hypertension 12/30/2015    GERD (gastroesophageal reflux disease) 9/13/2013    Gynecologic exam normal     [place]    Headache(784.0)     Heavy menses     Hx of migraines     Irregular menses     Ovarian cyst     Pelvic pain      Past Surgical History:   Procedure Laterality Date    HX APPENDECTOMY      HX TUBAL LIGATION       Family History   Problem Relation Age of Onset    Diabetes Mother     Hypertension Mother     Kidney Disease Mother         stones    Hypertension Father     Diabetes Sister     Hypertension Sister     Stroke Sister      Social History     Tobacco Use    Smoking status: Current Every Day Smoker     Packs/day: 0.08     Years: 10.00     Pack years: 0.80    Smokeless tobacco: Never Used    Tobacco comment: smokes cigars - 1.5 a day   Substance Use Topics    Alcohol use: Yes     Comment: occasionally       ROS      Objective: There were no vitals taken for this visit. General: alert, cooperative, no distress   Mental  status: normal mood, behavior, speech, dress, motor activity, and thought processes, able to follow commands   HENT: NCAT   Neck: no visualized mass   Resp: no respiratory distress   Neuro: no gross deficits   Skin: no discoloration or lesions of concern on visible areas   Psychiatric: normal affect, consistent with stated mood, no evidence of hallucinations     Additional exam findings: We discussed the expected course, resolution and complications of the diagnosis(es) in detail. Medication risks, benefits, costs, interactions, and alternatives were discussed as indicated. I advised her to contact the office if her condition worsens, changes or fails to improve as anticipated. She expressed understanding with the diagnosis(es) and plan. Tiki Mitchell is a 38 y.o. female who was evaluated by a video visit encounter for concerns as above. Patient identification was verified prior to start of the visit. A caregiver was present when appropriate. Due to this being a TeleHealth encounter (During [facility] public health emergency), evaluation of the following organ systems was limited: Vitals/Constitutional/EENT/Resp/CV/GI//MS/Neuro/Skin/Heme-Lymph-Imm. Pursuant to the emergency declaration under the [facility], 1135 waiver authority and the [employer] and [employer] Act, this Virtual  Visit was conducted, with patient's (and/or legal guardian's) consent, to reduce the patient's risk of exposure to COVID-19 and provide necessary medical care. Services were provided through a video synchronous discussion virtually to substitute for in-person clinic visit. Patient and provider were located at their individual homes.     Results for orders placed or performed during the hospital encounter of 11/03/20   [facility] SPECIMEN COL   Result Value Ref Range    [facility]. Specimens collected/sent to [facility]. Please direct inquiries to ([phone]). [facility] SPECIMEN COL   Result Value Ref Range    [facility]. Specimens collected/sent to [facility]. Please direct inquiries to ([phone]). Assessment & Plan:   Diagnoses and all orders for this visit:    1. Essential hypertension  Advised to check bp at home  Cont norvasc 5 mg, DASH diet, monitoring  In person visit on ff-up    2. Recurrent depression (HCC)  Stable  Cont cymbalta    3. Morbid obesity ([address]    4. FABIANA on CPAP  Following dr. Yecenia Mcguire    5. Migraine with aura and without status migrainosus, not intractable  Stable  Cont prn imitrex    6. Tobacco use  Encouraged cessation    7. IBS-C    8. Chronic constipation  Responding to linzess, prev GI eval plan on colonoscopy at age 38  -     linaCLOtide (Linzess) 145 mcg cap capsule; Take 1 Cap by mouth Daily (before breakfast). 9. Acute non recurrent sinusitis, unspecified location  Start flonase, zyrtec (PCN allergic)    10.  Right breast cyst  On mammogram prob benign 11/2020 with 6 month ff-up recommended  Order placed for dx mammo/US due May    Ff-up in 3 months plan on in person visit    Chalo Bobo MD

## 2021-05-12 DIAGNOSIS — N60.01 CYST OF RIGHT BREAST: ICD-10-CM

## 2021-05-24 ENCOUNTER — HOSPITAL ENCOUNTER (OUTPATIENT)
Dept: MAMMOGRAPHY | Age: 41
Discharge: HOME | End: 2021-05-24
Attending: FAMILY MEDICINE
Payer: COMMERCIAL

## 2021-05-24 ENCOUNTER — HOSPITAL ENCOUNTER (OUTPATIENT)
Dept: ULTRASOUND IMAGING | Age: 41
Discharge: HOME | End: 2021-05-24
Attending: FAMILY MEDICINE
Payer: COMMERCIAL

## 2021-05-24 DIAGNOSIS — N60.01 CYST OF RIGHT BREAST: ICD-10-CM

## 2021-05-24 PROCEDURE — 76642 ULTRASOUND BREAST LIMITED: CPT

## 2022-03-18 PROBLEM — N94.5 SECONDARY DYSMENORRHEA: Status: ACTIVE | Noted: 2018-04-17

## 2022-03-19 PROBLEM — K58.1 IRRITABLE BOWEL SYNDROME WITH CONSTIPATION: Status: ACTIVE | Noted: 2019-05-16

## 2022-03-19 PROBLEM — N93.9 ABNORMAL UTERINE BLEEDING (AUB): Status: ACTIVE | Noted: 2018-04-17

## 2022-03-19 PROBLEM — G47.9 SLEEPING DIFFICULTY: Status: ACTIVE | Noted: 2019-03-15

## 2022-03-19 PROBLEM — G43.109 MIGRAINE WITH AURA AND WITHOUT STATUS MIGRAINOSUS, NOT INTRACTABLE: Status: ACTIVE | Noted: 2019-03-15

## 2022-03-19 PROBLEM — F33.0 MILD EPISODE OF RECURRENT MAJOR DEPRESSIVE DISORDER (HCC): Status: ACTIVE | Noted: 2018-05-08

## 2022-03-20 PROBLEM — E66.01 SEVERE OBESITY (BMI 35.0-39.9) WITH COMORBIDITY (HCC): Status: ACTIVE | Noted: 2018-05-08

## 2022-03-31 ENCOUNTER — OFFICE VISIT (OUTPATIENT)
Dept: FAMILY MEDICINE CLINIC | Age: 42
End: 2022-03-31
Payer: COMMERCIAL

## 2022-03-31 VITALS
WEIGHT: 219.8 LBS | DIASTOLIC BLOOD PRESSURE: 86 MMHG | SYSTOLIC BLOOD PRESSURE: 134 MMHG | RESPIRATION RATE: 16 BRPM | BODY MASS INDEX: 36.58 KG/M2 | TEMPERATURE: 97.6 F | HEART RATE: 77 BPM | OXYGEN SATURATION: 99 %

## 2022-03-31 DIAGNOSIS — F33.9 RECURRENT DEPRESSION (HCC): ICD-10-CM

## 2022-03-31 DIAGNOSIS — Z13.1 SCREENING FOR DIABETES MELLITUS (DM): ICD-10-CM

## 2022-03-31 DIAGNOSIS — Z72.0 TOBACCO USE: ICD-10-CM

## 2022-03-31 DIAGNOSIS — J30.1 SEASONAL ALLERGIC RHINITIS DUE TO POLLEN: ICD-10-CM

## 2022-03-31 DIAGNOSIS — K58.1 IRRITABLE BOWEL SYNDROME WITH CONSTIPATION: ICD-10-CM

## 2022-03-31 DIAGNOSIS — H53.8 BLURRY VISION: ICD-10-CM

## 2022-03-31 DIAGNOSIS — G43.109 MIGRAINE WITH AURA AND WITHOUT STATUS MIGRAINOSUS, NOT INTRACTABLE: ICD-10-CM

## 2022-03-31 DIAGNOSIS — I10 ESSENTIAL HYPERTENSION: ICD-10-CM

## 2022-03-31 DIAGNOSIS — K59.09 CHRONIC CONSTIPATION: ICD-10-CM

## 2022-03-31 DIAGNOSIS — E66.01 SEVERE OBESITY (BMI 35.0-39.9) WITH COMORBIDITY (HCC): ICD-10-CM

## 2022-03-31 DIAGNOSIS — R35.0 URINE FREQUENCY: Primary | ICD-10-CM

## 2022-03-31 LAB
BILIRUB UR QL STRIP: NEGATIVE
GLUCOSE UR-MCNC: NEGATIVE MG/DL
KETONES P FAST UR STRIP-MCNC: NORMAL MG/DL
PH UR STRIP: 5 [PH] (ref 4.6–8)
PROT UR QL STRIP: NEGATIVE
SP GR UR STRIP: 1.03 (ref 1–1.03)
UA UROBILINOGEN AMB POC: NORMAL (ref 0.2–1)
URINALYSIS CLARITY POC: NORMAL
URINALYSIS COLOR POC: YELLOW
URINE BLOOD POC: NEGATIVE
URINE LEUKOCYTES POC: NORMAL
URINE NITRITES POC: NEGATIVE

## 2022-03-31 PROCEDURE — 81003 URINALYSIS AUTO W/O SCOPE: CPT | Performed by: FAMILY MEDICINE

## 2022-03-31 PROCEDURE — 99214 OFFICE O/P EST MOD 30 MIN: CPT | Performed by: FAMILY MEDICINE

## 2022-03-31 RX ORDER — DULOXETIN HYDROCHLORIDE 30 MG/1
30 CAPSULE, DELAYED RELEASE ORAL DAILY
Qty: 90 CAPSULE | Refills: 0 | Status: SHIPPED | OUTPATIENT
Start: 2022-03-31 | End: 2022-06-09

## 2022-03-31 RX ORDER — SULFAMETHOXAZOLE AND TRIMETHOPRIM 800; 160 MG/1; MG/1
1 TABLET ORAL 2 TIMES DAILY
Qty: 6 TABLET | Refills: 0 | Status: SHIPPED | OUTPATIENT
Start: 2022-03-31 | End: 2022-04-03

## 2022-03-31 RX ORDER — SUMATRIPTAN 100 MG/1
TABLET, FILM COATED ORAL
Qty: 9 TABLET | Refills: 0 | Status: SHIPPED | OUTPATIENT
Start: 2022-03-31 | End: 2022-06-09

## 2022-03-31 RX ORDER — AMLODIPINE BESYLATE 5 MG/1
5 TABLET ORAL DAILY
Qty: 90 TABLET | Refills: 0 | Status: SHIPPED | OUTPATIENT
Start: 2022-03-31

## 2022-03-31 RX ORDER — BUTALBITAL, ACETAMINOPHEN AND CAFFEINE 300; 40; 50 MG/1; MG/1; MG/1
CAPSULE ORAL
Qty: 12 CAPSULE | Refills: 0 | Status: SHIPPED | OUTPATIENT
Start: 2022-03-31 | End: 2022-06-09

## 2022-03-31 RX ORDER — CETIRIZINE HCL 10 MG
10 TABLET ORAL DAILY
Qty: 90 TABLET | Refills: 0 | Status: SHIPPED | OUTPATIENT
Start: 2022-03-31

## 2022-03-31 RX ORDER — FLUTICASONE PROPIONATE 50 MCG
2 SPRAY, SUSPENSION (ML) NASAL DAILY
Qty: 1 EACH | Refills: 0 | Status: SHIPPED | OUTPATIENT
Start: 2022-03-31 | End: 2022-04-26

## 2022-03-31 NOTE — PATIENT INSTRUCTIONS
DASH Diet: Care Instructions  Your Care Instructions     The DASH diet is an eating plan that can help lower your blood pressure. DASH stands for Dietary Approaches to Stop Hypertension. Hypertension is high blood pressure. The DASH diet focuses on eating foods that are high in calcium, potassium, and magnesium. These nutrients can lower blood pressure. The foods that are highest in these nutrients are fruits, vegetables, low-fat dairy products, nuts, seeds, and legumes. But taking calcium, potassium, and magnesium supplements instead of eating foods that are high in those nutrients does not have the same effect. The DASH diet also includes whole grains, fish, and poultry. The DASH diet is one of several lifestyle changes your doctor may recommend to lower your high blood pressure. Your doctor may also want you to decrease the amount of sodium in your diet. Lowering sodium while following the DASH diet can lower blood pressure even further than just the DASH diet alone. Follow-up care is a key part of your treatment and safety. Be sure to make and go to all appointments, and call your doctor if you are having problems. It's also a good idea to know your test results and keep a list of the medicines you take. How can you care for yourself at home? Following the DASH diet  · Eat 4 to 5 servings of fruit each day. A serving is 1 medium-sized piece of fruit, ½ cup chopped or canned fruit, 1/4 cup dried fruit, or 4 ounces (½ cup) of fruit juice. Choose fruit more often than fruit juice. · Eat 4 to 5 servings of vegetables each day. A serving is 1 cup of lettuce or raw leafy vegetables, ½ cup of chopped or cooked vegetables, or 4 ounces (½ cup) of vegetable juice. Choose vegetables more often than vegetable juice. · Get 2 to 3 servings of low-fat and fat-free dairy each day. A serving is 8 ounces of milk, 1 cup of yogurt, or 1 ½ ounces of cheese. · Eat 6 to 8 servings of grains each day.  A serving is 1 slice of bread, 1 ounce of dry cereal, or ½ cup of cooked rice, pasta, or cooked cereal. Try to choose whole-grain products as much as possible. · Limit lean meat, poultry, and fish to 2 servings each day. A serving is 3 ounces, about the size of a deck of cards. · Eat 4 to 5 servings of nuts, seeds, and legumes (cooked dried beans, lentils, and split peas) each week. A serving is 1/3 cup of nuts, 2 tablespoons of seeds, or ½ cup of cooked beans or peas. · Limit fats and oils to 2 to 3 servings each day. A serving is 1 teaspoon of vegetable oil or 2 tablespoons of salad dressing. · Limit sweets and added sugars to 5 servings or less a week. A serving is 1 tablespoon jelly or jam, ½ cup sorbet, or 1 cup of lemonade. · Eat less than 2,300 milligrams (mg) of sodium a day. If you limit your sodium to 1,500 mg a day, you can lower your blood pressure even more. · Be aware that all of these are the suggested number of servings for people who eat 1,800 to 2,000 calories a day. Your recommended number of servings may be different if you need more or fewer calories. Tips for success  · Start small. Do not try to make dramatic changes to your diet all at once. You might feel that you are missing out on your favorite foods and then be more likely to not follow the plan. Make small changes, and stick with them. Once those changes become habit, add a few more changes. · Try some of the following:  ? Make it a goal to eat a fruit or vegetable at every meal and at snacks. This will make it easy to get the recommended amount of fruits and vegetables each day. ? Try yogurt topped with fruit and nuts for a snack or healthy dessert. ? Add lettuce, tomato, cucumber, and onion to sandwiches. ? Combine a ready-made pizza crust with low-fat mozzarella cheese and lots of vegetable toppings. Try using tomatoes, squash, spinach, broccoli, carrots, cauliflower, and onions. ?  Have a variety of cut-up vegetables with a low-fat dip as an appetizer instead of chips and dip. ? Sprinkle sunflower seeds or chopped almonds over salads. Or try adding chopped walnuts or almonds to cooked vegetables. ? Try some vegetarian meals using beans and peas. Add garbanzo or kidney beans to salads. Make burritos and tacos with mashed pitts beans or black beans. Where can you learn more? Go to http://www.hair.com/  Enter H967 in the search box to learn more about \"DASH Diet: Care Instructions. \"  Current as of: January 10, 2022               Content Version: 13.2  © 2983-5982 [employer]. Care instructions adapted under license by "[employer] (which disclaims liability or warranty for this information). If you have questions about a medical condition or this instruction, always ask your healthcare professional. [address] any warranty or liability for your use of this information.

## 2022-03-31 NOTE — PROGRESS NOTES
1. Have you been to the ER, urgent care clinic since your last visit? Hospitalized since your last visit? No    2. Have you seen or consulted any other health care providers outside of the [address] [place] [address] since your last visit? Include any pap smears or colon screening.  No        Chief Complaint   Patient presents with    Sinus Pain    Headache    Eye Problem     vision changes - needs referral to eye problem    Urinary Frequency     x 2 weeks    Hypertension    Constipation

## 2022-03-31 NOTE — PROGRESS NOTES
Gatito Maza, 42 y.o.,  female    SUBJECTIVE  Ff-up and concerns    Have not seen pt in over a year, requesting med refills    Migraine headache-doing well, infrequent episodes, rarely taking imitrex. Depression/anxiety- reports to be doing well on  cymbalta.      HTN-taking norvasc. She continues to smoke. Says has not been checking BP at home. FABIANA-now following dr. Gabbie Calloway, says has yet to hear about about CPAP settings, advised to call their office    IBS-C- responding to linzess, reviewed GI note plan for [facility] at age 38 per current guidelines. requesting refill    C/o blurring of vision past few months, no other eye complaints    Noticed urinary freq, urgency past 2 weeks. No flank pain, fever, or dysuria. ROS:  See HPI, all others negative        Patient Active Problem List   Diagnosis Code    Tobacco use Z72.0    Tension headache G44.209    Recurrent depression ([address] F33.9    Essential hypertension I10    Chronic constipation K59.09    Abnormal uterine bleeding (AUB) N93.9    Secondary dysmenorrhea N94.5    Severe obesity (BMI 35.0-39. 9) with comorbidity (HCC) E66.01    Mild episode of recurrent major depressive disorder ([address] F33.0    Severe obesity (BMI 35.0-39. 9) E66.01    Migraine with aura and without status migrainosus, not intractable G43. 109    Sleeping difficulty G47.9    Irritable bowel syndrome with constipation K58.1       Current Outpatient Medications   Medication Sig Dispense Refill    amLODIPine (NORVASC) 5 mg tablet Take 1 Tablet by mouth daily. 90 Tablet 0    butalbital-acetaminophen-caff (FIORICET) -40 mg per capsule Take 1 Cap by Mouth Every 4 Hours. 12 Capsule 0    DULoxetine (CYMBALTA) 30 mg capsule Take 1 Capsule by mouth daily. 90 Capsule 0    fluticasone propionate (FLONASE) 50 mcg/actuation nasal spray 2 Sprays by Both Nostrils route daily. 1 Each 0    linaCLOtide (Linzess) 145 mcg cap capsule Take 1 Capsule by mouth Daily (before breakfast).  80 Capsule 3    SUMAtriptan (IMITREX) 100 mg tablet Take one tab at HA onset, may repeat once after 2 hours. Max two tabs in 24 hours 9 Tablet 0    trimethoprim-sulfamethoxazole (Bactrim DS) 160-800 mg per tablet Take 1 Tablet by mouth two (2) times a day for 3 days. 6 Tablet 0    cetirizine (ZYRTEC) 10 mg tablet Take 1 Tablet by mouth daily. 90 Tablet 0    azithromycin (ZITHROMAX) 250 mg tablet Take two tablets today then one tablet daily (Patient not taking: Reported on 3/31/2022) 6 Tab 0       Allergies   Allergen Reactions    Carrot Swelling     Mouth and gum swelling      Pcn [Penicillins] Other (comments)     Patient states not allergic but gets candidiasis from it.      Strawberry Hives and Swelling       Past Medical History:   Diagnosis Date    Anxiety and depression     Bruises easily     Cervical high risk human papillomavirus (HPV) DNA test positive 03/2017    rpt pap smear 3/2018    Chronic constipation     corazon, dr. Gilmore Spearing    Contact dermatitis and other eczema, due to unspecified cause     Depression 12/2/2015    Essential hypertension 12/30/2015    GERD (gastroesophageal reflux disease) 9/13/2013    Gynecologic exam normal     Colwill    Headache(784.0)     Heavy menses     Hx of migraines     Irregular menses     Ovarian cyst     Pelvic pain        Social History     Socioeconomic History    Marital status: SINGLE     Spouse name: Not on file    Number of children: Not on file    Years of education: Not on file    Highest education level: Not on file   Occupational History    Not on file   Tobacco Use    Smoking status: Current Every Day Smoker     Packs/day: 0.08     Years: 10.00     Pack years: 0.80    Smokeless tobacco: Never Used    Tobacco comment: smokes cigars - 1.5 a day   Substance and Sexual Activity    Alcohol use: Yes     Comment: occasionally    Drug use: No    Sexual activity: Not on file   Other Topics Concern    Not on file   Social History Narrative  Not on file     Social Determinants of Health     Financial Resource Strain:     Difficulty of Paying Living Expenses: Not on file   Food Insecurity:     Worried About Running Out of Food in the Last Year: Not on file    Clair of Food in the Last Year: Not on file   Transportation Needs:     Lack of Transportation (Medical): Not on file    Lack of Transportation (Non-Medical):  Not on file   Physical Activity:     Days of Exercise per Week: Not on file    Minutes of Exercise per Session: Not on file   Stress:     Feeling of Stress : Not on file   Social Connections:     Frequency of Communication with Friends and Family: Not on file    Frequency of Social Gatherings with Friends and Family: Not on file    Attends Spiritism Services: Not on file    Active Member of Clubs or Organizations: Not on file    Attends Club or Organization Meetings: Not on file    Marital Status: Not on file   Intimate Partner Violence:     Fear of Current or Ex-Partner: Not on file    Emotionally Abused: Not on file    Physically Abused: Not on file    Sexually Abused: Not on file   Housing Stability:     Unable to Pay for Housing in the Last Year: Not on file    Number of [place] in the Last Year: Not on file    Unstable Housing in the Last Year: Not on file       Family History   Problem Relation Age of Onset    Diabetes Mother     Hypertension Mother     Kidney Disease Mother         stones    Hypertension Father     Diabetes Sister     Hypertension Sister     Stroke Sister          OBJECTIVE    Physical Exam:     Visit Vitals  /86 (BP 1 Location: Left upper arm, BP Patient Position: Sitting, BP Cuff Size: Adult)   Pulse 77   Temp 97.6 °F (36.4 °C) (Temporal)   Resp 16   Wt 219 lb 12.8 oz (99.7 kg)   LMP 03/06/2022   SpO2 99%   BMI 36.58 kg/m²       General: alert, well-appearing, AA, in no apparent distress or pain  Neck: supple, no adenopathy palpated  CVS: normal rate, regular rhythm, distinct S1 and S2  Lungs:clear to ausculation bilaterally, no crackles, wheezing or rhonchi noted  Abdomen: normoactive bowel sounds, soft, non-tender  Extremities: no edema, no cyanosis, MSK grossly normal  Skin: warm, no lesions, rashes noted  Psych:  mood and affect normal    Results for orders placed or performed in visit on 03/31/22   AMB POC URINALYSIS DIP STICK AUTO W/O MICRO   Result Value Ref Range    Color (UA POC) Yellow     Clarity (UA POC) Cloudy     Glucose (UA POC) Negative Negative    Bilirubin (UA POC) Negative Negative    Ketones (UA POC) Trace Negative    Specific gravity (UA POC) 1.030 1.001 - 1.035    Blood (UA POC) Negative Negative    pH (UA POC) 5.0 4.6 - 8.0    Protein (UA POC) Negative Negative    Urobilinogen (UA POC) 0.2 mg/dL 0.2 - 1    Nitrites (UA POC) Negative Negative    Leukocyte esterase (UA POC) 1+ Negative         ASSESSMENT/PLAN  Diagnoses and all orders for this visit:    1. Urine frequency  Start bactrim  -     AMB POC URINALYSIS DIP STICK AUTO W/O MICRO  -     CULTURE, URINE; Future    2. Essential hypertension  Controlled  Cont norvasc  -     amLODIPine (NORVASC) 5 mg tablet; Take 1 Tablet by mouth daily.  -     METABOLIC PANEL, COMPREHENSIVE; Future  -     LIPID PANEL; Future    3. Blurry vision  -     REFERRAL TO OPHTHALMOLOGY    4. Recurrent depression ([facility]  Fair control  Cont cymbalta    5. Chronic constipation  Cont linzess  High fiber diet  -     CBC WITH AUTOMATED DIFF; Future  -     TSH 3RD GENERATION; Future    6. Irritable bowel syndrome with constipation  See above    7. Migraine with aura and without status migrainosus, not intractable  Cont imitrex   8. Tobacco use  Advised cessation  9. Severe obesity (BMI 35.0-39. 9) with comorbidity ([address]    10. Screening for diabetes mellitus (DM)  -     METABOLIC PANEL, COMPREHENSIVE; Future  -     HEMOGLOBIN A1C WITH EAG; Future    11.  Seasonal allergic rhinitis due to pollen  Start flonase, zyrtec    Other orders  - butalbital-acetaminophen-caff (FIORICET) -40 mg per capsule; Take 1 Cap by Mouth Every 4 Hours. -     DULoxetine (CYMBALTA) 30 mg capsule; Take 1 Capsule by mouth daily. -     fluticasone propionate (FLONASE) 50 mcg/actuation nasal spray; 2 Sprays by Both Nostrils route daily. -     linaCLOtide (Linzess) 145 mcg cap capsule; Take 1 Capsule by mouth Daily (before breakfast). -     SUMAtriptan (IMITREX) 100 mg tablet; Take one tab at HA onset, may repeat once after 2 hours. Max two tabs in 24 hours  -     trimethoprim-sulfamethoxazole (Bactrim DS) 160-800 mg per tablet; Take 1 Tablet by mouth two (2) times a day for 3 days. -     cetirizine (ZYRTEC) 10 mg tablet; Take 1 Tablet by mouth daily. Follow-up and Dispositions    · Return in about 4 weeks (around 4/28/2022), or if symptoms worsen or fail to improve, for labs soon, routine chronic illness care. Patient understands plan of care. Patient has provided input and agrees with goals.

## 2022-04-01 ENCOUNTER — HOSPITAL ENCOUNTER (OUTPATIENT)
Dept: LAB | Age: 42
Discharge: HOME | End: 2022-04-01

## 2022-04-01 LAB — XX-LABCORP SPECIMEN COL,LCBCF: NORMAL

## 2022-04-01 PROCEDURE — 99001 SPECIMEN HANDLING PT-LAB: CPT

## 2022-04-03 LAB — BACTERIA UR CULT: NORMAL

## 2022-04-26 RX ORDER — FLUTICASONE PROPIONATE 50 MCG
SPRAY, SUSPENSION (ML) NASAL
Qty: 1 EACH | Refills: 0 | Status: SHIPPED | OUTPATIENT
Start: 2022-04-26 | End: 2022-05-20

## 2022-05-02 ENCOUNTER — HOSPITAL ENCOUNTER (OUTPATIENT)
Dept: LAB | Age: 42
Discharge: HOME | End: 2022-05-02

## 2022-05-02 LAB — SENTARA SPECIMEN COL,SENBCF: NORMAL

## 2022-05-02 PROCEDURE — 99001 SPECIMEN HANDLING PT-LAB: CPT

## 2022-05-04 LAB
ALBUMIN SERPL-MCNC: 4.1 G/DL (ref 3.8–4.8)
ALBUMIN/GLOB SERPL: 1.2 {RATIO} (ref 1.2–2.2)
ALP SERPL-CCNC: 113 IU/L (ref 44–121)
ALT SERPL-CCNC: 43 IU/L (ref 0–32)
AST SERPL-CCNC: 36 IU/L (ref 0–40)
BASOPHILS # BLD AUTO: 0 X10E3/UL (ref 0–0.2)
BASOPHILS NFR BLD AUTO: 0 %
BILIRUB SERPL-MCNC: 0.4 MG/DL (ref 0–1.2)
BUN SERPL-MCNC: 7 MG/DL (ref 6–24)
BUN/CREAT SERPL: 11 (ref 9–23)
CALCIUM SERPL-MCNC: 9.4 MG/DL (ref 8.7–10.2)
CHLORIDE SERPL-SCNC: 99 MMOL/L (ref 96–106)
CHOLEST SERPL-MCNC: 101 MG/DL (ref 100–199)
CO2 SERPL-SCNC: 23 MMOL/L (ref 20–29)
CREAT SERPL-MCNC: 0.64 MG/DL (ref 0.57–1)
EGFR: 112 ML/MIN/1.73
EOSINOPHIL # BLD AUTO: 0.1 X10E3/UL (ref 0–0.4)
EOSINOPHIL NFR BLD AUTO: 1 %
ERYTHROCYTE [DISTWIDTH] IN BLOOD BY AUTOMATED COUNT: 13.1 % (ref 11.7–15.4)
EST. AVERAGE GLUCOSE BLD GHB EST-MCNC: 114 MG/DL
GLOBULIN SER CALC-MCNC: 3.4 G/DL (ref 1.5–4.5)
GLUCOSE SERPL-MCNC: 82 MG/DL (ref 65–99)
HBA1C MFR BLD: 5.6 % (ref 4.8–5.6)
HCT VFR BLD AUTO: 46.8 % (ref 34–46.6)
HDLC SERPL-MCNC: 37 MG/DL
HGB BLD-MCNC: 14.5 G/DL (ref 11.1–15.9)
IMM GRANULOCYTES # BLD AUTO: 0 X10E3/UL (ref 0–0.1)
IMM GRANULOCYTES NFR BLD AUTO: 0 %
IMP & REVIEW OF LAB RESULTS: NORMAL
LDLC SERPL CALC-MCNC: 51 MG/DL (ref 0–99)
LYMPHOCYTES # BLD AUTO: 1.9 X10E3/UL (ref 0.7–3.1)
LYMPHOCYTES NFR BLD AUTO: 26 %
MCH RBC QN AUTO: 26.5 PG (ref 26.6–33)
MCHC RBC AUTO-ENTMCNC: 31 G/DL (ref 31.5–35.7)
MCV RBC AUTO: 86 FL (ref 79–97)
MONOCYTES # BLD AUTO: 0.5 X10E3/UL (ref 0.1–0.9)
MONOCYTES NFR BLD AUTO: 6 %
NEUTROPHILS # BLD AUTO: 4.8 X10E3/UL (ref 1.4–7)
NEUTROPHILS NFR BLD AUTO: 67 %
PLATELET # BLD AUTO: 247 X10E3/UL (ref 150–450)
POTASSIUM SERPL-SCNC: 4.7 MMOL/L (ref 3.5–5.2)
PROT SERPL-MCNC: 7.5 G/DL (ref 6–8.5)
RBC # BLD AUTO: 5.47 X10E6/UL (ref 3.77–5.28)
SODIUM SERPL-SCNC: 138 MMOL/L (ref 134–144)
TRIGL SERPL-MCNC: 58 MG/DL (ref 0–149)
TSH SERPL DL<=0.005 MIU/L-ACNC: 1.27 UIU/ML (ref 0.45–4.5)
VLDLC SERPL CALC-MCNC: 13 MG/DL (ref 5–40)
WBC # BLD AUTO: 7.3 X10E3/UL (ref 3.4–10.8)

## 2022-05-09 ENCOUNTER — VIRTUAL VISIT (OUTPATIENT)
Dept: FAMILY MEDICINE CLINIC | Age: 42
End: 2022-05-09
Payer: COMMERCIAL

## 2022-05-09 DIAGNOSIS — G43.109 MIGRAINE WITH AURA AND WITHOUT STATUS MIGRAINOSUS, NOT INTRACTABLE: ICD-10-CM

## 2022-05-09 DIAGNOSIS — R11.0 NAUSEA: ICD-10-CM

## 2022-05-09 DIAGNOSIS — I10 ESSENTIAL HYPERTENSION: ICD-10-CM

## 2022-05-09 DIAGNOSIS — Z72.0 TOBACCO USE: ICD-10-CM

## 2022-05-09 DIAGNOSIS — K58.1 IRRITABLE BOWEL SYNDROME WITH CONSTIPATION: ICD-10-CM

## 2022-05-09 DIAGNOSIS — M79.89 SWELLING OF RIGHT HAND: Primary | ICD-10-CM

## 2022-05-09 DIAGNOSIS — K59.09 CHRONIC CONSTIPATION: ICD-10-CM

## 2022-05-09 PROCEDURE — 99214 OFFICE O/P EST MOD 30 MIN: CPT | Performed by: FAMILY MEDICINE

## 2022-05-09 RX ORDER — PANTOPRAZOLE SODIUM 40 MG/1
40 TABLET, DELAYED RELEASE ORAL DAILY
Qty: 90 TABLET | Refills: 0 | Status: SHIPPED | OUTPATIENT
Start: 2022-05-09 | End: 2022-06-09

## 2022-05-09 NOTE — PROGRESS NOTES
Lise Davis is a 36 y.o. female who was seen by synchronous (real-time) audio-video technology on 5/9/2022. Consent: Lise Davis, who was seen by synchronous (real-time) audio-video technology, and/or her healthcare decision maker, is aware that this patient-initiated, Telehealth encounter on 5/9/2022 is a billable service, with coverage as determined by her insurance carrier. She is aware that she may receive a bill and has provided verbal consent to proceed: Yes. 712  Subjective:   Lise Davis is a 36 y.o. female who was seen for No chief complaint on file. Ff-up    HTN- reviewed labs, continues to take norvasc, she reports nausea she thinks related to this med though has been on norvasc for several years already. She reports nausea on and off without pattern with meals. She does have chronic constipation on linzess. She is requesting to see GI again with some difficulty with controlling BM w/ intermittent abdominal pain,  says diarrhea on linzess, constipation off med. Denies heartburn, dyphagia,wt loss, nsaid use. R hand pain and swelling past month, no trauma, no redness, no fever. She continues to smoke  Migraine headache/depression- doing fairly well, continues to take cymbalta w/ prn fioricet      Prior to Admission medications    Medication Sig Start Date End Date Taking? Authorizing Provider   pantoprazole (PROTONIX) 40 mg tablet Take 1 Tablet by mouth daily. 5/9/22  Yes Carlos Gomes MD   fluticasone propionate (FLONASE) 50 mcg/actuation nasal spray SPRAY 2 SPRAYS INTO EACH NOSTRIL EVERY DAY 4/26/22  Yes Jules Watts MD   amLODIPine (NORVASC) 5 mg tablet Take 1 Tablet by mouth daily. 3/31/22  Yes Carlos Gomes MD   butalbital-acetaminophen-caff (FIORICET) -40 mg per capsule Take 1 Cap by Mouth Every 4 Hours. 3/31/22  Yes Jules Watts MD   DULoxetine (CYMBALTA) 30 mg capsule Take 1 Capsule by mouth daily.  3/31/22  Yes Keren Watts, MD   linaCLOtide (Linzess) 145 mcg cap capsule Take 1 Capsule by mouth Daily (before breakfast). 3/31/22  Yes Dru Ellison MD   SUMAtriptan (IMITREX) 100 mg tablet Take one tab at HA onset, may repeat once after 2 hours. Max two tabs in 24 hours 3/31/22  Yes Dru Ellison MD   cetirizine (ZYRTEC) 10 mg tablet Take 1 Tablet by mouth daily. 3/31/22  Yes Dru Ellison MD     Allergies   Allergen Reactions    Carrot Swelling     Mouth and gum swelling      Pcn [Penicillins] Other (comments)     Patient states not allergic but gets candidiasis from it.  Strawberry Hives and Swelling       Patient Active Problem List    Diagnosis Date Noted    Irritable bowel syndrome with constipation 05/16/2019    Migraine with aura and without status migrainosus, not intractable 03/15/2019    Sleeping difficulty 03/15/2019    Severe obesity (BMI 35.0-39. 9) with comorbidity ([facility] 05/08/2018    Mild episode of recurrent major depressive disorder ([facility] 05/08/2018    Severe obesity (BMI 35.0-39.9) 05/08/2018    Abnormal uterine bleeding (AUB) 04/17/2018    Secondary dysmenorrhea 04/17/2018    Chronic constipation 04/11/2016    Essential hypertension 12/30/2015    Recurrent depression ([facility] 12/02/2015    Tobacco use 09/13/2013    Tension headache 09/13/2013     Current Outpatient Medications   Medication Sig Dispense Refill    pantoprazole (PROTONIX) 40 mg tablet Take 1 Tablet by mouth daily. 90 Tablet 0    fluticasone propionate (FLONASE) 50 mcg/actuation nasal spray SPRAY 2 SPRAYS INTO EACH NOSTRIL EVERY DAY 1 Each 0    amLODIPine (NORVASC) 5 mg tablet Take 1 Tablet by mouth daily. 90 Tablet 0    butalbital-acetaminophen-caff (FIORICET) -40 mg per capsule Take 1 Cap by Mouth Every 4 Hours. 12 Capsule 0    DULoxetine (CYMBALTA) 30 mg capsule Take 1 Capsule by mouth daily. 90 Capsule 0    linaCLOtide (Linzess) 145 mcg cap capsule Take 1 Capsule by mouth Daily (before breakfast).  90 Capsule 3  SUMAtriptan (IMITREX) 100 mg tablet Take one tab at HA onset, may repeat once after 2 hours. Max two tabs in 24 hours 9 Tablet 0    cetirizine (ZYRTEC) 10 mg tablet Take 1 Tablet by mouth daily. 90 Tablet 0     Allergies   Allergen Reactions    Carrot Swelling     Mouth and gum swelling      Pcn [Penicillins] Other (comments)     Patient states not allergic but gets candidiasis from it.  Strawberry Hives and Swelling     Past Medical History:   Diagnosis Date    Anxiety and depression     Bruises easily     Cervical high risk human papillomavirus (HPV) DNA test positive 03/2017    rpt pap smear 3/2018    Chronic constipation     dr. Lesly chandra    Contact dermatitis and other eczema, due to unspecified cause     Depression 12/2/2015    Essential hypertension 12/30/2015    GERD (gastroesophageal reflux disease) 9/13/2013    Gynecologic exam normal     Colwill    Headache(784.0)     Heavy menses     Hx of migraines     Irregular menses     Ovarian cyst     Pelvic pain      Past Surgical History:   Procedure Laterality Date    HX APPENDECTOMY      HX TUBAL LIGATION       Family History   Problem Relation Age of Onset    Diabetes Mother     Hypertension Mother     Kidney Disease Mother         stones    Hypertension Father     Diabetes Sister     Hypertension Sister     Stroke Sister      Social History     Tobacco Use    Smoking status: Current Every Day Smoker     Packs/day: 0.08     Years: 10.00     Pack years: 0.80    Smokeless tobacco: Never Used    Tobacco comment: smokes cigars - 1.5 a day   Substance Use Topics    Alcohol use: Yes     Comment: occasionally       ROS      Objective: There were no vitals taken for this visit.    General: alert, cooperative, no distress   Mental  status: normal mood, behavior, speech, dress, motor activity, and thought processes, able to follow commands   HENT: NCAT   Neck: no visualized mass   Resp: no respiratory distress   Neuro: no gross deficits   Skin: no discoloration or lesions of concern on visible areas   Psychiatric: normal affect, consistent with stated mood, no evidence of hallucinations     Additional exam findings: We discussed the expected course, resolution and complications of the diagnosis(es) in detail. Medication risks, benefits, costs, interactions, and alternatives were discussed as indicated. I advised her to contact the office if her condition worsens, changes or fails to improve as anticipated. She expressed understanding with the diagnosis(es) and plan. Emilie Robles is a 36 y.o. female who was evaluated by a video visit encounter for concerns as above. Patient identification was verified prior to start of the visit. A caregiver was present when appropriate. Due to this being a TeleHealth encounter (During [facility]-30 public health emergency), evaluation of the following organ systems was limited: Vitals/Constitutional/EENT/Resp/CV/GI//MS/Neuro/Skin/Heme-Lymph-Imm. Pursuant to the emergency declaration under the [facility], [facility]5 waiver authority and the [employer] Act, this Virtual  Visit was conducted, with patient's (and/or legal guardian's) consent, to reduce the patient's risk of exposure to COVID-19 and provide necessary medical care. Services were provided through a video synchronous discussion virtually to substitute for in-person clinic visit. Patient and provider were located at their individual homes. Results for orders placed or performed during the hospital encounter of 05/02/22   [address] [place] [address]. Result Value Ref Range    [facility] Specimens collected/sent to [facility]         Assessment & Plan:   Diagnoses and all orders for this visit:    1. Swelling of right hand  Start work up  -     XR HAND RT MIN 3 V; Future  -     SED RATE (ESR);  Future  -     RHEUMATOID FACTOR, QT; Future  -     WALT COMPREHENSIVE PANEL; Future    2. Chronic constipation  Refer back to GI-     REFERRAL TO GASTROENTEROLOGY  High fiber diet, increase fluid    3. Nausea  Trial protonix  -     REFERRAL TO GASTROENTEROLOGY  -     LIPASE; Future    4. Irritable bowel syndrome with constipation    5. Essential hypertension  Controlled  Cont  norvasc    6. Tobacco use  Advised complete cessatin    7. Migraine with aura and without status migrainosus, not intractable  Stable  Cont cymbalta, prn fioricet    Other orders  -     pantoprazole (PROTONIX) 40 mg tablet; Take 1 Tablet by mouth daily.       Ff-up in 4 weeks or sooner prn    Camilo Peterson MD

## 2022-05-11 ENCOUNTER — HOSPITAL ENCOUNTER (OUTPATIENT)
Dept: LAB | Age: 42
Discharge: HOME | End: 2022-05-11
Payer: COMMERCIAL

## 2022-05-11 ENCOUNTER — HOSPITAL ENCOUNTER (OUTPATIENT)
Dept: GENERAL RADIOLOGY | Age: 42
Discharge: HOME | End: 2022-05-11
Payer: COMMERCIAL

## 2022-05-11 DIAGNOSIS — M79.89 SWELLING OF RIGHT HAND: ICD-10-CM

## 2022-05-11 LAB — XX-LABCORP SPECIMEN COL,LCBCF: NORMAL

## 2022-05-11 PROCEDURE — 99001 SPECIMEN HANDLING PT-LAB: CPT

## 2022-05-11 PROCEDURE — 73130 X-RAY EXAM OF HAND: CPT

## 2022-05-12 LAB
CENTROMERE B AB SER-ACNC: <0.2 AI (ref 0–0.9)
CHROMATIN AB SERPL-ACNC: <0.2 AI (ref 0–0.9)
DSDNA AB SER-ACNC: 1 IU/ML (ref 0–9)
ENA JO1 AB SER-ACNC: <0.2 AI (ref 0–0.9)
ENA RNP AB SER-ACNC: <0.2 AI (ref 0–0.9)
ENA SCL70 AB SER-ACNC: <0.2 AI (ref 0–0.9)
ENA SM AB SER-ACNC: <0.2 AI (ref 0–0.9)
ENA SS-A AB SER-ACNC: <0.2 AI (ref 0–0.9)
ENA SS-B AB SER-ACNC: <0.2 AI (ref 0–0.9)
ERYTHROCYTE [SEDIMENTATION RATE] IN BLOOD BY WESTERGREN METHOD: 31 MM/HR (ref 0–32)
LIPASE SERPL-CCNC: 52 U/L (ref 14–72)
RHEUMATOID FACT SERPL-ACNC: <10 IU/ML (ref 0–15)
SEE BELOW, 164869: NORMAL
SPECIMEN STATUS REPORT, ROLRST: NORMAL

## 2022-06-09 ENCOUNTER — OFFICE VISIT (OUTPATIENT)
Dept: FAMILY MEDICINE CLINIC | Age: 42
End: 2022-06-09
Payer: COMMERCIAL

## 2022-06-09 VITALS
DIASTOLIC BLOOD PRESSURE: 86 MMHG | OXYGEN SATURATION: 100 % | RESPIRATION RATE: 16 BRPM | BODY MASS INDEX: 36.82 KG/M2 | SYSTOLIC BLOOD PRESSURE: 136 MMHG | HEART RATE: 64 BPM | HEIGHT: 65 IN | WEIGHT: 221 LBS | TEMPERATURE: 98 F

## 2022-06-09 DIAGNOSIS — I10 ESSENTIAL HYPERTENSION: ICD-10-CM

## 2022-06-09 DIAGNOSIS — M79.602 LEFT ARM PAIN: Primary | ICD-10-CM

## 2022-06-09 DIAGNOSIS — K58.1 IRRITABLE BOWEL SYNDROME WITH CONSTIPATION: ICD-10-CM

## 2022-06-09 DIAGNOSIS — E66.01 SEVERE OBESITY (BMI 35.0-39.9) WITH COMORBIDITY (HCC): ICD-10-CM

## 2022-06-09 DIAGNOSIS — Z72.0 TOBACCO USE: ICD-10-CM

## 2022-06-09 PROCEDURE — 99214 OFFICE O/P EST MOD 30 MIN: CPT | Performed by: FAMILY MEDICINE

## 2022-06-09 NOTE — PROGRESS NOTES
.  Deonna Ill, 36 y.o.,  female    SUBJECTIVE  Ff-up    HTN- continues to take norvasc.      Alternating left and Right arm/forearm pain ongoing for years, in the past she was told she has mild lupus and thought this was related to it. Xray of hand neg, inflammatory arthritis check is neg. She denies any neck pain or weakness. She continues to smoke    Migraine headache/depression- doing well, has come off of cymbalta and prn tylenol is effective. ROS:  See HPI, all others negative        Patient Active Problem List   Diagnosis Code    Tobacco use Z72.0    Tension headache G44.209    Recurrent depression ([address] F33.9    Essential hypertension I10    Chronic constipation K59.09    Abnormal uterine bleeding (AUB) N93.9    Secondary dysmenorrhea N94.5    Severe obesity (BMI 35.0-39. 9) with comorbidity (HCC) E66.01    Mild episode of recurrent major depressive disorder ([facility] F33.0    Severe obesity (BMI 35.0-39. 9) E66.01    Migraine with aura and without status migrainosus, not intractable G43. 109    Sleeping difficulty G47.9    Irritable bowel syndrome with constipation K58.1       Current Outpatient Medications   Medication Sig Dispense Refill    fluticasone propionate (FLONASE) 50 mcg/actuation nasal spray SPRAY 2 SPRAYS INTO EACH NOSTRIL EVERY DAY 1 Each 1    amLODIPine (NORVASC) 5 mg tablet Take 1 Tablet by mouth daily. 90 Tablet 0    linaCLOtide (Linzess) 145 mcg cap capsule Take 1 Capsule by mouth Daily (before breakfast). 90 Capsule 3    cetirizine (ZYRTEC) 10 mg tablet Take 1 Tablet by mouth daily. 90 Tablet 0       Allergies   Allergen Reactions    Carrot Swelling     Mouth and gum swelling      Pcn [Penicillins] Other (comments)     Patient states not allergic but gets candidiasis from it.      Strawberry Hives and Swelling       Past Medical History:   Diagnosis Date    Anxiety and depression     Bruises easily     Cervical high risk human papillomavirus (HPV) DNA test positive 03/2017    rpt pap smear 3/2018    Chronic constipation     dr. Ondina chandra    Contact dermatitis and other eczema, due to unspecified cause     Depression 12/2/2015    Essential hypertension 12/30/2015    GERD (gastroesophageal reflux disease) 9/13/2013    Gynecologic exam normal     Colwill    Headache(784.0)     Heavy menses     Hx of migraines     Irregular menses     Ovarian cyst     Pelvic pain        Social History     Socioeconomic History    Marital status: SINGLE     Spouse name: Not on file    Number of children: Not on file    Years of education: Not on file    Highest education level: Not on file   Occupational History    Not on file   Tobacco Use    Smoking status: Current Every Day Smoker     Packs/day: 0.08     Years: 10.00     Pack years: 0.80    Smokeless tobacco: Never Used    Tobacco comment: smokes cigars - 1.5 a day   Substance and Sexual Activity    Alcohol use: Yes     Comment: occasionally    Drug use: No    Sexual activity: Not on file   Other Topics Concern    Not on file   Social History Narrative    Not on file     Social Determinants of Health     Financial Resource Strain:     Difficulty of Paying Living Expenses: Not on file   Food Insecurity:     Worried About Running Out of Food in the Last Year: Not on file    Clair of Food in the Last Year: Not on file   Transportation Needs:     Lack of Transportation (Medical): Not on file    Lack of Transportation (Non-Medical):  Not on file   Physical Activity:     Days of Exercise per Week: Not on file    Minutes of Exercise per Session: Not on file   Stress:     Feeling of Stress : Not on file   Social Connections:     Frequency of Communication with Friends and Family: Not on file    Frequency of Social Gatherings with Friends and Family: Not on file    Attends [employer] Services: Not on file    Active Member of Clubs or Organizations: Not on file    Attends Club or Organization Meetings: Not on file    Marital Status: Not on file   Intimate Partner Violence:     Fear of Current or Ex-Partner: Not on file    Emotionally Abused: Not on file    Physically Abused: Not on file    Sexually Abused: Not on file   Housing Stability:     Unable to Pay for Housing in the Last Year: Not on file    Number of [place] in the Last Year: Not on file    Unstable Housing in the Last Year: Not on file       Family History   Problem Relation Age of Onset    Diabetes Mother     Hypertension Mother     Kidney Disease Mother         stones    Hypertension Father     Diabetes Sister     Hypertension Sister     Stroke Sister          OBJECTIVE    Physical Exam:     Visit Vitals  /86 (BP 1 Location: Left upper arm, BP Patient Position: Sitting, BP Cuff Size: Adult)   Pulse 64   Temp 98 °F (36.7 °C) (Temporal)   Resp 16   Ht 5' 5\" (1.651 m)   Wt 221 lb (100.2 kg)   SpO2 100%   BMI 36.78 kg/m²       General: alert, well-appearing, obese, AA, in no apparent distress or nicholas  Neck: supple, no adenopathy palpated, FROM, UE motor/dtrs intact  CVS: normal rate, regular rhythm, distinct S1 and S2  Lungs:clear to ausculation bilaterally, no crackles, wheezing or rhonchi noted  Abdomen: normoactive bowel sounds, soft, non-tender  Extremities: no edema, no cyanosis, MSK grossly normal  Skin: warm, no lesions, rashes noted  Psych:  mood and affect normal    Results for orders placed or performed during the hospital encounter of 05/11/22   [facility] SPECIMEN COL   Result Value Ref Range    [facility]. Specimens collected/sent to [facility]. Please direct inquiries to ([phone]). ASSESSMENT/PLAN  Diagnoses and all orders for this visit:    1. Left arm pain  -     REFERRAL TO SPINE SURGERY  -     XR SPINE CERV W OBL/FLEX/EXT MIN 6 V COMP; Future  Alternating Upper extremity for years, currently L arm, consider radicular source, will check xray and plan for EMG/PMNR eval    2.  Severe obesity (BMI 35.0-39. 9) with comorbidity ([address]    3. Irritable bowel syndrome with constipation  Ongoing care with GI    4. Essential hypertension  Controlled  Cont norvasc    5. Tobacco use  Advised complete cessation      Follow-up and Dispositions    · Return in about 8 months (around 2/9/2023), or if symptoms worsen or fail to improve, for routine chronic illness care. Patient understands plan of care.  Patient has provided input and agrees with goals.

## 2022-06-09 NOTE — PROGRESS NOTES
1. Have you been to the ER, urgent care clinic since your last visit? Hospitalized since your last visit? No    2. Have you seen or consulted any other health care providers outside of the [address] [place] [address] since your last visit? Include any pap smears or colon screening.  No

## 2022-06-09 NOTE — PATIENT INSTRUCTIONS
DASH Diet: Care Instructions  Your Care Instructions     The DASH diet is an eating plan that can help lower your blood pressure. DASH stands for Dietary Approaches to Stop Hypertension. Hypertension is high blood pressure. The DASH diet focuses on eating foods that are high in calcium, potassium, and magnesium. These nutrients can lower blood pressure. The foods that are highest in these nutrients are fruits, vegetables, low-fat dairy products, nuts, seeds, and legumes. But taking calcium, potassium, and magnesium supplements instead of eating foods that are high in those nutrients does not have the same effect. The DASH diet also includes whole grains, fish, and poultry. The DASH diet is one of several lifestyle changes your doctor may recommend to lower your high blood pressure. Your doctor may also want you to decrease the amount of sodium in your diet. Lowering sodium while following the DASH diet can lower blood pressure even further than just the DASH diet alone. Follow-up care is a key part of your treatment and safety. Be sure to make and go to all appointments, and call your doctor if you are having problems. It's also a good idea to know your test results and keep a list of the medicines you take. How can you care for yourself at home? Following the DASH diet  · Eat 4 to 5 servings of fruit each day. A serving is 1 medium-sized piece of fruit, ½ cup chopped or canned fruit, 1/4 cup dried fruit, or 4 ounces (½ cup) of fruit juice. Choose fruit more often than fruit juice. · Eat 4 to 5 servings of vegetables each day. A serving is 1 cup of lettuce or raw leafy vegetables, ½ cup of chopped or cooked vegetables, or 4 ounces (½ cup) of vegetable juice. Choose vegetables more often than vegetable juice. · Get 2 to 3 servings of low-fat and fat-free dairy each day. A serving is 8 ounces of milk, 1 cup of yogurt, or 1 ½ ounces of cheese. · Eat 6 to 8 servings of grains each day.  A serving is 1 slice of bread, 1 ounce of dry cereal, or ½ cup of cooked rice, pasta, or cooked cereal. Try to choose whole-grain products as much as possible. · Limit lean meat, poultry, and fish to 2 servings each day. A serving is 3 ounces, about the size of a deck of cards. · Eat 4 to 5 servings of nuts, seeds, and legumes (cooked dried beans, lentils, and split peas) each week. A serving is 1/3 cup of nuts, 2 tablespoons of seeds, or ½ cup of cooked beans or peas. · Limit fats and oils to 2 to 3 servings each day. A serving is 1 teaspoon of vegetable oil or 2 tablespoons of salad dressing. · Limit sweets and added sugars to 5 servings or less a week. A serving is 1 tablespoon jelly or jam, ½ cup sorbet, or 1 cup of lemonade. · Eat less than 2,300 milligrams (mg) of sodium a day. If you limit your sodium to 1,500 mg a day, you can lower your blood pressure even more. · Be aware that all of these are the suggested number of servings for people who eat 1,800 to 2,000 calories a day. Your recommended number of servings may be different if you need more or fewer calories. Tips for success  · Start small. Do not try to make dramatic changes to your diet all at once. You might feel that you are missing out on your favorite foods and then be more likely to not follow the plan. Make small changes, and stick with them. Once those changes become habit, add a few more changes. · Try some of the following:  ? Make it a goal to eat a fruit or vegetable at every meal and at snacks. This will make it easy to get the recommended amount of fruits and vegetables each day. ? Try yogurt topped with fruit and nuts for a snack or healthy dessert. ? Add lettuce, tomato, cucumber, and onion to sandwiches. ? Combine a ready-made pizza crust with low-fat mozzarella cheese and lots of vegetable toppings. Try using tomatoes, squash, spinach, broccoli, carrots, cauliflower, and onions. ?  Have a variety of cut-up vegetables with a low-fat dip as an appetizer instead of chips and dip. ? Sprinkle sunflower seeds or chopped almonds over salads. Or try adding chopped walnuts or almonds to cooked vegetables. ? Try some vegetarian meals using beans and peas. Add garbanzo or kidney beans to salads. Make burritos and tacos with mashed pitts beans or black beans. Where can you learn more? Go to http://www.hair.com/  Enter H967 in the search box to learn more about \"DASH Diet: Care Instructions. \"  Current as of: January 10, 2022               Content Version: 13.2  © 6108-4371 [employer]. Care instructions adapted under license by [employer] (which disclaims liability or warranty for this information). If you have questions about a medical condition or this instruction, always ask your healthcare professional. [address] any warranty or liability for your use of this information.

## 2022-07-05 ENCOUNTER — OFFICE VISIT (OUTPATIENT)
Dept: ORTHOPEDIC SURGERY | Age: 42
End: 2022-07-05
Payer: COMMERCIAL

## 2022-07-05 VITALS
SYSTOLIC BLOOD PRESSURE: 130 MMHG | RESPIRATION RATE: 16 BRPM | OXYGEN SATURATION: 98 % | DIASTOLIC BLOOD PRESSURE: 78 MMHG | HEART RATE: 84 BPM | TEMPERATURE: 98.2 F

## 2022-07-05 DIAGNOSIS — M79.602 LEFT ARM PAIN: ICD-10-CM

## 2022-07-05 DIAGNOSIS — M79.602 LEFT ARM PAIN: Primary | ICD-10-CM

## 2022-07-05 PROCEDURE — 95909 NRV CNDJ TST 5-6 STUDIES: CPT | Performed by: PHYSICAL MEDICINE & REHABILITATION

## 2022-07-05 NOTE — PROGRESS NOTES
[address]  [address], [address]  [place]  Phone: [phone]  Fax: [phone]        Senait Zeng  : 1980  PCP: Batsheva Hooker MD  2022    ELECTROMYOGRAPHY AND NERVE CONDUCTION STUDIES    Jose Eduardo Chavez was referred by Dr. Sharon Noe for electrodiagnostic evaluation of her left upper limb    NCV & EMG Findings:  All nerve conduction studies (as indicated in the following tables) were within normal limits. All examined muscles (as indicated in the following table) showed no evidence of electrical instability. INTERPRETATION  This was a normal nerve conduction and EMG study showing there to be no signs of neuropathy, myopathy, or radiculopathy in the nerves and muscles tested. CLINICAL INTERPRETATION  Her electrodiagnostic findings do not appear to explain her left arm symptoms. HISTORY OF PRESENT ILLNESS  Jose Eduardo Chavez is a 36 y.o. female. She has had this pain for over 20 years. This can affect any part of her arm. She feels a pain and throbbing. After an episode passes, she feels exhausted. These last for a few seconds usually. PAST MEDICAL HISTORY   Past Medical History:   Diagnosis Date    Anxiety and depression     Bruises easily     Cervical high risk human papillomavirus (HPV) DNA test positive 2017    rpt pap smear 3/2018    Chronic constipation     dr. Jaime chandra    Contact dermatitis and other eczema, due to unspecified cause     Depression 2015    Essential hypertension 2015    GERD (gastroesophageal reflux disease) 2013    Gynecologic exam normal     [place]    Headache(784.0)     Heavy menses     Hx of migraines     Irregular menses     Ovarian cyst     Pelvic pain        Past Surgical History:   Procedure Laterality Date    HX APPENDECTOMY      HX TUBAL LIGATION     .       MEDICATIONS    Current Outpatient Medications   Medication Sig Dispense Refill  fluticasone propionate (FLONASE) 50 mcg/actuation nasal spray SPRAY 2 SPRAYS INTO EACH NOSTRIL EVERY DAY 1 Each 1    amLODIPine (NORVASC) 5 mg tablet Take 1 Tablet by mouth daily. 90 Tablet 0    linaCLOtide (Linzess) 145 mcg cap capsule Take 1 Capsule by mouth Daily (before breakfast). 90 Capsule 3    cetirizine (ZYRTEC) 10 mg tablet Take 1 Tablet by mouth daily. 90 Tablet 0        ALLERGIES  Allergies   Allergen Reactions    Carrot Swelling     Mouth and gum swelling      Pcn [Penicillins] Other (comments)     Patient states not allergic but gets candidiasis from it.  Strawberry Hives and Swelling          SOCIAL HISTORY    Social History     Socioeconomic History    Marital status: SINGLE   Tobacco Use    Smoking status: Current Every Day Smoker     Packs/day: 0.08     Years: 10.00     Pack years: 0.80    Smokeless tobacco: Never Used    Tobacco comment: smokes cigars - 1.5 a day   Substance and Sexual Activity    Alcohol use: Yes     Comment: occasionally    Drug use: No       FAMILY HISTORY  Family History   Problem Relation Age of Onset    Diabetes Mother     Hypertension Mother     Kidney Disease Mother         stones    Hypertension Father     Diabetes Sister     Hypertension Sister     Stroke Sister          PHYSICAL EXAMINATION  Visit Vitals  /78   Pulse 84   Temp 98.2 °F (36.8 °C) (Temporal)   Resp 16   SpO2 98%       Pain Assessment  7/5/2022   Location of Pain Neck   Location Modifiers Left   Severity of Pain 4   Quality of Pain Sharp;Dull   Duration of Pain A few days   Frequency of Pain Intermittent   Aggravating Factors Other (Comment)   Aggravating Factors Comment unknown   Limiting Behavior Some   Relieving Factors Nothing           Constitutional:  Well developed, well nourished, in no acute distress. Psychiatric: Affect and mood are appropriate. Integumentary: No rashes or abrasions noted on exposed areas.         SPINE/MUSCULOSKELETAL EXAM    On brief examination: neurologically intact, mildly positive Gallegos on the left.       NCV & EMG Findings:  Nerve Conduction Studies  Anti Sensory Summary Table     Stim Site NR Peak (ms) Norm Peak (ms) O-P Amp (µV) Norm O-P Amp Site1 Site2 Delta-P (ms) Dist (cm) Cornel (m/s) Norm Cornel (m/s)   Left Median Anti Sensory (2nd Digit)   Wrist    2.7 <4 71.5 >13 Wrist 2nd Digit 2.7 14.0 52 >39   Left Radial Anti Sensory (Base 1st Digit)   Wrist    1.9 2.8 60.8 11 Wrist Base 1st Digit 1.9 10.0 53    Left Ulnar Anti Sensory (5th Digit)   Wrist    3.0 <4.0 61.7 >9 Wrist 5th Digit 3.0 14.0 47 >38     Motor Summary Table     Stim Site NR Onset (ms) Norm Onset (ms) O-P Amp (mV) Norm O-P Amp Site1 Site2 Delta-0 (ms) Dist (cm) Cornel (m/s) Norm Cornel (m/s)   Left Median Motor (Abd Poll Brev)   Wrist    2.7 <4.5 8.5 >4.1 Elbow Wrist 4.2 23.0 55 >49   Elbow    6.9  7.2          Left Ulnar Motor (Abd Dig Min)   Wrist    2.6 <3.7 9.7 >7.9 B Elbow Wrist 2.9 20.0 69 >52   B Elbow    5.5  8.2  A Elbow B Elbow 1.7 10.0 59 >43   A Elbow    7.2  7.9            EMG     Side Muscle Nerve Root Ins Act Fibs Psw Amp Dur Poly Recrt Int Lorette Rout Comment   Left Biceps Musculocut C5-6 Nml Nml Nml Nml Nml 0 Nml Nml    Left Triceps Radial C6-7-8 Nml Nml Nml Nml Nml 0 Nml Nml    Left PronatorTeres Median C6-7 Nml Nml Nml Nml Nml 0 Nml Nml    Left Ext Digitorum Radial (Post Int) C7-8 Nml Nml Nml Nml Nml 0 Nml Nml    Left 1stDorInt Ulnar C8-T1 Nml Nml Nml Nml Nml 0 Nml Nml    Left Cervical Parasp Up Rami C1-3 Nml Nml Nml         Left Cervical Parasp Mid Rami C4-6 Nml Nml Nml         Left Cervical Parasp Low Rami C7-8 Nml Nml Nml             Nerve Conduction Studies  Anti Sensory Left/Right Comparison     Stim Site L Lat (ms) R Lat (ms) L-R Lat (ms) L Amp (µV) R Amp (µV) L-R Amp (%) Site1 Site2 L Cornel (m/s) R Cornel (m/s) L-R Cornel (m/s)   Median Anti Sensory (2nd Digit)   Wrist 2.7   71.5   Wrist 2nd Digit 52     Radial Anti Sensory (Base 1st Digit)   Wrist 1.9   60.8   Wrist Base 1st Digit 53     Ulnar Anti Sensory (5th Digit)   Wrist 3.0   61.7   Wrist 5th Digit 47       Motor Left/Right Comparison     Stim Site L Lat (ms) R Lat (ms) L-R Lat (ms) L Amp (mV) R Amp (mV) L-R Amp (%) Site1 Site2 L Cornel (m/s) R Cornel (m/s) L-R Cornel (m/s)   Median Motor (Abd Poll Brev)   Wrist 2.7   8.5   Elbow Wrist 55     Elbow 6.9   7.2          Ulnar Motor (Abd Dig Min)   Wrist 2.6   9.7   B Elbow Wrist 69     B Elbow 5.5   8.2   A Elbow B Elbow 59     A Elbow 7.2   7.9                Waveforms:                     [facility]  OFFICE PROCEDURE PROGRESS NOTE        Chart reviewed for the following:   Cliff MORGAN, have reviewed the History, Physical and updated the Allergic reactions for [address] [place] performed immediately prior to start of procedure:   Cliff MORGAN, have performed the following reviews on [address] [place] [address] prior to the start of the procedure:            * Patient was identified by name and date of birth   * Agreement on procedure being performed was verified  * Risks and Benefits explained to the patient  * Procedure site verified and marked as necessary  * Patient was positioned for comfort  * Consent was signed and verified     Time: 2:30PM      Date of procedure: 7/5/2022    Procedure performed by:  Brenda Tobar MD    Provider accompanied by: None    Patient accompanied by: None    How tolerated by patient: tolerated the procedure well with no complications    Post Procedural Pain Scale: 0 - No Hurt    Comments: none

## 2022-07-05 NOTE — PROGRESS NOTES
Ludy Nguyen presents today for   Chief Complaint   Patient presents with    Neck Pain       Is someone accompanying this pt? no    Is the patient using any DME equipment during OV? no    Depression Screening:  3 most recent PHQ Screens 3/31/2022   PHQ Not Done -   Little interest or pleasure in doing things Nearly every day   Feeling down, depressed, irritable, or hopeless Nearly every day   Total Score PHQ 2 6   Trouble falling or staying asleep, or sleeping too much Not at all   Feeling tired or having little energy Nearly every day   Poor appetite, weight loss, or overeating Nearly every day   Feeling bad about yourself - or that you are a failure or have let yourself or your family down Not at all   Trouble concentrating on things such as school, work, reading, or watching TV Not at all   Moving or speaking so slowly that other people could have noticed; or the opposite being so fidgety that others notice Not at all   Thoughts of being better off dead, or hurting yourself in some way Not at all   PHQ 9 Score 12   How difficult have these problems made it for you to do your work, take care of your home and get along with others Somewhat difficult       Learning Assessment:  Learning Assessment 11/4/2015   PRIMARY LEARNER Patient   HIGHEST LEVEL OF EDUCATION - PRIMARY LEARNER  [address] [address] PRIMARY LEARNER NONE   CO-LEARNER CAREGIVER No   PRIMARY LANGUAGE ENGLISH    NEED No   LEARNER PREFERENCE PRIMARY LISTENING     READING   LEARNING SPECIAL TOPICS No   ANSWERED BY patient   RELATIONSHIP SELF       Abuse Screening:  Abuse Screening Questionnaire 3/15/2019   Do you ever feel afraid of your partner? N   Are you in a relationship with someone who physically or mentally threatens you? N   Is it safe for you to go home? Y       Fall Risk  Fall Risk Assessment, last 12 mths 11/30/2017   Able to walk? Yes   Fall in past 12 months?  Yes   Number of falls in past 12 months 1   Fall with injury? 0       OPIOID RISK TOOL  No flowsheet data found. Coordination of Care:  1. Have you been to the ER, urgent care clinic since your last visit? no  Hospitalized since your last visit? no    2. Have you seen or consulted any other health care providers outside of the [address] [place] [address] since your last visit? no Include any pap smears or colon screening.  no

## 2023-03-01 ENCOUNTER — HOSPITAL ENCOUNTER (OUTPATIENT)
Facility: HOSPITAL | Age: 43
Setting detail: SPECIMEN
Discharge: HOME | End: 2023-03-04

## 2023-03-01 ENCOUNTER — OFFICE VISIT (OUTPATIENT)
Age: 43
End: 2023-03-01

## 2023-03-01 VITALS
DIASTOLIC BLOOD PRESSURE: 112 MMHG | TEMPERATURE: 98.9 F | WEIGHT: 228 LBS | BODY MASS INDEX: 37.99 KG/M2 | HEIGHT: 65 IN | HEART RATE: 68 BPM | RESPIRATION RATE: 16 BRPM | OXYGEN SATURATION: 98 % | SYSTOLIC BLOOD PRESSURE: 168 MMHG

## 2023-03-01 DIAGNOSIS — K59.09 CHRONIC CONSTIPATION: ICD-10-CM

## 2023-03-01 DIAGNOSIS — K58.1 IRRITABLE BOWEL SYNDROME WITH CONSTIPATION: ICD-10-CM

## 2023-03-01 DIAGNOSIS — R31.21 ASYMPTOMATIC MICROSCOPIC HEMATURIA: ICD-10-CM

## 2023-03-01 DIAGNOSIS — F33.1 MODERATE EPISODE OF RECURRENT MAJOR DEPRESSIVE DISORDER (HCC): ICD-10-CM

## 2023-03-01 DIAGNOSIS — I10 ESSENTIAL HYPERTENSION: ICD-10-CM

## 2023-03-01 DIAGNOSIS — Z91.199 MEDICAL NON-COMPLIANCE: ICD-10-CM

## 2023-03-01 DIAGNOSIS — Z11.59 ENCOUNTER FOR HEPATITIS C SCREENING TEST FOR LOW RISK PATIENT: ICD-10-CM

## 2023-03-01 DIAGNOSIS — E66.01 SEVERE OBESITY (BMI 35.0-39.9) WITH COMORBIDITY (HCC): ICD-10-CM

## 2023-03-01 DIAGNOSIS — R10.30 LOWER ABDOMINAL PAIN: Primary | ICD-10-CM

## 2023-03-01 DIAGNOSIS — Z13.1 SCREENING FOR DIABETES MELLITUS (DM): ICD-10-CM

## 2023-03-01 PROCEDURE — 99001 SPECIMEN HANDLING PT-LAB: CPT

## 2023-03-01 RX ORDER — CITALOPRAM 20 MG/1
20 TABLET ORAL DAILY
Qty: 60 TABLET | Refills: 0 | Status: SHIPPED | OUTPATIENT
Start: 2023-03-01

## 2023-03-01 RX ORDER — CETIRIZINE HYDROCHLORIDE 10 MG/1
10 TABLET ORAL DAILY
Qty: 30 TABLET | Refills: 0 | Status: SHIPPED | OUTPATIENT
Start: 2023-03-01

## 2023-03-01 RX ORDER — CIPROFLOXACIN 500 MG/1
500 TABLET, FILM COATED ORAL 2 TIMES DAILY
Qty: 28 TABLET | Refills: 0 | Status: SHIPPED | OUTPATIENT
Start: 2023-03-01 | End: 2023-03-15

## 2023-03-01 RX ORDER — AMLODIPINE BESYLATE 5 MG/1
5 TABLET ORAL DAILY
Qty: 60 TABLET | Refills: 0 | Status: SHIPPED | OUTPATIENT
Start: 2023-03-01

## 2023-03-01 RX ORDER — FLUTICASONE PROPIONATE 50 MCG
2 SPRAY, SUSPENSION (ML) NASAL DAILY
Qty: 16 G | Refills: 0 | Status: SHIPPED | OUTPATIENT
Start: 2023-03-01

## 2023-03-01 NOTE — PATIENT INSTRUCTIONS
DASH Diet: Care Instructions  Your Care Instructions     The DASH diet is an eating plan that can help lower your blood pressure. DASH stands for Dietary Approaches to Stop Hypertension. Hypertension is high blood pressure. The DASH diet focuses on eating foods that are high in calcium, potassium, and magnesium. These nutrients can lower blood pressure. The foods that are highest in these nutrients are fruits, vegetables, low-fat dairy products, nuts, seeds, and legumes. But taking calcium, potassium, and magnesium supplements instead of eating foods that are high in those nutrients does not have the same effect. The DASH diet also includes whole grains, fish, and poultry. The DASH diet is one of several lifestyle changes your doctor may recommend to lower your high blood pressure. Your doctor may also want you to decrease the amount of sodium in your diet. Lowering sodium while following the DASH diet can lower blood pressure even further than just the DASH diet alone. Follow-up care is a key part of your treatment and safety. Be sure to make and go to all appointments, and call your doctor if you are having problems. It's also a good idea to know your test results and keep a list of the medicines you take. How can you care for yourself at home? Following the DASH diet  Eat 4 to 5 servings of fruit each day. A serving is 1 medium-sized piece of fruit, ½ cup chopped or canned fruit, 1/4 cup dried fruit, or 4 ounces (½ cup) of fruit juice. Choose fruit more often than fruit juice. Eat 4 to 5 servings of vegetables each day. A serving is 1 cup of lettuce or raw leafy vegetables, ½ cup of chopped or cooked vegetables, or 4 ounces (½ cup) of vegetable juice. Choose vegetables more often than vegetable juice. Get 2 to 3 servings of low-fat and fat-free dairy each day. A serving is 8 ounces of milk, 1 cup of yogurt, or 1 ½ ounces of cheese. Eat 6 to 8 servings of grains each day.  A serving is 1 slice of bread, 1 ounce of dry cereal, or ½ cup of cooked rice, pasta, or cooked cereal. Try to choose whole-grain products as much as possible. Limit lean meat, poultry, and fish to 2 servings each day. A serving is 3 ounces, about the size of a deck of cards. Eat 4 to 5 servings of nuts, seeds, and legumes (cooked dried beans, lentils, and split peas) each week. A serving is 1/3 cup of nuts, 2 tablespoons of seeds, or ½ cup of cooked beans or peas. Limit fats and oils to 2 to 3 servings each day. A serving is 1 teaspoon of vegetable oil or 2 tablespoons of salad dressing. Limit sweets and added sugars to 5 servings or less a week. A serving is 1 tablespoon jelly or jam, ½ cup sorbet, or 1 cup of lemonade. Eat less than 2,300 milligrams (mg) of sodium a day. If you limit your sodium to 1,500 mg a day, you can lower your blood pressure even more. Be aware that all of these are the suggested number of servings for people who eat 1,800 to 2,000 calories a day. Your recommended number of servings may be different if you need more or fewer calories. Tips for success  Start small. Do not try to make dramatic changes to your diet all at once. You might feel that you are missing out on your favorite foods and then be more likely to not follow the plan. Make small changes, and stick with them. Once those changes become habit, add a few more changes. Try some of the following:  Make it a goal to eat a fruit or vegetable at every meal and at snacks. This will make it easy to get the recommended amount of fruits and vegetables each day. Try yogurt topped with fruit and nuts for a snack or healthy dessert. Add lettuce, tomato, cucumber, and onion to sandwiches. Combine a ready-made pizza crust with low-fat mozzarella cheese and lots of vegetable toppings. Try using tomatoes, squash, spinach, broccoli, carrots, cauliflower, and onions.   Have a variety of cut-up vegetables with a low-fat dip as an appetizer instead of chips and dip.  Sprinkle sunflower seeds or chopped almonds over salads. Or try adding chopped walnuts or almonds to cooked vegetables. Try some vegetarian meals using beans and peas. Add garbanzo or kidney beans to salads. Make burritos and tacos with mashed guardado beans or black beans. Where can you learn more? Go to https://JamKazampepiceweb.healthHackerEarth. org and sign in to your M[employer] account. Enter B931 in the BackType box to learn more about \"DASH Diet: Care Instructions. \"     If you do not have an account, please click on the \"Sign Up Now\" link. Current as of: January 10, 2022               Content Version: 13.4  © 0605-5058 [employer]. Care instructions adapted under license by [facility] ([facility]). If you have questions about a medical condition or this instruction, always ask your healthcare professional. [place] [address] any warranty or liability for your use of this information. FOOD RESOURCES    [facility]  What they offer: Assistance with finding a food pantry, soup kitchen or resource near you. Website: https://Secucloud.org/get-help/community-resources/  Provide instructions for assistance with Farrah Louise (Food [place]) application on this site. SNAP ([address]  What they offer: SNAP is used like cash to buy eligible food items from authorized retailers. Apply for benefits online: Ray.  Apply for benefits by telephone: [phone]    Meals on Wheels  What they offer: Meals on Wheels is a program that delivers meals to individuals at home who are unable to purchase or prepare their own meals. Website: https://feedmore. org/how-we-help/meals-on-wheels/  Requirements can vary by program and areas served.    To apply:  [address] [place] [address] Avenue: [phone] (RQI -Fri 8:30 a.m. to 4:30 p.m.)  Coverage Area:  [place], [place], [place], [place], [address] [facility] [address]  Website: www.[facility].org/contact-us/  Brian Saundra [phone]   Coverage Areas: [address] [facility][1], [address] [place] [address]. [place] [address]  What they offer:  [employer] provides comprehensive services to the disadvantaged/low-income community, homebound seniors and homeless in [facility]. Phone Number: [phone]  Services:  Food pantry, [address] kitchen, Senior Grocery Delivery Program, Food Bank, Provides assistance with completing [employer]. Website: https://www.Loaded Commerce/. org/services    Need additional resources? Call 211 or Find Help: https://www. findhelp.org/

## 2023-03-01 NOTE — PROGRESS NOTES
Milagros Whitley, 36 y.o.,  female    SUBJECTIVE  Lower abdominal pain x 1 day    C/o lower abdominal pain, and urinary urgency, dysuria. No flank pain, fever, nausea, vomiting, has known IBS-C but pain this time feels worse. She is amenorrheic after endometrial ablation years ago, also remote hx appendectomy. HTN- was previously on norvasc, says ran out of meds, lost to ff-up    Depression- says ongoing for quite sometime, was previously on zoloft and cymbalta, says does not like how she felt on meds      ROS:  See HPI, all others negative        Patient Active Problem List   Diagnosis    Tension headache    Secondary dysmenorrhea    Essential hypertension    Tobacco use    Sleeping difficulty    Abnormal uterine bleeding (AUB)    Mild episode of recurrent major depressive disorder (HCC)    Irritable bowel syndrome with constipation    Migraine with aura and without status migrainosus, not intractable    Recurrent depression (HCC)    Chronic constipation    Severe obesity (BMI 35.0-39. 9) with comorbidity (HCC)       Current Outpatient Medications   Medication Sig Dispense Refill    amLODIPine (NORVASC) 5 MG tablet Take 1 tablet by mouth daily 60 tablet 0    cetirizine (ZYRTEC) 10 MG tablet Take 1 tablet by mouth daily 30 tablet 0    fluticasone (FLONASE) 50 MCG/ACT nasal spray 2 sprays by Nasal route daily 16 g 0    ciprofloxacin (CIPRO) 500 MG tablet Take 1 tablet by mouth 2 times daily for 14 days 28 tablet 0    citalopram (CELEXA) 20 MG tablet Take 1 tablet by mouth daily 60 tablet 0     No current facility-administered medications for this visit. Allergies   Allergen Reactions    Carrot Swelling     Mouth and gum swelling    Penicillins Other (See Comments)     Patient states not allergic but gets candidiasis from it.      Strawberry Hives and Swelling       Past Medical History:   Diagnosis Date    Anxiety and depression     Bruises easily     Cervical high risk human papillomavirus (HPV) DNA test positive 03/2017    rpt pap smear 3/2018    Chronic constipation     dr. Sanjuanita moctezuma    Contact dermatitis and other eczema, due to unspecified cause     Depression 12/2/2015    Essential hypertension 12/30/2015    GERD (gastroesophageal reflux disease) 9/13/2013    Headache(784.0)     Heavy menses     Hx of migraines     Irregular menses     Ovarian cyst     Pelvic pain        Social History     Socioeconomic History    Marital status: Single     Spouse name: Not on file    Number of children: Not on file    Years of education: Not on file    Highest education level: Not on file   Occupational History    Not on file   Tobacco Use    Smoking status: Every Day     Packs/day: 0.08     Types: Cigarettes    Smokeless tobacco: Never    Tobacco comments:     Quit smoking: smokes cigars - 1.5 a day   Substance and Sexual Activity    Alcohol use: Yes    Drug use: No    Sexual activity: Not on file   Other Topics Concern    Not on file   Social History Narrative    Not on file     Social Determinants of Health     Financial Resource Strain: Not on file   Food Insecurity: Not on file   Transportation Needs: Not on file   Physical Activity: Not on file   Stress: Not on file   Social Connections: Not on file   Intimate Partner Violence: Not on file   Housing Stability: Not on file       Family History   Problem Relation Age of Onset    Diabetes Sister     Kidney Disease Mother         stones    Hypertension Mother     Diabetes Mother     Stroke Sister     Hypertension Sister     Hypertension Father          OBJECTIVE    Physical Exam:     BP (!) 168/112 (Site: Left Upper Arm, Position: Sitting, Cuff Size: Medium Adult)   Pulse 68   Temp 98.9 °F (37.2 °C) (Temporal)   Resp 16   Ht 5' 5\" (1.651 m)   Wt 228 lb (103.4 kg)   SpO2 98%   BMI 37.94 kg/m²     General: alert, in pain  Neck: supple, no adenopathy palpated  CVS: normal rate, regular rhythm, distinct S1 and S2  Lungs:clear to ausculation bilaterally, no crackles, wheezing or rhonchi noted  Abdomen: normoactive bowel sounds, soft, non-tender, no CVA tenderness, no rebound  Extremities: no edema, no cyanosis, MSK grossly normal  Skin: warm, no lesions, rashes noted  Psych: crying    Results for orders placed or performed in visit on 03/01/23   AMB POC URINALYSIS DIP STICK AUTO W/ MICRO   Result Value Ref Range    Color (UA POC) Yellow     Clarity (UA POC) Cloudy     Glucose, Urine, POC Negative Negative    Bilirubin, Urine, POC Negative Negative    Ketones, Urine, POC Negative Negative    Specific Gravity, Urine, POC 1.020 1.001 - 1.035    Blood (UA POC) 2+ Negative    pH, Urine, POC 5.5 4.6 - 8.0    Protein, Urine, POC Trace Negative    Urobilinogen, POC 0.2 mg/dL     Nitrite, Urine, POC Negative Negative    Leukocyte Esterase, Urine, POC Negative Negative       ASSESSMENT/PLAN  Elio was seen today for abdominal pain, other and headache. Diagnoses and all orders for this visit:    Lower abdominal pain  UA 2+ blood  Soft/benign abdominal exam  Treat with cipro, CT abdomen to eval hematuria  -     AMB POC URINALYSIS DIP STICK AUTO W/ MICRO  -     Culture, Urine; Future  -     CT ABDOMEN WO CONTRAST Additional Contrast? Radiologist Recommendation; Future    Screening for diabetes mellitus (DM)  -     Comprehensive Metabolic Panel; Future  -     Hemoglobin A1C; Future    Essential hypertension  Discussed complications of uncontrolled HTN   Resume norvasc  -     CBC with Auto Differential; Future  -     Comprehensive Metabolic Panel; Future  -     Lipid Panel; Future  -     TSH; Future    Severe obesity (BMI 35.0-39. 9) with comorbidity (HCC)    Moderate episode of recurrent major depressive disorder ([facility] [address]  Discussed options  Trial celexa    Encounter for hepatitis C screening test for low risk patient  -     Hepatitis C Antibody; Future    Asymptomatic microscopic hematuria  -     CT ABDOMEN WO CONTRAST Additional Contrast? Radiologist Recommendation;  Future    Medical non-compliance    Irritable bowel syndrome with constipation    Chronic constipation    Other orders  -     amLODIPine (NORVASC) 5 MG tablet; Take 1 tablet by mouth daily  -     cetirizine (ZYRTEC) 10 MG tablet; Take 1 tablet by mouth daily  -     fluticasone (FLONASE) 50 MCG/ACT nasal spray; 2 sprays by Nasal route daily  -     ciprofloxacin (CIPRO) 500 MG tablet; Take 1 tablet by mouth 2 times daily for 14 days  -     citalopram (CELEXA) 20 MG tablet; Take 1 tablet by mouth daily        Ff-up in 6 weeks or sooner prn    Patient/guardian understands plan of care. Patient has provided input and agrees with goals. Future labs to be discussed on next visit. Patient understands plan of care. Patient has provided input and agrees with goals.

## 2023-03-01 NOTE — PROGRESS NOTES
1. \"Have you been to the ER, urgent care clinic since your last visit? Hospitalized since your last visit? \" No    2. \"Have you seen or consulted any other health care providers outside of the [address] [place] [address] since your last visit? \" No     3. For patients aged 39-70: Has the patient had a colonoscopy / FIT/ Cologuard? NA - based on age      If the patient is female:    4. For patients aged 41-77: Has the patient had a mammogram within the past 2 years? No      5. For patients aged 21-65: Has the patient had a pap smear?  No

## 2023-03-01 NOTE — LETTER
March 1, 2023       Elio Shelton YOB: 1980   [address] [facility] [id number] Date of Visit:  3/1/2023       To Whom It May Concern:    Please excuse from work from dates 3/1/2023- 3/5/2023  Anticipate Return to work 3/6/2023    If you have any questions or concerns, please don't hesitate to call.     Sincerely,        Matthew Mcdaniels MD

## 2023-03-02 LAB
BILIRUBIN, URINE, POC: NEGATIVE
BLOOD URINE, POC: ABNORMAL
GLUCOSE URINE, POC: NEGATIVE
KETONES, URINE, POC: NEGATIVE
LABCORP SPECIMEN COLLECTION: NORMAL
LEUKOCYTE ESTERASE, URINE, POC: NEGATIVE
NITRITE, URINE, POC: NEGATIVE
PH, URINE, POC: 5.5 (ref 4.6–8)
PROTEIN,URINE, POC: ABNORMAL
SPECIFIC GRAVITY, URINE, POC: 1.02 (ref 1–1.03)
URINALYSIS CLARITY, POC: ABNORMAL
URINALYSIS COLOR, POC: YELLOW
UROBILINOGEN, POC: ABNORMAL

## 2023-03-05 LAB — BACTERIA UR CULT: ABNORMAL

## 2023-03-27 RX ORDER — FLUTICASONE PROPIONATE 50 MCG
SPRAY, SUSPENSION (ML) NASAL
Qty: 1 EACH | Refills: 5 | Status: SHIPPED | OUTPATIENT
Start: 2023-03-27

## 2023-03-27 RX ORDER — CETIRIZINE HYDROCHLORIDE 10 MG/1
TABLET ORAL
Qty: 90 TABLET | Refills: 0 | Status: SHIPPED | OUTPATIENT
Start: 2023-03-27

## 2023-03-27 RX ORDER — AMLODIPINE BESYLATE 5 MG/1
TABLET ORAL
Qty: 30 TABLET | Refills: 1 | Status: SHIPPED | OUTPATIENT
Start: 2023-03-27

## 2023-04-25 RX ORDER — AMLODIPINE BESYLATE 5 MG/1
TABLET ORAL
Qty: 90 TABLET | Refills: 0 | Status: SHIPPED | OUTPATIENT
Start: 2023-04-25

## 2023-05-04 DIAGNOSIS — R10.30 LOWER ABDOMINAL PAIN: ICD-10-CM

## 2023-09-20 NOTE — TELEPHONE ENCOUNTER
This pharmacy faxed over request for the following prescriptions to be filled:    Medication requested : amLODIPine (NORVASC) 5 MG tablet        Qty 90  citalopram (CELEXA) 20 MG tablet        Qty 60  PCP:  [address] [place] [address] or [facility]   Mail order or Local pharmacy [facility]     Scheduled appointment if not seen by current providers in office: LOV 3/1/2023 FU 9/28/2023

## 2023-09-21 RX ORDER — CITALOPRAM 20 MG/1
20 TABLET ORAL DAILY
Qty: 7 TABLET | Refills: 0 | Status: SHIPPED | OUTPATIENT
Start: 2023-09-21

## 2023-09-27 NOTE — PROGRESS NOTES
1. \"Have you been to the ER, urgent care clinic since your last visit? Hospitalized since your last visit? \" No   ER 08/23/2023 kidney stone  2. \"Have you seen or consulted any other health care providers outside of the [address] [place] [address] since your last visit? \" No       3. For patients aged 43-73: Has the patient had a colonoscopy / FIT/ Cologuard? NA - based on age      If the patient is female:    4. For patients aged 43-66: Has the patient had a mammogram within the past 2 years? No      5. For patients aged 21-65: Has the patient had a pap smear?  Yes - no Care Gap present

## 2023-09-28 ENCOUNTER — OFFICE VISIT (OUTPATIENT)
Age: 43
End: 2023-09-28
Payer: COMMERCIAL

## 2023-09-28 VITALS
HEART RATE: 63 BPM | HEIGHT: 65 IN | BODY MASS INDEX: 37.32 KG/M2 | SYSTOLIC BLOOD PRESSURE: 142 MMHG | OXYGEN SATURATION: 100 % | RESPIRATION RATE: 16 BRPM | TEMPERATURE: 97.3 F | WEIGHT: 224 LBS | DIASTOLIC BLOOD PRESSURE: 94 MMHG

## 2023-09-28 DIAGNOSIS — Z72.0 TOBACCO USE: ICD-10-CM

## 2023-09-28 DIAGNOSIS — F33.9 RECURRENT DEPRESSION (HCC): ICD-10-CM

## 2023-09-28 DIAGNOSIS — F41.9 ANXIETY: ICD-10-CM

## 2023-09-28 DIAGNOSIS — Z12.31 BREAST CANCER SCREENING BY MAMMOGRAM: ICD-10-CM

## 2023-09-28 DIAGNOSIS — I10 ESSENTIAL HYPERTENSION: Primary | ICD-10-CM

## 2023-09-28 DIAGNOSIS — E66.01 SEVERE OBESITY (BMI 35.0-39.9) WITH COMORBIDITY (HCC): ICD-10-CM

## 2023-09-28 DIAGNOSIS — Z91.199 MEDICALLY NONCOMPLIANT: ICD-10-CM

## 2023-09-28 PROCEDURE — 99214 OFFICE O/P EST MOD 30 MIN: CPT | Performed by: FAMILY MEDICINE

## 2023-09-28 PROCEDURE — 3077F SYST BP >= 140 MM HG: CPT | Performed by: FAMILY MEDICINE

## 2023-09-28 PROCEDURE — 3080F DIAST BP >= 90 MM HG: CPT | Performed by: FAMILY MEDICINE

## 2023-09-28 RX ORDER — CITALOPRAM 20 MG/1
20 TABLET ORAL DAILY
Qty: 30 TABLET | Refills: 0 | Status: SHIPPED | OUTPATIENT
Start: 2023-09-28

## 2023-09-28 RX ORDER — AMLODIPINE BESYLATE 5 MG/1
5 TABLET ORAL DAILY
Qty: 30 TABLET | Refills: 0 | Status: SHIPPED | OUTPATIENT
Start: 2023-09-28

## 2023-09-28 SDOH — ECONOMIC STABILITY: FOOD INSECURITY: WITHIN THE PAST 12 MONTHS, YOU WORRIED THAT YOUR FOOD WOULD RUN OUT BEFORE YOU GOT MONEY TO BUY MORE.: SOMETIMES TRUE

## 2023-09-28 SDOH — ECONOMIC STABILITY: FOOD INSECURITY: WITHIN THE PAST 12 MONTHS, THE FOOD YOU BOUGHT JUST DIDN'T LAST AND YOU DIDN'T HAVE MONEY TO GET MORE.: SOMETIMES TRUE

## 2023-09-28 SDOH — ECONOMIC STABILITY: INCOME INSECURITY: HOW HARD IS IT FOR YOU TO PAY FOR THE VERY BASICS LIKE FOOD, HOUSING, MEDICAL CARE, AND HEATING?: HARD

## 2023-09-28 SDOH — ECONOMIC STABILITY: HOUSING INSECURITY
IN THE LAST 12 MONTHS, WAS THERE A TIME WHEN YOU DID NOT HAVE A STEADY PLACE TO SLEEP OR SLEPT IN A SHELTER (INCLUDING NOW)?: NO

## 2023-09-28 ASSESSMENT — PATIENT HEALTH QUESTIONNAIRE - PHQ9
SUM OF ALL RESPONSES TO PHQ9 QUESTIONS 1 & 2: 0
2. FEELING DOWN, DEPRESSED OR HOPELESS: 0
SUM OF ALL RESPONSES TO PHQ QUESTIONS 1-9: 0
1. LITTLE INTEREST OR PLEASURE IN DOING THINGS: 0
SUM OF ALL RESPONSES TO PHQ QUESTIONS 1-9: 0

## 2023-09-28 NOTE — PROGRESS NOTES
Avi Quitter, 39 y.o.,  female    SUBJECTIVE  Ff-up     HTN-ran out of  norvasc. Was supposed to be seen in April, unsure why she missed appt. She continues to smoke. Says has not been checking BP at home. requesting med refills, has not done labs either    Depression/anxiety- says ran out of celexa, continues to have difficulty with feeling down, anxious. She is open to talk therapy. ROS:  See HPI, all others negative        Patient Active Problem List   Diagnosis Code    Tobacco use Z72.0    Tension headache G44.209    Recurrent depression (HCC) F33.9    Essential hypertension I10    Chronic constipation K59.09    Abnormal uterine bleeding (AUB) N93.9    Secondary dysmenorrhea N94.5    Severe obesity (BMI 35.0-39. 9) with comorbidity (HCC) E66.01    Mild episode of recurrent major depressive disorder ([address]) F33.0    Severe obesity (BMI 35.0-39. 9) E66.01    Migraine with aura and without status migrainosus, not intractable G43.109    Sleeping difficulty G47.9    Irritable bowel syndrome with constipation K58.1         Current Outpatient Medications:     TURMERIC PO, Take by mouth daily, Disp: , Rfl:     amLODIPine (NORVASC) 5 MG tablet, Take 1 tablet by mouth daily, Disp: 30 tablet, Rfl: 0    citalopram (CELEXA) 20 MG tablet, Take 1 tablet by mouth daily, Disp: 30 tablet, Rfl: 0    fluticasone (FLONASE) 50 MCG/ACT nasal spray, SPRAY 2 SPRAYS BY NASAL ROUTE DAILY AS DIRECTED, Disp: 1 each, Rfl: 5    cetirizine (ZYRTEC) 10 MG tablet, TAKE 1 TABLET BY MOUTH EVERY DAY, Disp: 90 tablet, Rfl: 0      Allergies   Allergen Reactions    Carrot Swelling     Mouth and gum swelling      Pcn [Penicillins] Other (comments)     Patient states not allergic but gets candidiasis from it.      Strawberry Hives and Swelling       Past Medical History:   Diagnosis Date    Anxiety and depression     Bruises easily     Cervical high risk human papillomavirus (HPV) DNA test positive 03/2017    rpt pap smear 3/2018    Chronic

## 2023-10-31 ENCOUNTER — TELEMEDICINE (OUTPATIENT)
Age: 43
End: 2023-10-31
Payer: COMMERCIAL

## 2023-10-31 DIAGNOSIS — F33.9 RECURRENT DEPRESSION (HCC): ICD-10-CM

## 2023-10-31 DIAGNOSIS — E66.01 SEVERE OBESITY (BMI 35.0-39.9) WITH COMORBIDITY (HCC): ICD-10-CM

## 2023-10-31 DIAGNOSIS — F41.9 ANXIETY: ICD-10-CM

## 2023-10-31 DIAGNOSIS — I10 ESSENTIAL HYPERTENSION: Primary | ICD-10-CM

## 2023-10-31 DIAGNOSIS — Z72.0 TOBACCO USE: ICD-10-CM

## 2023-10-31 PROCEDURE — 99214 OFFICE O/P EST MOD 30 MIN: CPT | Performed by: FAMILY MEDICINE

## 2023-11-02 NOTE — PROGRESS NOTES
Paola Gray, was evaluated through a synchronous (real-time) audio-video encounter. The patient (or guardian if applicable) is aware that this is a billable service, which includes applicable co-pays. This Virtual Visit was conducted with patient's (and/or legal guardian's) consent. Patient identification was verified, and a caregiver was present when appropriate. The patient was located at Home: [facility] [id number]  Provider was located at [facility] [address] [place],  [address] [facility] [place]           --Brooke Robertson MD on 11/2/2023 at 9:05 AM    An electronic signature was used to authenticate this note. 712  Subjective:   Paola Gray is a 39 y.o. female who was seen for No chief complaint on file. HTN-has resumed norvasc, however unable to get labs drawn yet nor is she able to check BP at home. Says having transportation issues this time. Depression/anxiety- says back on celexa, with some improvement in anxiety and low mood. She is trying to schedule with talk therapist.     Prior to Admission medications    Medication Sig Start Date End Date Taking? Authorizing Provider   amLODIPine (NORVASC) 5 MG tablet Take 1 tablet by mouth daily 9/28/23  Yes Brooke Robertson MD   citalopram (CELEXA) 20 MG tablet Take 1 tablet by mouth daily 9/28/23  Yes Brooke Robertson MD   fluticasone (FLONASE) 50 MCG/ACT nasal spray SPRAY 2 SPRAYS BY NASAL ROUTE DAILY AS DIRECTED 3/27/23  Yes Johana Munroe MD   cetirizine (ZYRTEC) 10 MG tablet TAKE 1 TABLET BY MOUTH EVERY DAY 3/27/23  Yes Brooke Robertson MD   TURMERIC PO Take by mouth daily    ProviderDali MD     Allergies   Allergen Reactions    Carrot Swelling     Mouth and gum swelling    Penicillins Other (See Comments)     Patient states not allergic but gets candidiasis from it. Strawberry Hives and Swelling           Review of Systems        Objective:    There were

## 2023-11-02 NOTE — TELEPHONE ENCOUNTER
This pharmacy faxed over request for the following prescriptions to be filled:    Medication requested : citalopram (CELEXA) 20 MG tablet        Qty 90   PCP: [address] or [facility]  Mail order or Local pharmacy [facility] [facility]     Scheduled appointment if not seen by current providers in office: LOV 10/31/2023 12/4/2023

## 2023-11-04 ENCOUNTER — HOSPITAL ENCOUNTER (OUTPATIENT)
Facility: HOSPITAL | Age: 43
Discharge: HOME | End: 2023-11-07
Payer: COMMERCIAL

## 2023-11-04 DIAGNOSIS — Z13.1 SCREENING FOR DIABETES MELLITUS (DM): ICD-10-CM

## 2023-11-04 DIAGNOSIS — I10 ESSENTIAL HYPERTENSION: ICD-10-CM

## 2023-11-04 DIAGNOSIS — Z11.59 ENCOUNTER FOR HEPATITIS C SCREENING TEST FOR LOW RISK PATIENT: ICD-10-CM

## 2023-11-04 LAB
ALBUMIN SERPL-MCNC: 3.6 G/DL (ref 3.4–5)
ALBUMIN/GLOB SERPL: 1 (ref 0.8–1.7)
ALP SERPL-CCNC: 101 U/L (ref 45–117)
ALT SERPL-CCNC: 23 U/L (ref 13–56)
ANION GAP SERPL CALC-SCNC: 3 MMOL/L (ref 3–18)
AST SERPL-CCNC: 14 U/L (ref 10–38)
BASOPHILS # BLD: 0 K/UL (ref 0–0.1)
BASOPHILS NFR BLD: 1 % (ref 0–2)
BILIRUB SERPL-MCNC: 0.3 MG/DL (ref 0.2–1)
BUN SERPL-MCNC: 12 MG/DL (ref 7–18)
BUN/CREAT SERPL: 17 (ref 12–20)
CALCIUM SERPL-MCNC: 8.8 MG/DL (ref 8.5–10.1)
CHLORIDE SERPL-SCNC: 109 MMOL/L (ref 100–111)
CHOLEST SERPL-MCNC: 122 MG/DL
CO2 SERPL-SCNC: 29 MMOL/L (ref 21–32)
CREAT SERPL-MCNC: 0.71 MG/DL (ref 0.6–1.3)
DIFFERENTIAL METHOD BLD: ABNORMAL
EOSINOPHIL # BLD: 0.2 K/UL (ref 0–0.4)
EOSINOPHIL NFR BLD: 3 % (ref 0–5)
ERYTHROCYTE [DISTWIDTH] IN BLOOD BY AUTOMATED COUNT: 13.8 % (ref 11.6–14.5)
EST. AVERAGE GLUCOSE BLD GHB EST-MCNC: 111 MG/DL
GLOBULIN SER CALC-MCNC: 3.7 G/DL (ref 2–4)
GLUCOSE SERPL-MCNC: 98 MG/DL (ref 74–99)
HBA1C MFR BLD: 5.5 % (ref 4.2–5.6)
HCT VFR BLD AUTO: 46.8 % (ref 35–45)
HDLC SERPL-MCNC: 46 MG/DL (ref 40–60)
HDLC SERPL: 2.7 (ref 0–5)
HGB BLD-MCNC: 14.5 G/DL (ref 12–16)
IMM GRANULOCYTES # BLD AUTO: 0 K/UL (ref 0–0.04)
IMM GRANULOCYTES NFR BLD AUTO: 0 % (ref 0–0.5)
LDLC SERPL CALC-MCNC: 66 MG/DL (ref 0–100)
LIPID PANEL: NORMAL
LYMPHOCYTES # BLD: 2.2 K/UL (ref 0.9–3.6)
LYMPHOCYTES NFR BLD: 28 % (ref 21–52)
MCH RBC QN AUTO: 26.4 PG (ref 24–34)
MCHC RBC AUTO-ENTMCNC: 31 G/DL (ref 31–37)
MCV RBC AUTO: 85.2 FL (ref 78–100)
MONOCYTES # BLD: 0.5 K/UL (ref 0.05–1.2)
MONOCYTES NFR BLD: 6 % (ref 3–10)
NEUTS SEG # BLD: 4.9 K/UL (ref 1.8–8)
NEUTS SEG NFR BLD: 62 % (ref 40–73)
NRBC # BLD: 0 K/UL (ref 0–0.01)
NRBC BLD-RTO: 0 PER 100 WBC
PLATELET # BLD AUTO: 205 K/UL (ref 135–420)
PMV BLD AUTO: 12.5 FL (ref 9.2–11.8)
POTASSIUM SERPL-SCNC: 4.7 MMOL/L (ref 3.5–5.5)
PROT SERPL-MCNC: 7.3 G/DL (ref 6.4–8.2)
RBC # BLD AUTO: 5.49 M/UL (ref 4.2–5.3)
SODIUM SERPL-SCNC: 141 MMOL/L (ref 136–145)
TRIGL SERPL-MCNC: 50 MG/DL
TSH SERPL DL<=0.05 MIU/L-ACNC: 0.87 UIU/ML (ref 0.36–3.74)
VLDLC SERPL CALC-MCNC: 10 MG/DL
WBC # BLD AUTO: 7.9 K/UL (ref 4.6–13.2)

## 2023-11-04 PROCEDURE — 80053 COMPREHEN METABOLIC PANEL: CPT

## 2023-11-04 PROCEDURE — 83036 HEMOGLOBIN GLYCOSYLATED A1C: CPT

## 2023-11-04 PROCEDURE — 36415 COLL VENOUS BLD VENIPUNCTURE: CPT

## 2023-11-04 PROCEDURE — 80061 LIPID PANEL: CPT

## 2023-11-04 PROCEDURE — 84443 ASSAY THYROID STIM HORMONE: CPT

## 2023-11-04 PROCEDURE — 86803 HEPATITIS C AB TEST: CPT

## 2023-11-04 PROCEDURE — 85025 COMPLETE CBC W/AUTO DIFF WBC: CPT

## 2023-11-06 LAB
HCV AB SER IA-ACNC: 0.06 INDEX
HCV AB SERPL QL IA: NEGATIVE
HEPATITIS C COMMENT: NORMAL

## 2023-11-06 RX ORDER — CITALOPRAM 20 MG/1
20 TABLET ORAL DAILY
Qty: 30 TABLET | Refills: 0 | Status: SHIPPED | OUTPATIENT
Start: 2023-11-06

## 2023-12-04 ENCOUNTER — OFFICE VISIT (OUTPATIENT)
Age: 43
End: 2023-12-04
Payer: COMMERCIAL

## 2023-12-04 VITALS
WEIGHT: 219 LBS | SYSTOLIC BLOOD PRESSURE: 144 MMHG | RESPIRATION RATE: 16 BRPM | HEIGHT: 65 IN | OXYGEN SATURATION: 98 % | BODY MASS INDEX: 36.49 KG/M2 | TEMPERATURE: 98.7 F | DIASTOLIC BLOOD PRESSURE: 96 MMHG | HEART RATE: 64 BPM

## 2023-12-04 DIAGNOSIS — I10 ESSENTIAL HYPERTENSION: ICD-10-CM

## 2023-12-04 DIAGNOSIS — J01.00 ACUTE NON-RECURRENT MAXILLARY SINUSITIS: Primary | ICD-10-CM

## 2023-12-04 DIAGNOSIS — E66.01 SEVERE OBESITY (BMI 35.0-39.9) WITH COMORBIDITY (HCC): ICD-10-CM

## 2023-12-04 DIAGNOSIS — F33.9 RECURRENT DEPRESSION (HCC): ICD-10-CM

## 2023-12-04 DIAGNOSIS — Z72.0 TOBACCO USE: ICD-10-CM

## 2023-12-04 PROCEDURE — 3080F DIAST BP >= 90 MM HG: CPT | Performed by: FAMILY MEDICINE

## 2023-12-04 PROCEDURE — 99214 OFFICE O/P EST MOD 30 MIN: CPT | Performed by: FAMILY MEDICINE

## 2023-12-04 PROCEDURE — 3077F SYST BP >= 140 MM HG: CPT | Performed by: FAMILY MEDICINE

## 2023-12-04 RX ORDER — CITALOPRAM 20 MG/1
20 TABLET ORAL DAILY
Qty: 30 TABLET | Refills: 0 | Status: SHIPPED | OUTPATIENT
Start: 2023-12-04

## 2023-12-04 RX ORDER — DOXYCYCLINE HYCLATE 100 MG
100 TABLET ORAL 2 TIMES DAILY
Qty: 14 TABLET | Refills: 0 | Status: SHIPPED | OUTPATIENT
Start: 2023-12-04 | End: 2023-12-11

## 2023-12-04 RX ORDER — CETIRIZINE HYDROCHLORIDE 10 MG/1
10 TABLET ORAL DAILY
Qty: 90 TABLET | Refills: 1 | Status: SHIPPED | OUTPATIENT
Start: 2023-12-04

## 2023-12-04 RX ORDER — FLUCONAZOLE 150 MG/1
150 TABLET ORAL ONCE
Qty: 1 TABLET | Refills: 0 | Status: SHIPPED | OUTPATIENT
Start: 2023-12-04 | End: 2023-12-04

## 2023-12-04 RX ORDER — AMLODIPINE BESYLATE 5 MG/1
5 TABLET ORAL DAILY
Qty: 30 TABLET | Refills: 0 | Status: CANCELLED | OUTPATIENT
Start: 2023-12-04

## 2023-12-04 RX ORDER — FLUTICASONE PROPIONATE 50 MCG
SPRAY, SUSPENSION (ML) NASAL
Qty: 1 EACH | Refills: 5 | Status: SHIPPED | OUTPATIENT
Start: 2023-12-04

## 2023-12-04 RX ORDER — AMLODIPINE BESYLATE 10 MG/1
10 TABLET ORAL DAILY
Qty: 90 TABLET | Refills: 0 | Status: SHIPPED | OUTPATIENT
Start: 2023-12-04

## 2023-12-04 RX ORDER — CETIRIZINE HYDROCHLORIDE 10 MG/1
10 TABLET ORAL DAILY
Qty: 90 TABLET | Refills: 0 | Status: CANCELLED | OUTPATIENT
Start: 2023-12-04

## 2023-12-04 SDOH — ECONOMIC STABILITY: INCOME INSECURITY: HOW HARD IS IT FOR YOU TO PAY FOR THE VERY BASICS LIKE FOOD, HOUSING, MEDICAL CARE, AND HEATING?: NOT HARD AT ALL

## 2023-12-04 SDOH — ECONOMIC STABILITY: FOOD INSECURITY: WITHIN THE PAST 12 MONTHS, YOU WORRIED THAT YOUR FOOD WOULD RUN OUT BEFORE YOU GOT MONEY TO BUY MORE.: NEVER TRUE

## 2023-12-04 SDOH — ECONOMIC STABILITY: FOOD INSECURITY: WITHIN THE PAST 12 MONTHS, THE FOOD YOU BOUGHT JUST DIDN'T LAST AND YOU DIDN'T HAVE MONEY TO GET MORE.: NEVER TRUE

## 2023-12-04 NOTE — PROGRESS NOTES
1. \"Have you been to the ER, urgent care clinic since your last visit? Hospitalized since your last visit? \" No    2. \"Have you seen or consulted any other health care providers outside of the [address] [place] [address] since your last visit? \" No     3. For patients aged 43-73: Has the patient had a colonoscopy / FIT/ Cologuard? Yes - Care Gap present. Most recent result on file      If the patient is female:    4. For patients aged 43-66: Has the patient had a mammogram within the past 2 years? No-pt has to reschedule      5. For patients aged 21-65: Has the patient had a pap smear?  No- pt reports has to be rescheduled
celexa  Advised to pursue talk therapy    Anxiety  See above    Tobacco use  Advised cessation    Severe obesity (BMI 35.0-39. 9) with comorbidity ([address])    Acute non-recurrent maxillary sinusitis  Resume flonase, zyrtec  Add doxy, work excuse provided    Other orders  -     citalopram (CELEXA) 20 MG tablet; Take 1 tablet by mouth daily  -     fluticasone (FLONASE) 50 MCG/ACT nasal spray; SPRAY 2 SPRAYS BY NASAL ROUTE DAILY AS DIRECTED  -     cetirizine (ZYRTEC) 10 MG tablet; Take 1 tablet by mouth daily  -     amLODIPine (NORVASC) 10 MG tablet; Take 1 tablet by mouth daily  -     doxycycline hyclate (VIBRA-TABS) 100 MG tablet; Take 1 tablet by mouth 2 times daily for 7 days  -     fluconazole (DIFLUCAN) 150 MG tablet; Take 1 tablet by mouth once for 1 dose      Ff-up in 4 weeks or sooner prn    Patient understands plan of care. Patient has provided input and agrees with goals.

## 2024-02-05 ENCOUNTER — OFFICE VISIT (OUTPATIENT)
Age: 44
End: 2024-02-05
Payer: COMMERCIAL

## 2024-02-05 VITALS
WEIGHT: 226 LBS | HEART RATE: 88 BPM | HEIGHT: 65 IN | DIASTOLIC BLOOD PRESSURE: 80 MMHG | BODY MASS INDEX: 37.65 KG/M2 | RESPIRATION RATE: 18 BRPM | SYSTOLIC BLOOD PRESSURE: 142 MMHG | TEMPERATURE: 98 F | OXYGEN SATURATION: 98 %

## 2024-02-05 DIAGNOSIS — Z72.0 TOBACCO USE: ICD-10-CM

## 2024-02-05 DIAGNOSIS — L81.9 HYPERPIGMENTATION: ICD-10-CM

## 2024-02-05 DIAGNOSIS — F41.9 ANXIETY: ICD-10-CM

## 2024-02-05 DIAGNOSIS — F33.9 RECURRENT DEPRESSION (HCC): ICD-10-CM

## 2024-02-05 DIAGNOSIS — I10 ESSENTIAL HYPERTENSION: Primary | ICD-10-CM

## 2024-02-05 DIAGNOSIS — E66.01 SEVERE OBESITY (BMI 35.0-39.9) WITH COMORBIDITY (HCC): ICD-10-CM

## 2024-02-05 PROCEDURE — 3079F DIAST BP 80-89 MM HG: CPT | Performed by: FAMILY MEDICINE

## 2024-02-05 PROCEDURE — 3077F SYST BP >= 140 MM HG: CPT | Performed by: FAMILY MEDICINE

## 2024-02-05 PROCEDURE — 99214 OFFICE O/P EST MOD 30 MIN: CPT | Performed by: FAMILY MEDICINE

## 2024-02-05 RX ORDER — TRIAMCINOLONE ACETONIDE 0.25 MG/G
OINTMENT TOPICAL
Qty: 30 G | Refills: 1 | Status: SHIPPED | OUTPATIENT
Start: 2024-02-05 | End: 2024-02-12

## 2024-02-05 RX ORDER — LOSARTAN POTASSIUM 50 MG/1
50 TABLET ORAL DAILY
Qty: 90 TABLET | Refills: 0 | Status: SHIPPED | OUTPATIENT
Start: 2024-02-05

## 2024-02-05 ASSESSMENT — PATIENT HEALTH QUESTIONNAIRE - PHQ9
SUM OF ALL RESPONSES TO PHQ9 QUESTIONS 1 & 2: 0
9. THOUGHTS THAT YOU WOULD BE BETTER OFF DEAD, OR OF HURTING YOURSELF: 0
SUM OF ALL RESPONSES TO PHQ QUESTIONS 1-9: 2
7. TROUBLE CONCENTRATING ON THINGS, SUCH AS READING THE NEWSPAPER OR WATCHING TELEVISION: 0
SUM OF ALL RESPONSES TO PHQ QUESTIONS 1-9: 2
SUM OF ALL RESPONSES TO PHQ QUESTIONS 1-9: 2
5. POOR APPETITE OR OVEREATING: 1
SUM OF ALL RESPONSES TO PHQ QUESTIONS 1-9: 2
3. TROUBLE FALLING OR STAYING ASLEEP: 0
10. IF YOU CHECKED OFF ANY PROBLEMS, HOW DIFFICULT HAVE THESE PROBLEMS MADE IT FOR YOU TO DO YOUR WORK, TAKE CARE OF THINGS AT HOME, OR GET ALONG WITH OTHER PEOPLE: 0
2. FEELING DOWN, DEPRESSED OR HOPELESS: 0
6. FEELING BAD ABOUT YOURSELF - OR THAT YOU ARE A FAILURE OR HAVE LET YOURSELF OR YOUR FAMILY DOWN: 0
8. MOVING OR SPEAKING SO SLOWLY THAT OTHER PEOPLE COULD HAVE NOTICED. OR THE OPPOSITE, BEING SO FIGETY OR RESTLESS THAT YOU HAVE BEEN MOVING AROUND A LOT MORE THAN USUAL: 0
1. LITTLE INTEREST OR PLEASURE IN DOING THINGS: 0
4. FEELING TIRED OR HAVING LITTLE ENERGY: 1

## 2024-02-05 NOTE — PROGRESS NOTES
\"Have you been to the ER, urgent care clinic since your last visit?  Hospitalized since your last visit?\"    NO    “Have you seen or consulted any other health care providers outside of [facility] since your last visit?”    NO         
   Unable to Pay for Housing in the Last Year: Not on file    Number of Places Lived in the Last Year: Not on file    Unstable Housing in the Last Year: Not on file       Family History   Problem Relation Age of Onset    Diabetes Mother     Hypertension Mother     Kidney Disease Mother         stones    Hypertension Father     Diabetes Sister     Hypertension Sister     Stroke Sister          OBJECTIVE    Physical Exam:     BP (!) 142/80 (Site: Left Upper Arm, Position: Sitting, Cuff Size: Large Adult)   Pulse 88   Temp 98 °F (36.7 °C) (Temporal)   Resp 18   Ht 1.651 m (5' 5\")   Wt 102.5 kg (226 lb)   SpO2 98%   BMI 37.61 kg/m²         General: alert, well-appearing, AA, in no apparent distress or pain  Neck: supple, no adenopathy palpated  HEENT: throat and pharynx reddish, no exudate, eac patent, tm intact  CVS: normal rate, regular rhythm, distinct S1 and S2  Lungs:clear to ausculation bilaterally, no crackles, wheezing or rhonchi noted  Abdomen: normoactive bowel sounds, soft, non-tender  Extremities: no edema, no cyanosis, MSK grossly normal  Skin+ hyperpigmented patch dorsum L foot  Psych:  mood and affect normal    CMP:   Lab Results   Component Value Date/Time     11/04/2023 09:38 AM    K 4.7 11/04/2023 09:38 AM     11/04/2023 09:38 AM    CO2 29 11/04/2023 09:38 AM    BUN 12 11/04/2023 09:38 AM    CREATININE 0.71 11/04/2023 09:38 AM    GLUCOSE 98 11/04/2023 09:38 AM    CALCIUM 8.8 11/04/2023 09:38 AM    PROT 7.3 11/04/2023 09:38 AM    LABALBU 3.6 11/04/2023 09:38 AM    BILITOT 0.3 11/04/2023 09:38 AM    AST 14 11/04/2023 09:38 AM    ALT 23 11/04/2023 09:38 AM        CBC:   Lab Results   Component Value Date/Time    WBC 7.9 11/04/2023 09:38 AM    RBC 5.49 11/04/2023 09:38 AM    HGB 14.5 11/04/2023 09:38 AM    HCT 46.8 11/04/2023 09:38 AM    MCV 85.2 11/04/2023 09:38 AM    MCH 26.4 11/04/2023 09:38 AM    MCHC 31.0 11/04/2023 09:38 AM    RDW 13.8 11/04/2023 09:38 AM     11/04/2023

## 2024-04-02 RX ORDER — TRIAMCINOLONE ACETONIDE 0.25 MG/G
OINTMENT TOPICAL
Qty: 30 G | Refills: 1 | Status: SHIPPED | OUTPATIENT
Start: 2024-04-02

## 2024-04-15 ENCOUNTER — OFFICE VISIT (OUTPATIENT)
Age: 44
End: 2024-04-15
Payer: COMMERCIAL

## 2024-04-15 VITALS
SYSTOLIC BLOOD PRESSURE: 138 MMHG | OXYGEN SATURATION: 98 % | DIASTOLIC BLOOD PRESSURE: 76 MMHG | TEMPERATURE: 98.1 F | RESPIRATION RATE: 16 BRPM | HEART RATE: 84 BPM | HEIGHT: 65 IN | BODY MASS INDEX: 37.32 KG/M2 | WEIGHT: 224 LBS

## 2024-04-15 DIAGNOSIS — E66.01 SEVERE OBESITY (BMI 35.0-39.9) WITH COMORBIDITY (HCC): ICD-10-CM

## 2024-04-15 DIAGNOSIS — Z72.0 TOBACCO USE: ICD-10-CM

## 2024-04-15 DIAGNOSIS — F33.0 MILD EPISODE OF RECURRENT MAJOR DEPRESSIVE DISORDER (HCC): ICD-10-CM

## 2024-04-15 DIAGNOSIS — F41.9 ANXIETY: ICD-10-CM

## 2024-04-15 DIAGNOSIS — M25.562 ACUTE PAIN OF LEFT KNEE: ICD-10-CM

## 2024-04-15 DIAGNOSIS — I10 ESSENTIAL HYPERTENSION: Primary | ICD-10-CM

## 2024-04-15 PROBLEM — N93.9 ABNORMAL UTERINE BLEEDING (AUB): Status: RESOLVED | Noted: 2018-04-17 | Resolved: 2024-04-15

## 2024-04-15 PROBLEM — N94.5 SECONDARY DYSMENORRHEA: Status: RESOLVED | Noted: 2018-04-17 | Resolved: 2024-04-15

## 2024-04-15 PROCEDURE — 3078F DIAST BP <80 MM HG: CPT | Performed by: FAMILY MEDICINE

## 2024-04-15 PROCEDURE — 3075F SYST BP GE 130 - 139MM HG: CPT | Performed by: FAMILY MEDICINE

## 2024-04-15 PROCEDURE — 99214 OFFICE O/P EST MOD 30 MIN: CPT | Performed by: FAMILY MEDICINE

## 2024-04-15 RX ORDER — NAPROXEN 500 MG/1
500 TABLET ORAL 2 TIMES DAILY WITH MEALS
Qty: 60 TABLET | Refills: 0 | Status: SHIPPED | OUTPATIENT
Start: 2024-04-15

## 2024-04-15 NOTE — PROGRESS NOTES
\"Have you been to the ER, urgent care clinic since your last visit?  Hospitalized since your last visit?\"    NO    “Have you seen or consulted any other health care providers outside of [facility] since your last visit?”    NO            Click Here for Release of Records Request  
205 11/04/2023 09:38 AM    MPV 12.5 11/04/2023 09:38 AM        Lipids   Lab Results   Component Value Date/Time    CHOL 122 11/04/2023 09:38 AM    TRIG 50 11/04/2023 09:38 AM    HDL 46 11/04/2023 09:38 AM    LDLCALC 66 11/04/2023 09:38 AM    CHOLHDLRATIO 2.7 11/04/2023 09:38 AM         Imaging results last 24 hrs :No results found.    Imaging results impression onlyNo results found.   XR KNEE LEFT (1-2 VIEWS)    (Results Pending)       A1c:   Hemoglobin A1C   Date Value Ref Range Status   11/04/2023 5.5 4.2 - 5.6 % Final     Comment:     (NOTE)  HbA1C Interpretive Ranges  <5.7              Normal  5.7 - 6.4         Consider Prediabetes  >6.5              Consider Diabetes     05/03/2022 5.6 4.8 - 5.6 % Final     Comment:              Prediabetes: 5.7 - 6.4           Diabetes: >6.4           Glycemic control for adults with diabetes: <7.0     11/03/2020 5.6 4.8 - 5.6 % Final     Comment:              Prediabetes: 5.7 - 6.4           Diabetes: >6.4           Glycemic control for adults with diabetes: <7.0         ASSESSMENT/PLAN  Diagnoses and all orders for this visit:      Essential hypertension  controlled  Cont norvasc 10 mg and  losartan 50 mg  discussed smoking cessation  -     Basic Metabolic Panel; Future    Mild episode of recurrent depression (HCC)  Improved, in remission  No longer on celexa, monitoring    Anxiety  See above    Tobacco use  Advised cessation    Severe obesity (BMI 35.0-39.9) with comorbidity (HCC)  Discussed weight loss strategies    Acute pain of left knee  Bursitis, r/o fx  -     XR KNEE LEFT (1-2 VIEWS); Future  Start:  -     naproxen (NAPROSYN) 500 MG tablet; Take 1 tablet by mouth 2 times daily (with meals)  Offered ortho consult,wants to hold off        Ff-up in 12 weeks or sooner prn    Patient understands plan of care. Patient has provided input and agrees with goals.

## 2024-05-23 RX ORDER — NAPROXEN 500 MG/1
500 TABLET ORAL 2 TIMES DAILY WITH MEALS
Qty: 60 TABLET | Refills: 0 | Status: SHIPPED | OUTPATIENT
Start: 2024-05-23

## 2024-05-23 NOTE — TELEPHONE ENCOUNTER
Last OV 04/15/2024  Next OV 07/16/2024  Last lab 11/04/2023  Last filled 04/15/2024  60 tablets  0 refills

## 2024-06-20 RX ORDER — AMLODIPINE BESYLATE 10 MG/1
10 TABLET ORAL DAILY
Qty: 90 TABLET | Refills: 0 | Status: SHIPPED | OUTPATIENT
Start: 2024-06-20

## 2024-08-16 ENCOUNTER — OFFICE VISIT (OUTPATIENT)
Facility: CLINIC | Age: 44
End: 2024-08-16
Payer: COMMERCIAL

## 2024-08-16 VITALS
DIASTOLIC BLOOD PRESSURE: 96 MMHG | SYSTOLIC BLOOD PRESSURE: 138 MMHG | HEART RATE: 58 BPM | BODY MASS INDEX: 35.89 KG/M2 | TEMPERATURE: 98.8 F | RESPIRATION RATE: 16 BRPM | OXYGEN SATURATION: 100 % | WEIGHT: 215.4 LBS | HEIGHT: 65 IN

## 2024-08-16 DIAGNOSIS — E66.01 SEVERE OBESITY (BMI 35.0-39.9) WITH COMORBIDITY (HCC): ICD-10-CM

## 2024-08-16 DIAGNOSIS — I10 ESSENTIAL HYPERTENSION: Primary | ICD-10-CM

## 2024-08-16 DIAGNOSIS — Z72.0 TOBACCO USE: ICD-10-CM

## 2024-08-16 PROCEDURE — 3080F DIAST BP >= 90 MM HG: CPT | Performed by: FAMILY MEDICINE

## 2024-08-16 PROCEDURE — 99214 OFFICE O/P EST MOD 30 MIN: CPT | Performed by: FAMILY MEDICINE

## 2024-08-16 PROCEDURE — 3075F SYST BP GE 130 - 139MM HG: CPT | Performed by: FAMILY MEDICINE

## 2024-08-16 RX ORDER — LOSARTAN POTASSIUM 50 MG/1
50 TABLET ORAL DAILY
Qty: 90 TABLET | Refills: 0 | Status: SHIPPED | OUTPATIENT
Start: 2024-08-16

## 2024-08-16 RX ORDER — LOSARTAN POTASSIUM 100 MG/1
100 TABLET ORAL DAILY
Qty: 90 TABLET | Refills: 0 | Status: CANCELLED | OUTPATIENT
Start: 2024-08-16

## 2024-08-16 NOTE — PROGRESS NOTES
\"Have you been to the ER, urgent care clinic since your last visit?  Hospitalized since your last visit?\"    YES - When: approximately 1  weeks ago.  Where and Why: [facility] .    “Have you seen or consulted any other health care providers outside of [facility] since your last visit?”    YES - When: approximately 1  weeks ago.  Where and Why: [facility], [facility].    Have you had a mammogram?”   NO    Date of last Mammogram: 11/23/2020             Click Here for Release of Records Request  
pain        Social History     Socioeconomic History    Marital status: SINGLE     Spouse name: Not on file    Number of children: Not on file    Years of education: Not on file    Highest education level: Not on file   Occupational History    Not on file   Tobacco Use    Smoking status: Current Every Day Smoker     Packs/day: 0.08     Years: 10.00     Pack years: 0.80    Smokeless tobacco: Never Used    Tobacco comment: smokes cigars - 1.5 a day   Substance and Sexual Activity    Alcohol use: Yes     Comment: occasionally    Drug use: No    Sexual activity: Not on file   Other Topics Concern    Not on file   Social History Narrative    Not on file     Social Determinants of Health     Financial Resource Strain:     Difficulty of Paying Living Expenses: Not on file   Food Insecurity:     Worried About Running Out of Food in the Last Year: Not on file    Ran Out of Food in the Last Year: Not on file   Transportation Needs:     Lack of Transportation (Medical): Not on file    Lack of Transportation (Non-Medical): Not on file   Physical Activity:     Days of Exercise per Week: Not on file    Minutes of Exercise per Session: Not on file   Stress:     Feeling of Stress : Not on file   Social Connections:     Frequency of Communication with Friends and Family: Not on file    Frequency of Social Gatherings with Friends and Family: Not on file    Attends Hoahaoism Services: Not on file    Active Member of Clubs or Organizations: Not on file    Attends Club or Organization Meetings: Not on file    Marital Status: Not on file   Intimate Partner Violence:     Fear of Current or Ex-Partner: Not on file    Emotionally Abused: Not on file    Physically Abused: Not on file    Sexually Abused: Not on file   Housing Stability:     Unable to Pay for Housing in the Last Year: Not on file    Number of Places Lived in the Last Year: Not on file    Unstable Housing in the Last Year: Not on file       Family History   Problem Relation Age

## 2024-11-25 ENCOUNTER — HOSPITAL ENCOUNTER (EMERGENCY)
Facility: HOSPITAL | Age: 44
Discharge: HOME | End: 2024-11-25
Attending: EMERGENCY MEDICINE
Payer: COMMERCIAL

## 2024-11-25 ENCOUNTER — TELEPHONE (OUTPATIENT)
Facility: CLINIC | Age: 44
End: 2024-11-25

## 2024-11-25 VITALS
OXYGEN SATURATION: 100 % | HEIGHT: 65 IN | RESPIRATION RATE: 17 BRPM | SYSTOLIC BLOOD PRESSURE: 168 MMHG | TEMPERATURE: 98.3 F | BODY MASS INDEX: 35.99 KG/M2 | DIASTOLIC BLOOD PRESSURE: 95 MMHG | WEIGHT: 216 LBS | HEART RATE: 57 BPM

## 2024-11-25 DIAGNOSIS — I10 ELEVATED BLOOD PRESSURE READING WITH DIAGNOSIS OF HYPERTENSION: Primary | ICD-10-CM

## 2024-11-25 DIAGNOSIS — R51.9 ACUTE NONINTRACTABLE HEADACHE, UNSPECIFIED HEADACHE TYPE: ICD-10-CM

## 2024-11-25 LAB — GLUCOSE BLD STRIP.AUTO-MCNC: 85 MG/DL (ref 70–110)

## 2024-11-25 PROCEDURE — 82962 GLUCOSE BLOOD TEST: CPT

## 2024-11-25 PROCEDURE — 6370000000 HC RX 637 (ALT 250 FOR IP): Performed by: NURSE PRACTITIONER

## 2024-11-25 PROCEDURE — 99283 EMERGENCY DEPT VISIT LOW MDM: CPT

## 2024-11-25 RX ORDER — AMLODIPINE BESYLATE 10 MG/1
10 TABLET ORAL
Status: COMPLETED | OUTPATIENT
Start: 2024-11-25 | End: 2024-11-25

## 2024-11-25 RX ORDER — AMLODIPINE BESYLATE 10 MG/1
10 TABLET ORAL DAILY
Qty: 90 TABLET | Refills: 0 | Status: CANCELLED | OUTPATIENT
Start: 2024-11-25

## 2024-11-25 RX ORDER — LOSARTAN POTASSIUM 50 MG/1
50 TABLET ORAL DAILY
Status: DISCONTINUED | OUTPATIENT
Start: 2024-11-25 | End: 2024-11-25 | Stop reason: HOSPADM

## 2024-11-25 RX ORDER — LOSARTAN POTASSIUM 50 MG/1
50 TABLET ORAL DAILY
Qty: 90 TABLET | Refills: 0 | Status: CANCELLED | OUTPATIENT
Start: 2024-11-25

## 2024-11-25 RX ADMIN — LOSARTAN POTASSIUM 50 MG: 50 TABLET, FILM COATED ORAL at 13:56

## 2024-11-25 RX ADMIN — AMLODIPINE BESYLATE 10 MG: 10 TABLET ORAL at 13:56

## 2024-11-25 ASSESSMENT — LIFESTYLE VARIABLES
HOW MANY STANDARD DRINKS CONTAINING ALCOHOL DO YOU HAVE ON A TYPICAL DAY: PATIENT DOES NOT DRINK
HOW OFTEN DO YOU HAVE A DRINK CONTAINING ALCOHOL: NEVER

## 2024-11-25 ASSESSMENT — ENCOUNTER SYMPTOMS
PHOTOPHOBIA: 0
RESPIRATORY NEGATIVE: 1
NAUSEA: 1

## 2024-11-25 ASSESSMENT — PAIN - FUNCTIONAL ASSESSMENT
PAIN_FUNCTIONAL_ASSESSMENT: 0-10
PAIN_FUNCTIONAL_ASSESSMENT: ACTIVITIES ARE NOT PREVENTED

## 2024-11-25 ASSESSMENT — PAIN DESCRIPTION - DESCRIPTORS: DESCRIPTORS: ACHING

## 2024-11-25 ASSESSMENT — PAIN DESCRIPTION - LOCATION: LOCATION: HEAD

## 2024-11-25 ASSESSMENT — PAIN SCALES - GENERAL: PAINLEVEL_OUTOF10: 9

## 2024-11-25 ASSESSMENT — PAIN DESCRIPTION - PAIN TYPE: TYPE: ACUTE PAIN

## 2024-11-25 NOTE — TELEPHONE ENCOUNTER
Patient called office stating that her /137,196/116  Having a bad headache, blurred vision and feels like she has gas in her chest.   Patient states she has not taken her BP medication for several days.   Patient has been advised to go to the ER for emergent care for her HTN.  Patient verbalizes understanding.

## 2024-11-25 NOTE — TELEPHONE ENCOUNTER
Last OV 08/16/2024  Next OV no f/u scheduled.   Advise the patient to call the office to schedule a ER f/u

## 2024-11-25 NOTE — ED PROVIDER NOTES
Neurological:      Mental Status: She is alert and oriented to person, place, and time.      GCS: GCS eye subscore is 4. GCS verbal subscore is 5. GCS motor subscore is 6.      Gait: Gait is intact.           SCREENINGS                 Diagnostic Study Results     Labs -  Recent Results (from the past 12 hour(s))   POCT Glucose    Collection Time: 11/25/24  1:28 PM   Result Value Ref Range    POC Glucose 85 70 - 110 mg/dL       EKG: All EKG's are interpreted by the Emergency Department Physician who either signs or Co-signs this chart in the absence of a cardiologist.    RADIOLOGY:   Non-plain film images such as CT, Ultrasound and MRI are read by the radiologist. Plain radiographic images are visualized and preliminarily interpreted by the emergency physician with the below findings:    Radiologic Studies -   No orders to display       The laboratory results, imaging results, and other diagnostic exams were reviewed in the EMR.    Medical Decision Making   1:54 PM KIMBERLI SLAUGHTER (Eli Guzman NP) am the first provider for this patient. Initial assessment performed. I reviewed the vital signs, available nursing notes, past medical history, past surgical history, family history and social history. The patients presenting problems have been discussed, and they are in agreement with the care plan formulated and outlined with them.  I have encouraged them to ask questions as they arise throughout their visit.    Vital Signs-Reviewed the patient's vital signs.    Records Reviewed: Nursing Notes and Old Medical Records Personally, on initial evaluation (Time of Review: 2:03 PM)      ED Course: Progress Notes, Reevaluation, and Consults:  44-year-old female presents to the emergency department with reports of her blood pressure being elevated.  States Friday she has been dealing with a lot of life stressors. Reports the past 4 days she has not been taking her blood pressure medication as she ran out.        Patient is alert

## 2024-11-25 NOTE — ED TRIAGE NOTES
Ambulatory pt c/o headache since Friday. Pt reports losing BP medication and not being able to take it x 4 days. Sent over by PCP

## 2024-12-19 RX ORDER — AMLODIPINE BESYLATE 10 MG/1
10 TABLET ORAL DAILY
Qty: 90 TABLET | Refills: 0 | Status: SHIPPED | OUTPATIENT
Start: 2024-12-19

## 2025-01-28 ENCOUNTER — OFFICE VISIT (OUTPATIENT)
Facility: CLINIC | Age: 45
End: 2025-01-28
Payer: COMMERCIAL

## 2025-01-28 VITALS
BODY MASS INDEX: 36.92 KG/M2 | RESPIRATION RATE: 16 BRPM | DIASTOLIC BLOOD PRESSURE: 92 MMHG | WEIGHT: 221.6 LBS | OXYGEN SATURATION: 99 % | HEART RATE: 65 BPM | HEIGHT: 65 IN | SYSTOLIC BLOOD PRESSURE: 146 MMHG | TEMPERATURE: 98.2 F

## 2025-01-28 DIAGNOSIS — Z13.1 SCREENING FOR DIABETES MELLITUS (DM): ICD-10-CM

## 2025-01-28 DIAGNOSIS — I10 ESSENTIAL HYPERTENSION: Primary | ICD-10-CM

## 2025-01-28 DIAGNOSIS — I10 ESSENTIAL HYPERTENSION: ICD-10-CM

## 2025-01-28 DIAGNOSIS — E66.01 SEVERE OBESITY (BMI 35.0-39.9) WITH COMORBIDITY: ICD-10-CM

## 2025-01-28 DIAGNOSIS — Z91.199 NON-COMPLIANCE: ICD-10-CM

## 2025-01-28 DIAGNOSIS — Z72.0 TOBACCO USE: ICD-10-CM

## 2025-01-28 PROBLEM — F33.0 MILD EPISODE OF RECURRENT MAJOR DEPRESSIVE DISORDER (HCC): Status: RESOLVED | Noted: 2018-05-08 | Resolved: 2025-01-28

## 2025-01-28 PROBLEM — G47.9 SLEEPING DIFFICULTY: Status: RESOLVED | Noted: 2019-03-15 | Resolved: 2025-01-28

## 2025-01-28 PROCEDURE — 99214 OFFICE O/P EST MOD 30 MIN: CPT | Performed by: FAMILY MEDICINE

## 2025-01-28 PROCEDURE — 3080F DIAST BP >= 90 MM HG: CPT | Performed by: FAMILY MEDICINE

## 2025-01-28 PROCEDURE — 3077F SYST BP >= 140 MM HG: CPT | Performed by: FAMILY MEDICINE

## 2025-01-28 RX ORDER — HYDROCHLOROTHIAZIDE 25 MG/1
25 TABLET ORAL EVERY MORNING
Qty: 90 TABLET | Refills: 0 | Status: CANCELLED | OUTPATIENT
Start: 2025-01-28

## 2025-01-28 RX ORDER — TRIAMCINOLONE ACETONIDE 0.25 MG/G
OINTMENT TOPICAL
Qty: 30 G | Refills: 1 | Status: SHIPPED | OUTPATIENT
Start: 2025-01-28

## 2025-01-28 RX ORDER — METRONIDAZOLE 500 MG/1
TABLET ORAL
COMMUNITY
Start: 2025-01-20

## 2025-01-28 RX ORDER — AMLODIPINE BESYLATE 10 MG/1
10 TABLET ORAL DAILY
Qty: 90 TABLET | Refills: 3 | Status: SHIPPED | OUTPATIENT
Start: 2025-01-28

## 2025-01-28 RX ORDER — LOSARTAN POTASSIUM 50 MG/1
50 TABLET ORAL DAILY
Qty: 90 TABLET | Refills: 0 | Status: SHIPPED | OUTPATIENT
Start: 2025-01-28

## 2025-01-28 SDOH — ECONOMIC STABILITY: FOOD INSECURITY: WITHIN THE PAST 12 MONTHS, THE FOOD YOU BOUGHT JUST DIDN'T LAST AND YOU DIDN'T HAVE MONEY TO GET MORE.: SOMETIMES TRUE

## 2025-01-28 SDOH — ECONOMIC STABILITY: FOOD INSECURITY: WITHIN THE PAST 12 MONTHS, YOU WORRIED THAT YOUR FOOD WOULD RUN OUT BEFORE YOU GOT MONEY TO BUY MORE.: SOMETIMES TRUE

## 2025-01-28 ASSESSMENT — PATIENT HEALTH QUESTIONNAIRE - PHQ9
10. IF YOU CHECKED OFF ANY PROBLEMS, HOW DIFFICULT HAVE THESE PROBLEMS MADE IT FOR YOU TO DO YOUR WORK, TAKE CARE OF THINGS AT HOME, OR GET ALONG WITH OTHER PEOPLE: NOT DIFFICULT AT ALL
SUM OF ALL RESPONSES TO PHQ QUESTIONS 1-9: 0
SUM OF ALL RESPONSES TO PHQ9 QUESTIONS 1 & 2: 0
6. FEELING BAD ABOUT YOURSELF - OR THAT YOU ARE A FAILURE OR HAVE LET YOURSELF OR YOUR FAMILY DOWN: NOT AT ALL
9. THOUGHTS THAT YOU WOULD BE BETTER OFF DEAD, OR OF HURTING YOURSELF: NOT AT ALL
4. FEELING TIRED OR HAVING LITTLE ENERGY: NOT AT ALL
8. MOVING OR SPEAKING SO SLOWLY THAT OTHER PEOPLE COULD HAVE NOTICED. OR THE OPPOSITE, BEING SO FIGETY OR RESTLESS THAT YOU HAVE BEEN MOVING AROUND A LOT MORE THAN USUAL: NOT AT ALL
SUM OF ALL RESPONSES TO PHQ QUESTIONS 1-9: 0
1. LITTLE INTEREST OR PLEASURE IN DOING THINGS: NOT AT ALL
SUM OF ALL RESPONSES TO PHQ QUESTIONS 1-9: 0
7. TROUBLE CONCENTRATING ON THINGS, SUCH AS READING THE NEWSPAPER OR WATCHING TELEVISION: NOT AT ALL
2. FEELING DOWN, DEPRESSED OR HOPELESS: NOT AT ALL
3. TROUBLE FALLING OR STAYING ASLEEP: NOT AT ALL
5. POOR APPETITE OR OVEREATING: NOT AT ALL
SUM OF ALL RESPONSES TO PHQ QUESTIONS 1-9: 0

## 2025-01-28 NOTE — PROGRESS NOTES
\"Have you been to the ER, urgent care clinic since your last visit?  Hospitalized since your last visit?\"    YES - When: approximately 2 months ago.  Where and Why: elevated bp hbved .    “Have you seen or consulted any other health care providers outside our system since your last visit?”    YES - When: approximately 3  weeks ago.  Where and Why: obgyn for vaginitis .    Have you had a mammogram?”   NO    Date of last Mammogram: 11/23/2020

## 2025-01-28 NOTE — PATIENT INSTRUCTIONS
[facility] Utility - Financial Resources*  (Call [employer]/211 if need more resources.)    Utilities  CommonHelp  What they offer: Partnership with the [facility] of . Assist with finding and applying for government funded programs and benefits. You can also update your benefits or report changes through [employer].  Website: https://www.[employer] Techvir[place]New.net/  Phone Number: 833-5CALLVA (395-367-6922)    [employer]  What they offer: EnergyShare is [facility]'s energy assistance program of last resort for  anyone who faces financial hardships from unemployment or family crisis.  Phone Number: 187.365.5652  Address: [address] [place] [address]  Website: https://www.[employer]/[place]/billing/billing-options/energyshare    Energy Assistance Programs (EAP) - Department of   What they offer: EAP assists low income households in meeting their immediate home energy needs.  Phone Number: 614.758.2483 or contact your local department of   Website: https://www.dss.[place].gov/benefit/ea/    [facility]  What they offer: Bill and utility assistance  Phone number: 424.811.7867.  Website: https://Employma    [employer]  What they offer: Utility, rent, mortgage assistance   Phone number: 947.426.4748.  Website: https://BiancaMed.org  Regional Housing Crisis Hotline  Can assist with rent assistance, eviction prevention and utility bills.  Phone Number: 308.985.9485    Medical  [employer] Financial Assistance  What they offer: The [employer] Financial Assistance Program helps uninsured patients who do not qualify for government-sponsored health insurance and cannot afford to pay for their medical care. Insured patient may also qualify for assistance based on family income, family size, and medical needs.  Phone Number: [phone]  How to apply for the [employer] Financial Assistance

## 2025-01-28 NOTE — PROGRESS NOTES
Elio Jarrett, 44 y.o.,  female    SUBJECTIVE  Ff-up     HTN-taking norvasc, say ran out of losartan, lost to ff-up.  She continues to smoke. Denies depression symptoms     Routine gyne- dr. Noel calvin [facility]    ROS:  See HPI, all others negative        Current Outpatient Medications:     metroNIDAZOLE (FLAGYL) 500 MG tablet, , Disp: , Rfl:     triamcinolone (KENALOG) 0.025 % ointment, APPLY TO AFFECTED AREA TWICE A DAY, Disp: 30 g, Rfl: 1    losartan (COZAAR) 50 MG tablet, Take 1 tablet by mouth daily, Disp: 90 tablet, Rfl: 0    amLODIPine (NORVASC) 10 MG tablet, Take 1 tablet by mouth daily, Disp: 90 tablet, Rfl: 3      Allergies   Allergen Reactions    Carrot Swelling     Mouth and gum swelling      Pcn [Penicillins] Other (comments)     Patient states not allergic but gets candidiasis from it.     Strawberry Hives and Swelling       Past Medical History:   Diagnosis Date    Anxiety and depression     Bruises easily     Cervical high risk human papillomavirus (HPV) DNA test positive 03/2017    rpt pap smear 3/2018    Chronic constipation     dr. quirino moctezuma    Contact dermatitis and other eczema, due to unspecified cause     Depression 12/2/2015    Essential hypertension 12/30/2015    GERD (gastroesophageal reflux disease) 9/13/2013    Gynecologic exam normal     [place]    Headache(784.0)     Heavy menses     Hx of migraines     Irregular menses     Ovarian cyst     Pelvic pain        Social History     Socioeconomic History    Marital status: SINGLE     Spouse name: Not on file    Number of children: Not on file    Years of education: Not on file    Highest education level: Not on file   Occupational History    Not on file   Tobacco Use    Smoking status: Current Every Day Smoker     Packs/day: 0.08     Years: 10.00     Pack years: 0.80    Smokeless tobacco: Never Used    Tobacco comment: smokes cigars - 1.5 a day   Substance and Sexual Activity    Alcohol use: Yes     Comment: occasionally    Drug

## 2025-03-19 ENCOUNTER — HOSPITAL ENCOUNTER (OUTPATIENT)
Facility: HOSPITAL | Age: 45
Setting detail: SPECIMEN
Discharge: HOME | End: 2025-03-22

## 2025-03-19 LAB — LABCORP SPECIMEN COLLECTION: NORMAL

## 2025-03-19 PROCEDURE — 99001 SPECIMEN HANDLING PT-LAB: CPT

## 2025-03-20 ENCOUNTER — RESULTS FOLLOW-UP (OUTPATIENT)
Facility: CLINIC | Age: 45
End: 2025-03-20

## 2025-03-20 LAB
ALBUMIN SERPL-MCNC: 4.3 G/DL (ref 3.9–4.9)
ALP SERPL-CCNC: 101 IU/L (ref 44–121)
ALT SERPL-CCNC: 20 IU/L (ref 0–32)
AST SERPL-CCNC: 17 IU/L (ref 0–40)
BILIRUB SERPL-MCNC: 0.2 MG/DL (ref 0–1.2)
BUN SERPL-MCNC: 16 MG/DL (ref 6–24)
BUN/CREAT SERPL: 24 (ref 9–23)
CALCIUM SERPL-MCNC: 9.5 MG/DL (ref 8.7–10.2)
CHLORIDE SERPL-SCNC: 104 MMOL/L (ref 96–106)
CHOLEST SERPL-MCNC: 119 MG/DL (ref 100–199)
CO2 SERPL-SCNC: 26 MMOL/L (ref 20–29)
CREAT SERPL-MCNC: 0.67 MG/DL (ref 0.57–1)
EGFRCR SERPLBLD CKD-EPI 2021: 110 ML/MIN/1.73
GLOBULIN SER CALC-MCNC: 3.1 G/DL (ref 1.5–4.5)
GLUCOSE SERPL-MCNC: 83 MG/DL (ref 70–99)
HBA1C MFR BLD: 5.7 % (ref 4.8–5.6)
HDLC SERPL-MCNC: 51 MG/DL
LDLC SERPL CALC-MCNC: 56 MG/DL (ref 0–99)
POTASSIUM SERPL-SCNC: 5 MMOL/L (ref 3.5–5.2)
PROT SERPL-MCNC: 7.4 G/DL (ref 6–8.5)
SODIUM SERPL-SCNC: 141 MMOL/L (ref 134–144)
TRIGL SERPL-MCNC: 50 MG/DL (ref 0–149)
VLDLC SERPL CALC-MCNC: 12 MG/DL (ref 5–40)

## 2025-03-26 ENCOUNTER — HOSPITAL ENCOUNTER (OUTPATIENT)
Facility: HOSPITAL | Age: 45
Discharge: HOME | End: 2025-03-29
Payer: COMMERCIAL

## 2025-03-26 ENCOUNTER — OFFICE VISIT (OUTPATIENT)
Facility: CLINIC | Age: 45
End: 2025-03-26
Payer: COMMERCIAL

## 2025-03-26 VITALS
DIASTOLIC BLOOD PRESSURE: 80 MMHG | WEIGHT: 224 LBS | RESPIRATION RATE: 16 BRPM | BODY MASS INDEX: 37.32 KG/M2 | SYSTOLIC BLOOD PRESSURE: 134 MMHG | HEIGHT: 65 IN

## 2025-03-26 DIAGNOSIS — G89.29 CHRONIC RIGHT HIP PAIN: ICD-10-CM

## 2025-03-26 DIAGNOSIS — G44.209 TENSION HEADACHE: ICD-10-CM

## 2025-03-26 DIAGNOSIS — M25.551 CHRONIC RIGHT HIP PAIN: ICD-10-CM

## 2025-03-26 DIAGNOSIS — I10 ESSENTIAL HYPERTENSION: Primary | ICD-10-CM

## 2025-03-26 DIAGNOSIS — Z72.0 TOBACCO USE: ICD-10-CM

## 2025-03-26 DIAGNOSIS — E66.01 SEVERE OBESITY (BMI 35.0-39.9) WITH COMORBIDITY: ICD-10-CM

## 2025-03-26 DIAGNOSIS — G43.109 MIGRAINE WITH AURA AND WITHOUT STATUS MIGRAINOSUS, NOT INTRACTABLE: ICD-10-CM

## 2025-03-26 PROCEDURE — 3079F DIAST BP 80-89 MM HG: CPT | Performed by: FAMILY MEDICINE

## 2025-03-26 PROCEDURE — 73502 X-RAY EXAM HIP UNI 2-3 VIEWS: CPT

## 2025-03-26 PROCEDURE — 3075F SYST BP GE 130 - 139MM HG: CPT | Performed by: FAMILY MEDICINE

## 2025-03-26 PROCEDURE — 99214 OFFICE O/P EST MOD 30 MIN: CPT | Performed by: FAMILY MEDICINE

## 2025-03-26 RX ORDER — BUTALBITAL, ACETAMINOPHEN AND CAFFEINE 50; 325; 40 MG/1; MG/1; MG/1
1 TABLET ORAL EVERY 4 HOURS PRN
Qty: 30 TABLET | Refills: 0 | Status: SHIPPED | OUTPATIENT
Start: 2025-03-26

## 2025-03-26 RX ORDER — MELOXICAM 15 MG/1
15 TABLET ORAL DAILY
Qty: 30 TABLET | Refills: 0 | Status: SHIPPED | OUTPATIENT
Start: 2025-03-26

## 2025-03-26 RX ORDER — DULOXETIN HYDROCHLORIDE 30 MG/1
30 CAPSULE, DELAYED RELEASE ORAL DAILY
Qty: 30 CAPSULE | Refills: 3 | Status: SHIPPED | OUTPATIENT
Start: 2025-03-26

## 2025-03-26 NOTE — PROGRESS NOTES
\"Have you been to the ER, urgent care clinic since your last visit?  Hospitalized since your last visit?\"    NO    “Have you seen or consulted any other health care providers outside our system since your last visit?”    NO    Have you had a mammogram?”   NO    Date of last Mammogram: 11/23/2020

## 2025-03-26 NOTE — PROGRESS NOTES
Elio Jarrett (:  1980) is a 44 y.o. female, Established patient, here for evaluation of the following chief complaint(s):  Hypertension, Headache (Since last week. Was worst Thursday and Friday. Eases and gets worse. Today back to square one ), and Hip Pain (Right hip )         Assessment & Plan  1. Hypertension.  - Blood pressure has improved to 134/80 from a previous reading of 146/92.  - Current regimen of amlodipine 10 mg and losartan 50 mg will be continued.  - Kidney function test, liver function tests, and cholesterol levels are within normal limits.  - Total cholesterol is 119, LDL cholesterol is 56, no indication for statin therapy.    2. Prediabetes.  - Average blood glucose levels over the past three months indicate a borderline prediabetic state.  - Advised to maintain a healthy diet rich in low-carbohydrate foods, fresh fruits, vegetables, nuts, beans, lean meats, and fatty fish.  - Regular exercise and avoidance of processed foods are recommended.  - Counseling provided on dietary changes and healthy lifestyle practices.    3. Headaches.  - Recent headache episode different from previous migraines, accompanied by nausea.  - Fioricet prescribed for use during headache episodes, caution advised against frequent use due to potential rebound headaches.  - Discussion on the option of initiatin Cymbalta for mood regulation and headache management, she declines for now  - Follow-up scheduled next month to assess headache management progress.    4. Right hip pain.  - Chronic pain with recent tenderness potentially indicative of bursitis or inflammation.  - Referral to an orthopedic specialist will be made.  - Mobic prescribed for daily use with meals until orthopedic consultation.  - X-ray of the hip will be ordered to further evaluate the condition.      Follow-up  - Follow-up scheduled next month to assess the progress of headache management and hip pain evaluation.    Results  Labs   -

## 2025-03-27 ENCOUNTER — RESULTS FOLLOW-UP (OUTPATIENT)
Facility: CLINIC | Age: 45
End: 2025-03-27

## 2025-04-14 ENCOUNTER — OFFICE VISIT (OUTPATIENT)
Age: 45
End: 2025-04-14
Payer: COMMERCIAL

## 2025-04-14 VITALS — HEIGHT: 65 IN | WEIGHT: 229 LBS | BODY MASS INDEX: 38.15 KG/M2

## 2025-04-14 DIAGNOSIS — M70.61 TROCHANTERIC BURSITIS OF RIGHT HIP: Primary | ICD-10-CM

## 2025-04-14 PROCEDURE — 20610 DRAIN/INJ JOINT/BURSA W/O US: CPT

## 2025-04-14 PROCEDURE — 99204 OFFICE O/P NEW MOD 45 MIN: CPT

## 2025-04-14 RX ORDER — TRIAMCINOLONE ACETONIDE 40 MG/ML
80 INJECTION, SUSPENSION INTRA-ARTICULAR; INTRAMUSCULAR ONCE
Status: COMPLETED | OUTPATIENT
Start: 2025-04-14 | End: 2025-04-14

## 2025-04-14 RX ORDER — LIDOCAINE 50 MG/G
1 PATCH TOPICAL DAILY
Qty: 10 PATCH | Refills: 0 | Status: SHIPPED | OUTPATIENT
Start: 2025-04-14 | End: 2025-04-24

## 2025-04-14 RX ORDER — LIDOCAINE HYDROCHLORIDE 10 MG/ML
2 INJECTION, SOLUTION INFILTRATION; PERINEURAL ONCE
Status: COMPLETED | OUTPATIENT
Start: 2025-04-14 | End: 2025-04-14

## 2025-04-14 RX ADMIN — LIDOCAINE HYDROCHLORIDE 2 ML: 10 INJECTION, SOLUTION INFILTRATION; PERINEURAL at 09:06

## 2025-04-14 RX ADMIN — TRIAMCINOLONE ACETONIDE 80 MG: 40 INJECTION, SUSPENSION INTRA-ARTICULAR; INTRAMUSCULAR at 09:07

## 2025-04-14 NOTE — PATIENT INSTRUCTIONS
Patient Education        Trochanteric Bursitis: Exercises  Introduction  Here are some examples of exercises for you to try. The exercises may be suggested for a condition or for rehabilitation. Start each exercise slowly. Ease off the exercises if you start to have pain.  You will be told when to start these exercises and which ones will work best for you.  How to do the exercises  Hamstring stretch in a doorway    Sit on the floor close to a doorway. Be sure to stretch your affected leg first.  Lie down with your other leg through the doorway.  Slide your affected leg up the wall to straighten your knee. Don't point your toes. You should feel a gentle stretch down the back of your leg. Be sure to:  Hold the stretch for at least 1 minute. Then over time, try to lengthen the time you hold the stretch to as long as 6 minutes.  Repeat 2 to 4 times.  It's a good idea to repeat these steps with your other leg.  To stretch your right leg, scoot to the right side of the doorway.  To stretch your left leg, scoot to the left side of the doorway.  Keep both knees straight.  Keep your back flat and your other heel on the floor.  If you do not have a place to do this exercise in a doorway, there is another way to do it:  Lie on your back, and bend the knee of your affected leg.  Loop a towel under the ball and toes of that foot, and hold the ends of the towel in your hands.  Straighten your knee as you raise that foot into the air. Slowly pull back on the towel. You should feel a gentle stretch down the back of your leg.  Hold the stretch for 15 to 30 seconds. Or even better, hold the stretch for 1 minute if you can.  Repeat 2 to 4 times.  It's a good idea to repeat these steps with your other leg.  Hip abduction (lying on side)    Lie on your side, with your affected leg on top. You can use your hand or a pillow to support your head.  Keep your knee straight and your leg in a straight line with your body.  Lift your affected

## 2025-04-14 NOTE — ASSESSMENT & PLAN NOTE
After explaining potential risk of infection, skin discoloration, hyperglycemia, or flushing, I obtained written consent the patient would like to move forward with a right hip greater trochanteric bursa injection the patient was prepped in normal sterile fashion with freeze spray and alcohol.  80mg kenalog and 2mL 2% lidocaine was injected .  The needle was withdrawn, the area was cleansed and a sterile bandage was applied.  The patient tolerated the procedure well.

## 2025-04-23 ENCOUNTER — TRANSCRIBE ORDERS (OUTPATIENT)
Facility: HOSPITAL | Age: 45
End: 2025-04-23

## 2025-04-23 ENCOUNTER — OFFICE VISIT (OUTPATIENT)
Facility: CLINIC | Age: 45
End: 2025-04-23
Payer: COMMERCIAL

## 2025-04-23 VITALS
WEIGHT: 226 LBS | HEART RATE: 71 BPM | RESPIRATION RATE: 16 BRPM | HEIGHT: 65 IN | BODY MASS INDEX: 37.65 KG/M2 | OXYGEN SATURATION: 100 % | TEMPERATURE: 96.6 F | SYSTOLIC BLOOD PRESSURE: 138 MMHG | DIASTOLIC BLOOD PRESSURE: 82 MMHG

## 2025-04-23 DIAGNOSIS — R73.03 PREDIABETES: ICD-10-CM

## 2025-04-23 DIAGNOSIS — Z12.31 VISIT FOR SCREENING MAMMOGRAM: Primary | ICD-10-CM

## 2025-04-23 DIAGNOSIS — E66.01 SEVERE OBESITY (BMI 35.0-39.9) WITH COMORBIDITY (HCC): ICD-10-CM

## 2025-04-23 DIAGNOSIS — I10 ESSENTIAL HYPERTENSION: ICD-10-CM

## 2025-04-23 DIAGNOSIS — G44.209 TENSION HEADACHE: ICD-10-CM

## 2025-04-23 DIAGNOSIS — K58.1 IRRITABLE BOWEL SYNDROME WITH CONSTIPATION: ICD-10-CM

## 2025-04-23 DIAGNOSIS — Z12.31 BREAST CANCER SCREENING BY MAMMOGRAM: ICD-10-CM

## 2025-04-23 DIAGNOSIS — I10 ESSENTIAL HYPERTENSION: Primary | ICD-10-CM

## 2025-04-23 PROCEDURE — 3079F DIAST BP 80-89 MM HG: CPT | Performed by: FAMILY MEDICINE

## 2025-04-23 PROCEDURE — 3075F SYST BP GE 130 - 139MM HG: CPT | Performed by: FAMILY MEDICINE

## 2025-04-23 PROCEDURE — 99214 OFFICE O/P EST MOD 30 MIN: CPT | Performed by: FAMILY MEDICINE

## 2025-04-23 RX ORDER — AMLODIPINE BESYLATE 10 MG/1
10 TABLET ORAL DAILY
Qty: 90 TABLET | Refills: 3 | Status: SHIPPED | OUTPATIENT
Start: 2025-04-23

## 2025-04-23 RX ORDER — LOSARTAN POTASSIUM 50 MG/1
50 TABLET ORAL DAILY
Qty: 90 TABLET | Refills: 1 | Status: SHIPPED | OUTPATIENT
Start: 2025-04-23

## 2025-04-23 NOTE — PROGRESS NOTES
Elio Jarrett, 44 y.o.,  female    SUBJECTIVE  Ff-up     HTN-taking norvasc and losartan.  She continues to smoke. Denies depression symptoms     Prediabetes- reviewed labs A1c 5.7    Tension headache- says felt bad on cymbalta. Says prn fiorecet about one every few months effective.     IBS-C - about the same, linzess has been most effective however too costly. Refractory to miralax    Routine gyne- dr. Noel calvin [facility]    ROS:  See HPI, all others negative        Current Outpatient Medications:     Probiotic Product (PROBIOTIC ADVANCED PO), Take by mouth, Disp: , Rfl:     DIGESTIVE ENZYMES PO, Take by mouth, Disp: , Rfl:     amLODIPine (NORVASC) 10 MG tablet, Take 1 tablet by mouth daily, Disp: 90 tablet, Rfl: 3    losartan (COZAAR) 50 MG tablet, Take 1 tablet by mouth daily, Disp: 90 tablet, Rfl: 1    diclofenac sodium (VOLTAREN) 1 % GEL, Apply 4 g topically 4 times daily, Disp: 350 g, Rfl: 5    lidocaine (LIDODERM) 5 %, Place 1 patch onto the skin daily for 10 days 12 hours on, 12 hours off., Disp: 10 patch, Rfl: 0    meloxicam (MOBIC) 15 MG tablet, Take 1 tablet by mouth daily, Disp: 30 tablet, Rfl: 0    butalbital-acetaminophen-caffeine (FIORICET, ESGIC) -40 MG per tablet, Take 1 tablet by mouth every 4 hours as needed for Headaches, Disp: 30 tablet, Rfl: 0    metroNIDAZOLE (FLAGYL) 500 MG tablet, , Disp: , Rfl:     triamcinolone (KENALOG) 0.025 % ointment, APPLY TO AFFECTED AREA TWICE A DAY, Disp: 30 g, Rfl: 1      Allergies   Allergen Reactions    Carrot Swelling     Mouth and gum swelling      Pcn [Penicillins] Other (comments)     Patient states not allergic but gets candidiasis from it.     Strawberry Hives and Swelling       Past Medical History:   Diagnosis Date    Anxiety and depression     Bruises easily     Cervical high risk human papillomavirus (HPV) DNA test positive 03/2017    rpt pap smear 3/2018    Chronic constipation     dr. quirino moctezuma    Contact dermatitis and other

## 2025-06-02 ENCOUNTER — OFFICE VISIT (OUTPATIENT)
Age: 45
End: 2025-06-02
Payer: COMMERCIAL

## 2025-06-02 VITALS — HEIGHT: 65 IN | WEIGHT: 228 LBS | BODY MASS INDEX: 37.99 KG/M2

## 2025-06-02 DIAGNOSIS — M70.61 TROCHANTERIC BURSITIS OF RIGHT HIP: Primary | ICD-10-CM

## 2025-06-02 PROCEDURE — 99213 OFFICE O/P EST LOW 20 MIN: CPT

## 2025-06-02 NOTE — PROGRESS NOTES
Patient: Elio Jarrett                MRN: [id number]       SSN: [id number]  YOB: 1980        AGE: 45 y.o.        SEX: female  BMI: Body mass index is 37.94 kg/m².    PCP: Ann Munroe MD  06/02/25    Chief Complaint: Follow-up (Right hip follow up )      1. Trochanteric bursitis of right hip        ----------------------------------------------------------------------------------------------------------------  HPI:  Elio Jarrett is a 45 y.o. female with chief complaint of   Chief Complaint   Patient presents with    Follow-up     Right hip follow up        Presents with right hip pain, chronic.  Reports having tenderness over lateral aspect of her hip that radiates down to her knee.  Referred by her primary care who also started her on Mobic until she is able to get in with us.  X-rays were done at the hospital which showed mild osteoarthritic changes.  She reports that the pain has been present for about 26 years since she was pregnant.  She initially went to the chiropractor for pain which did not provide her with much relief.  She has pain that wakes her up from her sleep when she goes to lie onto her right side causing her to reposition but not being able to find a comfortable way to sleep.  She also has increased pain with physical activity including walking.  She thought that at first it was her back but now is realizing that it is likely her hip.    On Cymbalta--mood regulation and headache management    Past Medical History:   Hypertension  IBS  Migraines with aura  Tobacco use--vapes  Obesity 36     Anticoagulation?   no     -----------------------------------------------------------------------------------------------------------------  IMAGING:  Imaging read by myself and interpreted as follows:    March 26, 2025:  2 view x-ray of the right hip taken at an outside facility including AP and lateral demonstrates mild joint space narrowing with osteophyte formation.

## 2025-06-05 ENCOUNTER — HOSPITAL ENCOUNTER (OUTPATIENT)
Facility: HOSPITAL | Age: 45
Discharge: HOME | End: 2025-06-08
Attending: FAMILY MEDICINE
Payer: COMMERCIAL

## 2025-06-05 VITALS — HEIGHT: 65 IN | WEIGHT: 228 LBS | BODY MASS INDEX: 37.99 KG/M2

## 2025-06-05 DIAGNOSIS — Z12.31 VISIT FOR SCREENING MAMMOGRAM: ICD-10-CM

## 2025-06-05 PROCEDURE — 77063 BREAST TOMOSYNTHESIS BI: CPT
